# Patient Record
Sex: MALE | Race: WHITE | NOT HISPANIC OR LATINO | Employment: OTHER | URBAN - METROPOLITAN AREA
[De-identification: names, ages, dates, MRNs, and addresses within clinical notes are randomized per-mention and may not be internally consistent; named-entity substitution may affect disease eponyms.]

---

## 2017-01-23 ENCOUNTER — NON-PROVIDER VISIT (OUTPATIENT)
Dept: NEUROLOGY | Facility: MEDICAL CENTER | Age: 57
End: 2017-01-23
Payer: COMMERCIAL

## 2017-01-23 DIAGNOSIS — G40.209 PARTIAL EPILEPSY WITH IMPAIRMENT OF CONSCIOUSNESS (HCC): ICD-10-CM

## 2017-01-23 PROCEDURE — 95951 PR EEG MONITORING/VIDEORECORD: CPT | Mod: 52 | Performed by: PSYCHIATRY & NEUROLOGY

## 2017-01-23 NOTE — MR AVS SNAPSHOT
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   [facility] [place] (--)    [address]  [address]  [place] [address] [phone]           You will be receiving a confirmation call a few days before your appointment from our automated call confirmation system.              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 Review all of your home medications and newly ordered medications with your provider and/or pharmacist. Follow medication  instructions as directed by your provider and/or pharmacist. Please keep your medication list with you and share with your provider. Update the information when medications are discontinued, doses are changed, or new medications (including over-the-counter products) are added; and carry medication information at all times in the event of emergency situations     Allergies:  No Known Allergies          Medications  Valid as of: January 23, 2017 -  8:54 AM    Generic Name Brand Name Tablet Size Instructions for use    Albuterol Sulfate (Aero Soln) albuterol 108 (90 BASE) MCG/ACT INHALE 2 PUFFS BY MOUTH EVERY 6 HOURS ASNEEDED FOR SHORTNESS OF BREATH        Beclomethasone Dipropionate (Aero Soln) QVAR 40 MCG/ACT Inhale 1 Puff by mouth 2 Times a Day.        CarBAMazepine (CAPSULE SR 12 HR) CARBATROL 300 MG Take 1 Cap by mouth 2 times a day.        LamoTRIgine (Tab) LAMICTAL 100 MG Take 1 Tab by mouth 2 times a day. Take with 200 mg to equal 300 mg        LamoTRIgine (Tab) LAMICTAL 200 MG TAKE 1 TABLET BY MOUTH TWICE DAILY-GENERIC FOR LAMICTAL        Pravastatin Sodium (Tab) PRAVACHOL 20 MG TAKE 1 TABLET BY MOUTH EVERY EVENING-GENERIC FOR PRAVACHOL        .                 Medicines prescribed today were sent to:     [id number] - [place], [place] - [address]    [address] [facility] [place] [address]    Phone: [phone] Fax: [phone]    Open 24 Hours?: No      Medication refill instructions:       If your prescription bottle indicates you have medication refills left, it is not necessary to call your provider’s office. Please contact your pharmacy and they will refill your medication.    If your prescription bottle indicates you do not have any refills left, you may request refills at any time through one of the following ways: The online [employer] system (except Urgent Care), by calling your provider’s office, or by asking your pharmacy to contact your provider’s office with a refill request.  Medication refills are processed only during regular business hours and may not be available until the next business day. Your provider may request additional information or to have a follow-up visit with you prior to refilling your medication.   *Please Note: Medication refills are assigned a new Rx number when refilled electronically. Your pharmacy may indicate that no refills were authorized even though a new prescription for the same medication is available at the pharmacy. Please request the medicine by name with the pharmacy before contacting your provider for a refill.           InMyShow Access Code: Activation code not generated  Current InMyShow Status: Active

## 2017-01-25 NOTE — PROCEDURES
DATE OF SERVICE:  01/23/2017    ROUTINE VIDEO ELECTROENCEPHALOGRAM REPORT      Referring MD: Dr. Brent Rutledge.     DOS:  1/23/2017    INDICATION:  Fredy Zavala 56 y.o. male presenting with history of focal seizures and recurrent spells of dizziness and possibly changes in awareness.     CURRENT ANTIEPILEPTIC REGIMEN: Lamotrigine and carbamazepine     TECHNIQUE: 30 channel routine electroencephalogram (EEG) was performed in accordance with the international 10-20 system. The study was reviewed in bipolar and referential montages. The recording examined the patient during wakeful and drowsy state(s).     DESCRIPTION OF THE RECORD:  During the wakefulness, the background showed a symmetrical 8-9 Hz alpha activity posteriorly with amplitude of 70 mV.  There was reactivity to eye closure/opening.  A normal anterior-posterior gradient was noted with faster beta frequencies seen anteriorly.  During drowsiness, theta frequencies were seen.    ACTIVATION PROCEDURES:    Hyperventilation was performed by the patient for a total of 3 minutes. The technician performing the test noted good effort. This technique produced electroencephalographic changes in keeping with the expected bilaterally synchronous, frontally predominant, high amplitude slow waves build up.     Intermittent Photic stimulation was performed in a stepwise fashion from 1 to 30 Hz. There was not photic driving at higher frequencies.     ICTAL AND/OR INTERICTAL FINDINGS:    No focal or generalized epileptiform activity noted. No regional slowing was seen during this routine study.  No clinical events or seizures were reported or recorded during the study.      EKG: sampling of the EKG recording demonstrated sinus rhythm.      INTERPRETATION:  This is a normal video EEG recording in the awake and drowsy state(s).  Clinical correlation is recommended.    Note: A normal EEG does not rule out epilepsy.  If the clinical suspicion remains high for seizures,  a prolonged recording to capture clinical or subclinical events may be helpful.      Cesario Negron MD  Diplomate in Neurology, Epilepsy, and Electrodiagnostic Medicine.    RANDI CUELLAR    DD:  01/24/2017 18:42:54  DT:  01/24/2017 20:07:00    D#:  [id number]  Job#:  [id number]    cc: RUIZ SMITH MD

## 2017-01-25 NOTE — PROGRESS NOTES
ROUTINE VIDEO ELECTROENCEPHALOGRAM REPORT      Referring MD: Dr. Brent Rutledge.     DOS:  1/23/2017    INDICATION:  Fredy Zavala 56 y.o. male presenting with history of focal seizures and recurrent spells of dizziness and possibly changes in awareness.     CURRENT ANTIEPILEPTIC REGIMEN: Lamotrigine and carbamazepine     TECHNIQUE: 30 channel routine electroencephalogram (EEG) was performed in accordance with the international 10-20 system. The study was reviewed in bipolar and referential montages. The recording examined the patient during wakeful and drowsy state(s).     DESCRIPTION OF THE RECORD:  During the wakefulness, the background showed a symmetrical 8-9 Hz alpha activity posteriorly with amplitude of 70 mV.  There was reactivity to eye closure/opening.  A normal anterior-posterior gradient was noted with faster beta frequencies seen anteriorly.  During drowsiness, theta frequencies were seen.    ACTIVATION PROCEDURES:   Hyperventilation was performed by the patient for a total of 3 minutes. The technician performing the test noted good effort. This technique produced electroencephalographic changes in keeping with the expected bilaterally synchronous, frontally predominant, high amplitude slow waves build up.     Intermittent Photic stimulation was performed in a stepwise fashion from 1 to 30 Hz. There was not photic driving at higher frequencies.     ICTAL AND/OR INTERICTAL FINDINGS:   No focal or generalized epileptiform activity noted. No regional slowing was seen during this routine study.  No clinical events or seizures were reported or recorded during the study.     EKG: sampling of the EKG recording demonstrated sinus rhythm.      INTERPRETATION:  This is a normal video EEG recording in the awake and drowsy state(s).  Clinical correlation is recommended.    Note: A normal EEG does not rule out epilepsy.  If the clinical suspicion remains high for seizures, a prolonged recording to capture  clinical or subclinical events may be helpful.      Cesario Negron MD  Diplomate in Neurology, Epilepsy, and Electrodiagnostic Medicine.

## 2017-02-06 ENCOUNTER — PATIENT MESSAGE (OUTPATIENT)
Dept: NEUROLOGY | Facility: MEDICAL CENTER | Age: 57
End: 2017-02-06

## 2017-02-06 ENCOUNTER — TELEPHONE (OUTPATIENT)
Dept: NEUROLOGY | Facility: MEDICAL CENTER | Age: 57
End: 2017-02-06

## 2017-02-06 NOTE — TELEPHONE ENCOUNTER
"From: Fredy Zavala  To: Brent Rutledge M.D.  Sent: 2/6/2017 2:19 PM PST  Subject: Test Result Question    Good Afternoon Dr. Galvan,      I contacted your office today and spoke with the medical assistant. I was calling regarding my EEG results as the information in the my chart only says the results are \"no component information available for this result\". The medical assistant indicated that my EEG results are slightly below normal but had no other information for me. What is the next step for me is? What do these results mean? The MA questioned if I had seen the doctor that read results?  "

## 2017-02-06 NOTE — TELEPHONE ENCOUNTER
Pt is calling and asking for the results of his EEG. Results given. Pt is going to make a follow up appointment with Dr. Rutledge. KA

## 2017-05-03 ENCOUNTER — TELEPHONE (OUTPATIENT)
Dept: MEDICAL GROUP | Facility: PHYSICIAN GROUP | Age: 57
End: 2017-05-03

## 2017-05-03 NOTE — TELEPHONE ENCOUNTER
ESTABLISHED PATIENT PRE-VISIT PLANNING     Note: Patient will not be contacted if there is no indication to call.     1.  Reviewed note from last office visit with PCP and/or other med group provider: Yes    2.  If any orders were placed at last visit, do we have Results/Consult Notes?        •  Labs - Labs ordered, completed and results are in chart.       •  Imaging - Imaging was not ordered at last office visit.       •  Referrals - No referrals were ordered at last office visit.    3.  Immunizations were updated in Epic using WebIZ?: Epic matches WebIZ       •  Web Iz Recommendations: HEPATITIS A  HEPATITIS B MMR  TD VARICELLA (Chicken Pox)     4.  Patient is due for the following Health Maintenance Topics:   Health Maintenance Due   Topic Date Due   • IMM PNEUMOCOCCAL 19-64 (ADULT) MEDIUM RISK SERIES (1 of 1 - PPSV23) 05/24/1978   • PFT SCREENING-FEV1 AND FEV/FVC RATIO / SPIROMETRY SHOULD BE PERFORMED ANNUALLY  02/06/2010       - Patient has completed FLU and TDAP Immunization(s) per WebIZ. Chart has been updated.    5.  Patient was not informed to arrive 15 min prior to their scheduled appointment and bring in their medication bottles.

## 2017-05-04 ENCOUNTER — APPOINTMENT (OUTPATIENT)
Dept: MEDICAL GROUP | Facility: PHYSICIAN GROUP | Age: 57
End: 2017-05-04
Payer: COMMERCIAL

## 2017-05-10 ENCOUNTER — PATIENT OUTREACH (OUTPATIENT)
Dept: HEALTH INFORMATION MANAGEMENT | Facility: OTHER | Age: 57
End: 2017-05-10

## 2017-05-10 NOTE — PROGRESS NOTES
Outcome: Requested Call Back    WebIZ Checked & Epic Updated:  no    [facility]Connect Verified: yes    Attempt # 1

## 2017-05-19 ENCOUNTER — PATIENT MESSAGE (OUTPATIENT)
Dept: HEALTH INFORMATION MANAGEMENT | Facility: OTHER | Age: 57
End: 2017-05-19

## 2017-05-19 NOTE — PROGRESS NOTES
Attempt #:2    WebIZ Checked & Epic Updated: yes  HealthConnect Verified: yes  Verify PCP: yes    Communication Preference Obtained: yes     Review Care Team: yes    Annual Wellness Visit Scheduling - Patient will call back when ready to schedule  1. Scheduling Status:Not Scheduled. Patient states they are not interested        Care Gap Scheduling (Attempt to Schedule EACH Overdue Care Gap!)- Unable to discuss     Health Maintenance Due   Topic Date Due   • IMM PNEUMOCOCCAL 19-64 (ADULT) MEDIUM RISK SERIES (1 of 1 - PPSV23) 05/24/1978   • PFT SCREENING-FEV1 AND FEV/FVC RATIO / SPIROMETRY SHOULD BE PERFORMED ANNUALLY  02/06/2010         Audiolife Activation: already active  Audiolife Julissa: no  Virtual Visits: no  Opt In to Text Messages: no

## 2017-09-11 DIAGNOSIS — E78.5 DYSLIPIDEMIA: ICD-10-CM

## 2017-09-11 DIAGNOSIS — R56.9 SEIZURE (HCC): ICD-10-CM

## 2017-09-11 RX ORDER — LAMOTRIGINE 100 MG/1
100 TABLET ORAL 2 TIMES DAILY
Qty: 180 TAB | Refills: 0 | Status: SHIPPED | OUTPATIENT
Start: 2017-09-11 | End: 2017-12-15 | Stop reason: SDUPTHER

## 2017-09-11 RX ORDER — PRAVASTATIN SODIUM 20 MG
TABLET ORAL
Qty: 90 TAB | Refills: 0 | Status: SHIPPED | OUTPATIENT
Start: 2017-09-11 | End: 2017-12-11 | Stop reason: SDUPTHER

## 2017-12-11 DIAGNOSIS — E78.5 DYSLIPIDEMIA: ICD-10-CM

## 2017-12-11 DIAGNOSIS — R56.9 SEIZURE (HCC): ICD-10-CM

## 2017-12-11 RX ORDER — LAMOTRIGINE 200 MG/1
TABLET ORAL
Qty: 60 TAB | Refills: 0 | Status: SHIPPED | OUTPATIENT
Start: 2017-12-11 | End: 2017-12-15 | Stop reason: SDUPTHER

## 2017-12-11 RX ORDER — PRAVASTATIN SODIUM 20 MG
TABLET ORAL
Qty: 30 TAB | Refills: 0 | Status: SHIPPED | OUTPATIENT
Start: 2017-12-11 | End: 2017-12-15 | Stop reason: SDUPTHER

## 2017-12-11 RX ORDER — CARBAMAZEPINE 300 MG/1
300 CAPSULE, EXTENDED RELEASE ORAL 2 TIMES DAILY
Qty: 60 CAP | Refills: 0 | Status: SHIPPED | OUTPATIENT
Start: 2017-12-11 | End: 2017-12-15 | Stop reason: SDUPTHER

## 2017-12-12 NOTE — TELEPHONE ENCOUNTER
Patient needs appointment for more refills. Only 30 day supply given. Has not been seen in over a year.

## 2017-12-15 DIAGNOSIS — E78.5 DYSLIPIDEMIA: ICD-10-CM

## 2017-12-15 DIAGNOSIS — R56.9 SEIZURE (HCC): ICD-10-CM

## 2017-12-15 NOTE — TELEPHONE ENCOUNTER
From: Fredy Zavala  Sent: 12/15/2017 12:02 PM PST  Subject: Medication Renewal Request    Fredy Zavala would like a refill of the following medications:     lamotrigine (LAMICTAL) 100 MG Tab [Beatrice Smith M.D.]     lamotrigine (LAMICTAL) 200 MG tablet [Beatrice Smith M.D.]     pravastatin (PRAVACHOL) 20 MG Tab [Beatrice Smith M.D.]     carbamazepine (CARBATROL) 300 MG CR capsule [Beatrice Smith M.D.]    Preferred pharmacy: [id number] - [place], [place] - [address] [facility].    

## 2017-12-17 RX ORDER — LAMOTRIGINE 100 MG/1
100 TABLET ORAL 2 TIMES DAILY
Qty: 60 TAB | Refills: 0 | Status: SHIPPED | OUTPATIENT
Start: 2017-12-17 | End: 2017-12-21 | Stop reason: SDUPTHER

## 2017-12-17 RX ORDER — PRAVASTATIN SODIUM 20 MG
TABLET ORAL
Qty: 30 TAB | Refills: 0 | Status: SHIPPED | OUTPATIENT
Start: 2017-12-17 | End: 2018-02-12 | Stop reason: SDUPTHER

## 2017-12-17 RX ORDER — LAMOTRIGINE 200 MG/1
TABLET ORAL
Qty: 60 TAB | Refills: 0 | Status: SHIPPED | OUTPATIENT
Start: 2017-12-17 | End: 2017-12-21 | Stop reason: SDUPTHER

## 2017-12-17 RX ORDER — CARBAMAZEPINE 300 MG/1
300 CAPSULE, EXTENDED RELEASE ORAL 2 TIMES DAILY
Qty: 60 CAP | Refills: 0 | Status: SHIPPED | OUTPATIENT
Start: 2017-12-17 | End: 2017-12-21 | Stop reason: SDUPTHER

## 2017-12-21 ENCOUNTER — OFFICE VISIT (OUTPATIENT)
Dept: NEUROLOGY | Facility: MEDICAL CENTER | Age: 57
End: 2017-12-21
Payer: COMMERCIAL

## 2017-12-21 VITALS
RESPIRATION RATE: 15 BRPM | TEMPERATURE: 97.5 F | HEART RATE: 76 BPM | WEIGHT: 188.6 LBS | BODY MASS INDEX: 28.58 KG/M2 | HEIGHT: 68 IN | SYSTOLIC BLOOD PRESSURE: 140 MMHG | DIASTOLIC BLOOD PRESSURE: 80 MMHG | OXYGEN SATURATION: 96 %

## 2017-12-21 DIAGNOSIS — G40.209 PARTIAL EPILEPSY WITH IMPAIRMENT OF CONSCIOUSNESS (HCC): Primary | ICD-10-CM

## 2017-12-21 PROCEDURE — 99214 OFFICE O/P EST MOD 30 MIN: CPT | Performed by: PSYCHIATRY & NEUROLOGY

## 2017-12-21 RX ORDER — LAMOTRIGINE 200 MG/1
TABLET ORAL
Qty: 60 TAB | Refills: 11 | Status: SHIPPED | OUTPATIENT
Start: 2017-12-21 | End: 2019-01-31 | Stop reason: SDUPTHER

## 2017-12-21 RX ORDER — CARBAMAZEPINE 300 MG/1
300 CAPSULE, EXTENDED RELEASE ORAL 2 TIMES DAILY
Qty: 60 CAP | Refills: 11 | Status: SHIPPED | OUTPATIENT
Start: 2017-12-21 | End: 2017-12-21 | Stop reason: SDUPTHER

## 2017-12-21 RX ORDER — LAMOTRIGINE 200 MG/1
TABLET ORAL
Qty: 60 TAB | Refills: 11 | Status: SHIPPED | OUTPATIENT
Start: 2017-12-21 | End: 2017-12-21 | Stop reason: SDUPTHER

## 2017-12-21 RX ORDER — LAMOTRIGINE 100 MG/1
100 TABLET ORAL 2 TIMES DAILY
Qty: 60 TAB | Refills: 11 | Status: SHIPPED | OUTPATIENT
Start: 2017-12-21 | End: 2017-12-21 | Stop reason: SDUPTHER

## 2017-12-21 RX ORDER — CARBAMAZEPINE 300 MG/1
300 CAPSULE, EXTENDED RELEASE ORAL 2 TIMES DAILY
Qty: 60 CAP | Refills: 11 | Status: SHIPPED | OUTPATIENT
Start: 2017-12-21 | End: 2018-02-12 | Stop reason: SDUPTHER

## 2017-12-21 RX ORDER — LAMOTRIGINE 100 MG/1
100 TABLET ORAL 2 TIMES DAILY
Qty: 60 TAB | Refills: 11 | Status: SHIPPED | OUTPATIENT
Start: 2017-12-21 | End: 2019-01-31 | Stop reason: SDUPTHER

## 2017-12-21 ASSESSMENT — ENCOUNTER SYMPTOMS
MEMORY LOSS: 0
SEIZURES: 0
DEPRESSION: 0
LOSS OF CONSCIOUSNESS: 0

## 2017-12-21 ASSESSMENT — PAIN SCALES - GENERAL: PAINLEVEL: NO PAIN

## 2017-12-21 ASSESSMENT — PATIENT HEALTH QUESTIONNAIRE - PHQ9: CLINICAL INTERPRETATION OF PHQ2 SCORE: 0

## 2018-01-04 ENCOUNTER — OFFICE VISIT (OUTPATIENT)
Dept: MEDICAL GROUP | Facility: PHYSICIAN GROUP | Age: 58
End: 2018-01-04
Payer: COMMERCIAL

## 2018-01-04 VITALS
HEART RATE: 71 BPM | SYSTOLIC BLOOD PRESSURE: 140 MMHG | BODY MASS INDEX: 28.49 KG/M2 | OXYGEN SATURATION: 97 % | HEIGHT: 68 IN | WEIGHT: 188 LBS | DIASTOLIC BLOOD PRESSURE: 80 MMHG | RESPIRATION RATE: 16 BRPM | TEMPERATURE: 97.5 F

## 2018-01-04 DIAGNOSIS — Z85.46 HISTORY OF PROSTATE CANCER: ICD-10-CM

## 2018-01-04 DIAGNOSIS — G40.209 PARTIAL EPILEPSY WITH IMPAIRMENT OF CONSCIOUSNESS (HCC): ICD-10-CM

## 2018-01-04 DIAGNOSIS — L29.9 ITCHY SKIN: ICD-10-CM

## 2018-01-04 DIAGNOSIS — E78.5 DYSLIPIDEMIA: ICD-10-CM

## 2018-01-04 PROCEDURE — 99214 OFFICE O/P EST MOD 30 MIN: CPT | Performed by: FAMILY MEDICINE

## 2018-01-04 NOTE — ASSESSMENT & PLAN NOTE
Ongoing issue. Patient was diagnosed with prostate cancer 5 years ago. Currently follows with urology for this issue. He did have a prostatectomy for this problem; he is currently denying any new issues to be addressed today

## 2018-01-04 NOTE — ASSESSMENT & PLAN NOTE
New problem. Patient reports that he started having some itchy scan approximately 3 weeks ago. He has had it in the areas of his back, hands, lower legs. He states it is itchy in nature; does not have any pain; there are no pustules associated with this or redness. He has tried some lotion but does not recall what the name of it is.

## 2018-01-04 NOTE — PROGRESS NOTES
Subjective:   Fredy Zavala is a 57 y.o. male here today for Seizures, itchy skin, history of prostate cancer    Partial epilepsy with impairment of consciousness  Ongoing issue. Patient reports compliance with current medication and follows up with neurology for this issue. He reports that he continues to have seizures on a regular basis. He currently does not drive.    Itchy skin  New problem. Patient reports that he started having some itchy scan approximately 3 weeks ago. He has had it in the areas of his back, hands, lower legs. He states it is itchy in nature; does not have any pain; there are no pustules associated with this or redness. He has tried some lotion but does not recall what the name of it is.    History of prostate cancer  Ongoing issue. Patient was diagnosed with prostate cancer 5 years ago. Currently follows with urology for this issue. He did have a prostatectomy for this problem; he is currently denying any new issues to be addressed today    Dyslipidemia  Ongoing issue. Patient reports compliance with pravastatin 20 mg daily; he denies any new issues with muscle cramps or weakness. Chart review shows that his previous total cholesterol and LDL cholesterol were mildly elevated. He reports that he does try to eat healthy but does not get any regular exercise.     Patient is here today to establish care    Current medicines (including changes today)  Current Outpatient Prescriptions   Medication Sig Dispense Refill   • lamotrigine (LAMICTAL) 200 MG tablet TAKE 1 TABLET BY MOUTH TWICE DAILY-GENERIC FOR LAMICTAL 60 Tab 11   • lamotrigine (LAMICTAL) 100 MG Tab Take 1 Tab by mouth 2 times a day. Take with 200 mg to equal 300 mg 60 Tab 11   • carbamazepine (CARBATROL) 300 MG CR capsule Take 1 Cap by mouth 2 times a day. 60 Cap 11   • pravastatin (PRAVACHOL) 20 MG Tab TAKE 1 TABLET BY MOUTH EVERY EVENING-GENERIC FOR PRAVACHOL 30 Tab 0   • albuterol (PROAIR HFA) 108 (90 BASE) MCG/ACT Aero Soln  "inhalation aerosol INHALE 2 PUFFS BY MOUTH EVERY 6 HOURS ASNEEDED FOR SHORTNESS OF BREATH 8.5 g 5   • beclomethasone (QVAR) 40 MCG/ACT inhaler Inhale 1 Puff by mouth 2 Times a Day. 3 Inhaler 3     No current facility-administered medications for this visit.      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    ROS   No chest pain, no shortness of breath, no abdominal pain  +Itchy skin     Objective:     Blood pressure 140/80, pulse 71, temperature 36.4 °C (97.5 °F), resp. rate 16, height 1.727 m (5' 8\"), weight 85.3 kg (188 lb), SpO2 97 %. Body mass index is 28.59 kg/m².   Physical Exam:  Alert, oriented in no acute distress.  Eye contact is good, speech goal directed, affect calm  HEENT: conjunctiva non-injected, sclera non-icteric.  Pinna normal. TM pearly gray.   Oral mucous membranes pink and moist with no lesions.  Neck No adenopathy or masses in the neck or supraclavicular regions.  Lungs: clear to auscultation bilaterally with good excursion.  CV: regular rate and rhythm.  Abdomen: soft, nontender, No CVAT  Ext: no edema, color normal, vascularity normal, temperature normal  Skin: Multiple patches of dry skin on the back, upper extremities and lower extremities. No pustules, mild erythema, no swelling. No tenderness to palpation, no blanching      Assessment and Plan:   The following treatment plan was discussed     1. Itchy skin      Uncontrolled; unknown origin. This appears to be dry skin; recommend over-the-counter cream to help with the management. Monitor   2. 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 Continue medications; continue follow with neurology and their recommendations   3. History of prostate cancer      Stable. Continue follow-up with urology   4. Dyslipidemia  COMP METABOLIC PANEL    LIPID PROFILE    " VITAMIN D,25 HYDROXY    Stable. Continue current medications; sent for labs and monitor for results       Followup: Return in about 1 year (around 1/4/2019) for medication review, Short.

## 2018-01-04 NOTE — ASSESSMENT & PLAN NOTE
Ongoing issue. Patient reports compliance with current medication and follows up with neurology for this issue. He reports that he continues to have seizures on a regular basis. He currently does not drive.

## 2018-01-04 NOTE — ASSESSMENT & PLAN NOTE
Ongoing issue. Patient reports compliance with pravastatin 20 mg daily; he denies any new issues with muscle cramps or weakness. Chart review shows that his previous total cholesterol and LDL cholesterol were mildly elevated. He reports that he does try to eat healthy but does not get any regular exercise.

## 2018-01-18 DIAGNOSIS — J44.9 CHRONIC OBSTRUCTIVE PULMONARY DISEASE, UNSPECIFIED COPD TYPE (HCC): ICD-10-CM

## 2018-01-18 RX ORDER — ALBUTEROL SULFATE 90 UG/1
AEROSOL, METERED RESPIRATORY (INHALATION)
Qty: 8.5 G | Refills: 5 | Status: SHIPPED | OUTPATIENT
Start: 2018-01-18 | End: 2021-09-09 | Stop reason: SDUPTHER

## 2018-02-12 DIAGNOSIS — G40.209 PARTIAL EPILEPSY WITH IMPAIRMENT OF CONSCIOUSNESS (HCC): ICD-10-CM

## 2018-02-12 DIAGNOSIS — E78.5 DYSLIPIDEMIA: ICD-10-CM

## 2018-02-13 RX ORDER — PRAVASTATIN SODIUM 20 MG
TABLET ORAL
Qty: 90 TAB | Refills: 3 | Status: SHIPPED | OUTPATIENT
Start: 2018-02-13 | End: 2019-01-31 | Stop reason: SDUPTHER

## 2018-02-13 RX ORDER — CARBAMAZEPINE 300 MG/1
300 CAPSULE, EXTENDED RELEASE ORAL 2 TIMES DAILY
Qty: 180 CAP | Refills: 3 | Status: SHIPPED | OUTPATIENT
Start: 2018-02-13 | End: 2019-02-05 | Stop reason: SDUPTHER

## 2018-03-26 ENCOUNTER — HOSPITAL ENCOUNTER (OUTPATIENT)
Dept: LAB | Facility: MEDICAL CENTER | Age: 58
End: 2018-03-26
Attending: UROLOGY
Payer: COMMERCIAL

## 2018-03-26 ENCOUNTER — HOSPITAL ENCOUNTER (OUTPATIENT)
Dept: LAB | Facility: MEDICAL CENTER | Age: 58
End: 2018-03-26
Attending: FAMILY MEDICINE
Payer: COMMERCIAL

## 2018-03-26 DIAGNOSIS — E78.5 DYSLIPIDEMIA: ICD-10-CM

## 2018-03-26 LAB
25(OH)D3 SERPL-MCNC: 15 NG/ML (ref 30–100)
ALBUMIN SERPL BCP-MCNC: 4.7 G/DL (ref 3.2–4.9)
ALBUMIN/GLOB SERPL: 1.7 G/DL
ALP SERPL-CCNC: 71 U/L (ref 30–99)
ALT SERPL-CCNC: 19 U/L (ref 2–50)
ANION GAP SERPL CALC-SCNC: 9 MMOL/L (ref 0–11.9)
AST SERPL-CCNC: 18 U/L (ref 12–45)
BILIRUB SERPL-MCNC: 0.7 MG/DL (ref 0.1–1.5)
BUN SERPL-MCNC: 14 MG/DL (ref 8–22)
CALCIUM SERPL-MCNC: 10.2 MG/DL (ref 8.5–10.5)
CHLORIDE SERPL-SCNC: 103 MMOL/L (ref 96–112)
CHOLEST SERPL-MCNC: 273 MG/DL (ref 100–199)
CO2 SERPL-SCNC: 27 MMOL/L (ref 20–33)
CREAT SERPL-MCNC: 0.97 MG/DL (ref 0.5–1.4)
GLOBULIN SER CALC-MCNC: 2.7 G/DL (ref 1.9–3.5)
GLUCOSE SERPL-MCNC: 80 MG/DL (ref 65–99)
HDLC SERPL-MCNC: 44 MG/DL
LDLC SERPL CALC-MCNC: 211 MG/DL
POTASSIUM SERPL-SCNC: 4.1 MMOL/L (ref 3.6–5.5)
PROT SERPL-MCNC: 7.4 G/DL (ref 6–8.2)
PSA SERPL-MCNC: 0.01 NG/ML (ref 0–4)
SODIUM SERPL-SCNC: 139 MMOL/L (ref 135–145)
TRIGL SERPL-MCNC: 90 MG/DL (ref 0–149)

## 2018-03-26 PROCEDURE — 36415 COLL VENOUS BLD VENIPUNCTURE: CPT

## 2018-03-26 PROCEDURE — 82306 VITAMIN D 25 HYDROXY: CPT

## 2018-03-26 PROCEDURE — 84153 ASSAY OF PSA TOTAL: CPT

## 2018-03-26 PROCEDURE — 80061 LIPID PANEL: CPT

## 2018-03-26 PROCEDURE — 80053 COMPREHEN METABOLIC PANEL: CPT

## 2019-01-31 ENCOUNTER — OFFICE VISIT (OUTPATIENT)
Dept: URGENT CARE | Facility: PHYSICIAN GROUP | Age: 59
End: 2019-01-31
Payer: COMMERCIAL

## 2019-01-31 VITALS
RESPIRATION RATE: 16 BRPM | SYSTOLIC BLOOD PRESSURE: 112 MMHG | TEMPERATURE: 98.1 F | OXYGEN SATURATION: 98 % | DIASTOLIC BLOOD PRESSURE: 62 MMHG | HEART RATE: 82 BPM | HEIGHT: 68 IN | WEIGHT: 188 LBS | BODY MASS INDEX: 28.49 KG/M2

## 2019-01-31 DIAGNOSIS — E78.5 DYSLIPIDEMIA: ICD-10-CM

## 2019-01-31 DIAGNOSIS — Z76.0 ENCOUNTER FOR MEDICATION REFILL: ICD-10-CM

## 2019-01-31 DIAGNOSIS — G40.209 PARTIAL EPILEPSY WITH IMPAIRMENT OF CONSCIOUSNESS (HCC): ICD-10-CM

## 2019-01-31 PROCEDURE — 99213 OFFICE O/P EST LOW 20 MIN: CPT | Performed by: NURSE PRACTITIONER

## 2019-01-31 RX ORDER — PRAVASTATIN SODIUM 20 MG
TABLET ORAL
Qty: 30 TAB | Refills: 0 | Status: SHIPPED | OUTPATIENT
Start: 2019-01-31 | End: 2020-06-16 | Stop reason: SDUPTHER

## 2019-01-31 RX ORDER — LAMOTRIGINE 100 MG/1
100 TABLET ORAL 2 TIMES DAILY
Qty: 60 TAB | Refills: 0 | Status: SHIPPED | OUTPATIENT
Start: 2019-01-31 | End: 2019-02-05 | Stop reason: SDUPTHER

## 2019-01-31 RX ORDER — LAMOTRIGINE 200 MG/1
TABLET ORAL
Qty: 60 TAB | Refills: 0 | Status: SHIPPED | OUTPATIENT
Start: 2019-01-31 | End: 2019-02-05 | Stop reason: SDUPTHER

## 2019-01-31 ASSESSMENT — ENCOUNTER SYMPTOMS: SEIZURES: 1

## 2019-02-01 NOTE — PROGRESS NOTES
"Subjective:      Fredy Zavala is a 58 y.o. male who presents with Seizure (med refill Lamictal// prarvastatin )            This is a new problem. Pt reports this evening he is in need of medication refills for his epilepsy and cholesterol medication. He states his old PCP moved away and his new PCP appt is in one month. He tried contacting the office for refills but was unsuccessful. He has been well controlled with both issues for several years and there have not been any new seizures recently. Denies any other needs at this time.        Review of Systems   Neurological: Positive for seizures.        Hx epilepsy   All other systems reviewed and are negative.    Past Medical History:   Diagnosis Date   • ASTHMA    • Cancer (HCC) 5/12    prostate   • COPD (chronic obstructive pulmonary disease) (HCC)    • Dyslipidemia    • Fall     hasn't fallen but has seizure disorder   • Partial epilepsy with impairment of consciousness (HCC)      ICD-10 transition      Past Surgical History:   Procedure Laterality Date   • PROSTATECTOMY ROBOTIC  5/29/2012    Performed by JACOB RAMOS at SURGERY [facility]   • COLONOSCOPY  8/10    grade 1 int. hemorrhoids      Social History     Social History   • Marital status:      Spouse name: N/A   • Number of children: N/A   • Years of education: N/A     Occupational History   • Not on file.     Social History Main Topics   • Smoking status: Never Smoker   • Smokeless tobacco: Never Used   • Alcohol use 0.0 oz/week      Comment: 5 drinks/weekend   • Drug use: No      Comment: previous   • Sexual activity: Yes     Partners: Female     Other Topics Concern   • Not on file     Social History Narrative   • No narrative on file          Objective:     /62   Pulse 82   Temp 36.7 °C (98.1 °F) (Temporal)   Resp 16   Ht 1.727 m (5' 8\")   Wt 85.3 kg (188 lb)   SpO2 98%   BMI 28.59 kg/m²      Physical Exam   Constitutional: He is oriented to person, place, and time. " Vital signs are normal. He appears well-developed and well-nourished.   HENT:   Head: Normocephalic and atraumatic.   Eyes: Pupils are equal, round, and reactive to light. EOM are normal.   Neck: Normal range of motion.   Cardiovascular: Normal rate and regular rhythm.    Pulmonary/Chest: Effort normal.   Musculoskeletal: Normal range of motion.   Neurological: He is alert and oriented to person, place, and time. He has normal strength. No cranial nerve deficit or sensory deficit.   Skin: Skin is warm and dry. Capillary refill takes less than 2 seconds.   Psychiatric: He has a normal mood and affect. His speech is normal and behavior is normal. Thought content normal.   Vitals reviewed.              Assessment/Plan:     1. Encounter for medication refill    2. Partial epilepsy with impairment of consciousness (HCC)  - lamoTRIgine (LAMICTAL) 100 MG Tab; Take 1 Tab by mouth 2 times a day. Take with 200 mg to equal 300 mg  Dispense: 60 Tab; Refill: 0  - lamotrigine (LAMICTAL) 200 MG tablet; TAKE 1 TABLET BY MOUTH TWICE DAILY-GENERIC FOR LAMICTAL  Dispense: 60 Tab; Refill: 0    3. Dyslipidemia  - pravastatin (PRAVACHOL) 20 MG Tab; TAKE 1 TABLET BY MOUTH EVERY EVENING-GENERIC FOR PRAVACHOL  Dispense: 30 Tab; Refill: 0    Happy to refill medications he is in need of  Follow up with PCP as previously scheduled  Pt declines any further needs this evening  Instructed to return to  or nearest emergency department if symptoms fail to improve, for any change in condition, further concerns, or new concerning symptoms.  Patient states understanding of the plan of care and discharge instructions.

## 2019-02-04 ENCOUNTER — APPOINTMENT (OUTPATIENT)
Dept: NEUROLOGY | Facility: MEDICAL CENTER | Age: 59
End: 2019-02-04
Payer: COMMERCIAL

## 2019-02-05 ENCOUNTER — OFFICE VISIT (OUTPATIENT)
Dept: NEUROLOGY | Facility: MEDICAL CENTER | Age: 59
End: 2019-02-05
Payer: COMMERCIAL

## 2019-02-05 VITALS
BODY MASS INDEX: 24.43 KG/M2 | TEMPERATURE: 97.8 F | OXYGEN SATURATION: 96 % | SYSTOLIC BLOOD PRESSURE: 152 MMHG | HEIGHT: 68 IN | RESPIRATION RATE: 16 BRPM | HEART RATE: 86 BPM | WEIGHT: 161.2 LBS | DIASTOLIC BLOOD PRESSURE: 78 MMHG

## 2019-02-05 DIAGNOSIS — G40.209 PARTIAL EPILEPSY WITH IMPAIRMENT OF CONSCIOUSNESS (HCC): Primary | ICD-10-CM

## 2019-02-05 PROCEDURE — 99213 OFFICE O/P EST LOW 20 MIN: CPT | Performed by: PSYCHIATRY & NEUROLOGY

## 2019-02-05 RX ORDER — LAMOTRIGINE 200 MG/1
TABLET ORAL
Qty: 180 TAB | Refills: 3 | Status: SHIPPED | OUTPATIENT
Start: 2019-02-05 | End: 2020-01-02 | Stop reason: SDUPTHER

## 2019-02-05 RX ORDER — CARBAMAZEPINE 300 MG/1
300 CAPSULE, EXTENDED RELEASE ORAL 2 TIMES DAILY
Qty: 180 CAP | Refills: 3 | Status: SHIPPED | OUTPATIENT
Start: 2019-02-05 | End: 2020-01-02 | Stop reason: SDUPTHER

## 2019-02-05 RX ORDER — LAMOTRIGINE 100 MG/1
100 TABLET ORAL 2 TIMES DAILY
Qty: 180 TAB | Refills: 3 | Status: SHIPPED | OUTPATIENT
Start: 2019-02-05 | End: 2020-01-02 | Stop reason: SDUPTHER

## 2019-02-05 ASSESSMENT — PAIN SCALES - GENERAL: PAINLEVEL: NO PAIN

## 2019-02-05 ASSESSMENT — ENCOUNTER SYMPTOMS
TREMORS: 1
MEMORY LOSS: 0
SEIZURES: 1

## 2019-02-05 ASSESSMENT — PATIENT HEALTH QUESTIONNAIRE - PHQ9: CLINICAL INTERPRETATION OF PHQ2 SCORE: 0

## 2019-02-05 NOTE — PROGRESS NOTES
"Subjective:      Fredy Zavala is a 58 y.o. male who presents with his wife Jovita, for follow-up, with a history of persistent complex partial seizures.    HPI    Since last seen a little over one year ago, his seizures have remained a problem.  Though he has not had a cluster of attacks in a little while, he is still having them on a near daily basis.  For him there is no warning, Jovita simply recognizes that he is grunting, followed by picking movements with the hands, and then a deep breath when things are over.  They are brief, though it takes him another 15 minutes or so to come back to baseline.  Recovery is always complete.  There has never been a diurnal pattern to occurrence.  He is compliant with medication, rarely missing a single dose of his Carbatrol and Lamictal.    He still has the tremulousness on his medications.  There are no GI side effects.  Appetite and weight are stable.  Blood work including CMP drawn last March, 2018 was unremarkable, this included lipid profile.  Unfortunately, it did not include CBC, Lamictal or Tegretol blood levels.  Though I had ordered a video EEG to be done, he never followed through with this.    Medical, surgical and family histories are reviewed in the electronic health record, there are no new drug allergies.  He is on Carbatrol 300 mg, twice daily, Lamictal 300 mg, twice daily, Pravachol, and his inhalers.    Review of Systems   Constitutional: Negative for malaise/fatigue.   Skin: Negative for rash.   Neurological: Positive for tremors and seizures.   Psychiatric/Behavioral: Negative for memory loss.   All other systems reviewed and are negative.       Objective:     /78 (BP Location: Right arm, Patient Position: Sitting)   Pulse 86   Temp 36.6 °C (97.8 °F)   Resp 16   Ht 1.727 m (5' 8\")   Wt 73.1 kg (161 lb 3.2 oz)   SpO2 96%   BMI 24.51 kg/m²      Physical Exam    He appears in no acute distress.  His vital signs are stable.  There is no malar " rash.  The neck is supple, range of motion is full.  Cardiac evaluation is unremarkable.    Including mental status, cranial nerves, musculoskeletal, reflex, coordination commonsense evaluations, the full neurologic examination is done and reveals no evidence of any deficits.  The tremulousness with both hands is unchanged.     Assessment/Plan:     1. 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    The real issue is that his simple disinterest in doing any workup to better characterize the events and hopefully change his regimen, he has side effects with both of these medicines.  Right now he is opting out of anything else, we will call the office if he decides that getting an EEG study is reasonable.  I am pushing for EMU admission for several days to better characterize the events while he is off medication in a controlled place.  In the interim, Tegretol and Lamictal levels will be checked, we will call him with results if they are abnormal or warrant any types of changes.  He will call the office if he does change his mind and is willing to pursue any EEG.  Follow-up in 1 year is scheduled.    - carbamazepine (CARBATROL) 300 MG CR capsule; Take 1 Cap by mouth 2 times a day.  Dispense: 180 Cap; Refill: 3  - lamoTRIgine (LAMICTAL) 100 MG Tab; Take 1 Tab by mouth 2 times a day. Take with 200 mg to equal 300 mg  Dispense: 180 Tab; Refill: 3  - lamotrigine (LAMICTAL) 200 MG tablet; TAKE 1 TABLET BY MOUTH TWICE DAILY-GENERIC FOR LAMICTAL  Dispense: 180 Tab; Refill: 3  - CARBAMAZEPINE; Future  - LAMOTRIGINE; Future    Time: 20 minutes spent face-to-face for exam, review, discussion, and education, of this over 50% of the time spent  counseling and coordinating care surrounding all of the above issues.

## 2019-02-28 ENCOUNTER — OFFICE VISIT (OUTPATIENT)
Dept: MEDICAL GROUP | Facility: PHYSICIAN GROUP | Age: 59
End: 2019-02-28
Payer: COMMERCIAL

## 2019-02-28 VITALS
HEIGHT: 68 IN | BODY MASS INDEX: 24.43 KG/M2 | TEMPERATURE: 98 F | SYSTOLIC BLOOD PRESSURE: 124 MMHG | OXYGEN SATURATION: 100 % | DIASTOLIC BLOOD PRESSURE: 70 MMHG | RESPIRATION RATE: 16 BRPM | WEIGHT: 161.2 LBS | HEART RATE: 62 BPM

## 2019-02-28 DIAGNOSIS — E78.5 HYPERLIPIDEMIA, UNSPECIFIED HYPERLIPIDEMIA TYPE: ICD-10-CM

## 2019-02-28 DIAGNOSIS — Z23 NEED FOR VACCINATION: ICD-10-CM

## 2019-02-28 DIAGNOSIS — J44.9 CHRONIC OBSTRUCTIVE PULMONARY DISEASE, UNSPECIFIED COPD TYPE (HCC): ICD-10-CM

## 2019-02-28 DIAGNOSIS — Z85.46 HISTORY OF PROSTATE CANCER: ICD-10-CM

## 2019-02-28 DIAGNOSIS — G40.209 PARTIAL EPILEPSY WITH IMPAIRMENT OF CONSCIOUSNESS (HCC): ICD-10-CM

## 2019-02-28 DIAGNOSIS — E78.5 DYSLIPIDEMIA: ICD-10-CM

## 2019-02-28 PROCEDURE — 90471 IMMUNIZATION ADMIN: CPT | Performed by: INTERNAL MEDICINE

## 2019-02-28 PROCEDURE — 99204 OFFICE O/P NEW MOD 45 MIN: CPT | Performed by: INTERNAL MEDICINE

## 2019-02-28 PROCEDURE — 90670 PCV13 VACCINE IM: CPT | Performed by: INTERNAL MEDICINE

## 2019-02-28 NOTE — ASSESSMENT & PLAN NOTE
Lab Results   Component Value Date/Time    CHOLSTRLTOT 273 (H) 03/26/2018 07:08 AM     (H) 03/26/2018 07:08 AM    HDL 44 03/26/2018 07:08 AM    TRIGLYCERIDE 90 03/26/2018 07:08 AM     On pravastatin 20 mg daily. Has lost weight and made dietary modifications since his lipids were last checked 3/2018.

## 2019-02-28 NOTE — PROGRESS NOTES
[facility]ARE [facility]IN[facility] NEW PATIENT H&P  Chief Complaint   Patient presents with   • Seizure     lamoTRIgine (LAMICTAL) carbamazepine (CARBATROL)   • Other     Dyslipidemia- pravastatin (PRAVACHOL)   • COPD     albuterol (PROAIR HFA)    • Immunizations     prevnar      History of Present Illness     Partial epilepsy with impairment of consciousness  Following with Dr. Rutledge. On lamictal 300 mg bid and carbamazepine 300 mg bid.     History of prostate cancer  S/p prostatectomy in 2012. Following with Dr. Conde.     Dyslipidemia  Lab Results   Component Value Date/Time    CHOLSTRLTOT 273 (H) 03/26/2018 07:08 AM     (H) 03/26/2018 07:08 AM    HDL 44 03/26/2018 07:08 AM    TRIGLYCERIDE 90 03/26/2018 07:08 AM     On pravastatin 20 mg daily. Has lost weight and made dietary modifications since his lipids were last checked 3/2018.     COPD (Chronic Obstructive Pulmonary Disease)  On qvar and albuterol as needed. His breathing is the best this year it has been in a while.     Current Outpatient Prescriptions   Medication Sig Dispense Refill   • carbamazepine (CARBATROL) 300 MG CR capsule Take 1 Cap by mouth 2 times a day. 180 Cap 3   • lamoTRIgine (LAMICTAL) 100 MG Tab Take 1 Tab by mouth 2 times a day. Take with 200 mg to equal 300 mg 180 Tab 3   • lamotrigine (LAMICTAL) 200 MG tablet TAKE 1 TABLET BY MOUTH TWICE DAILY-GENERIC FOR LAMICTAL 180 Tab 3   • pravastatin (PRAVACHOL) 20 MG Tab TAKE 1 TABLET BY MOUTH EVERY EVENING-GENERIC FOR PRAVACHOL 30 Tab 0   • carbamazepine (CARBATROL) 300 MG CR capsule TAKE ONE CAPSULE BY MOUTH TWICE A  Cap 3   • albuterol (PROAIR HFA) 108 (90 Base) MCG/ACT Aero Soln inhalation aerosol INHALE 2 PUFFS BY MOUTH EVERY 6 HOURS ASNEEDED FOR SHORTNESS OF BREATH 8.5 g 5   • beclomethasone (QVAR) 40 MCG/ACT inhaler Inhale 1 Puff by mouth 2 Times a Day. 3 Inhaler 3     No current facility-administered medications for this visit.        Past Medical History:   Diagnosis Date   • ASTHMA   "  • Cancer (HCC)     prostate   • COPD (chronic obstructive pulmonary disease) (HCC)    • Dyslipidemia    • Partial epilepsy with impairment of consciousness (HCC)      ICD-10 transition     Past Surgical History:   Procedure Laterality Date   • PROSTATECTOMY ROBOTIC  2012    Performed by JACOB RAMOS at SURGERY [facility] ORS   • COLONOSCOPY  8/10    grade 1 int. hemorrhoids     Social History   Substance Use Topics   • Smoking status: Never Smoker   • Smokeless tobacco: Never Used   • Alcohol use 0.0 oz/week      Comment: 5 drinks/weekend     Social History     Social History Narrative    Retired      Family History   Problem Relation Age of Onset   • Diabetes Father    • Alcohol/Drug Brother    • Cancer Neg Hx    • Stroke Neg Hx    • Heart Disease Neg Hx      Family Status   Relation Status   • Mo         liver cirrhosis   • Fa         liver cirrhosis   • Bro         pt doesn't know cause of death   • Neg Hx (Not Specified)     Allergies: Patient has no known allergies.    ROS  Constitutional: Negative for fatigue/generalized weakness.   HEENT: Negative for  vision changes, hearing changes    Respiratory: Negative for shortness of breath  Cardiovascular: Negative for chest pain, palpitations  Gastrointestinal: Negative for blood in stool, constipation, diarrhea  Genitourinary: Negative for dysuria, polyuria  Musculoskeletal: Negative for myalgias, back pain, and joint pain.   Skin: Negative for rash  Neurological: Negative for numbness, tingling  Psychiatric/Behavioral: Negative for depression, anxiety       Objective   Blood pressure 124/70, pulse 62, temperature 36.7 °C (98 °F), resp. rate 16, height 1.727 m (5' 8\"), weight 73.1 kg (161 lb 3.2 oz), SpO2 100 %. Body mass index is 24.51 kg/m².    General: Alert, oriented. In no acute distress   HEET: EOMI, PERRL, conjunctiva non-injected, sclera non-icteric.  Nares patent with no significant congestion or drainage.  Leonor " pinnae, external auditory canals, TM pearly gray with normal light reflex bilaterally.Oral mucous membranes pink and moist with no lesions.  Neck: supple with no cervical, subclavicular lymphadenopathy, JVD, palpable thyroid nodules   Lungs: clear to auscultation bilaterally with good excursion.  CV: regular rate and rhythm.  Abdomen soft, non-distended, non-tender with normal bowel sounds. No hepatosplenomegaly, no masses palpated  Skin: no lesions. Warm, dry   Psychiatric: appropriate mood and affect     Assessment and Plan   The following treatment plan was discussed     1. Need for vaccination  - Prevnar 13 PCV-13    2. 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 Rutledge, neurologist. On lamictal and carbamazepine.   - Comp Metabolic Panel; Future  - CBC WITH DIFFERENTIAL; Future  - Lipid Profile; Future  - VITAMIN D,25 HYDROXY; Future    3. History of prostate cancer  - PROSTATE SPECIFIC AG    4. Dyslipidemia  Due for repeat labs. Has improved diet and lost weight since last checked 3/2018.     5. Chronic obstructive pulmonary disease, unspecified COPD type (HCC)  Stable on Qvar and albuterol prn.       Return in about 1 year (around 2/28/2020).    Health Maintenance      Health Maintenance Due   Topic Date Due   • IMM PNEUMOCOCCAL 19-64 (ADULT) MEDIUM RISK SERIES (1 of 1 - PPSV23) 05/24/1978   • PFT SCREENING-FEV1 AND FEV/FVC RATIO / SPIROMETRY SHOULD BE PERFORMED ANNUALLY  02/06/2010   • IMM INFLUENZA (1) 09/01/2018       Osvaldo Lopez MD  Internal Medicine  [facility]

## 2019-05-03 DIAGNOSIS — E78.5 DYSLIPIDEMIA: ICD-10-CM

## 2019-05-06 RX ORDER — PRAVASTATIN SODIUM 20 MG
TABLET ORAL
Qty: 90 TAB | Refills: 3 | Status: SHIPPED | OUTPATIENT
Start: 2019-05-06 | End: 2019-11-22

## 2019-11-22 ENCOUNTER — OFFICE VISIT (OUTPATIENT)
Dept: MEDICAL GROUP | Facility: IMAGING CENTER | Age: 59
End: 2019-11-22
Payer: COMMERCIAL

## 2019-11-22 VITALS
HEIGHT: 68 IN | OXYGEN SATURATION: 99 % | DIASTOLIC BLOOD PRESSURE: 84 MMHG | HEART RATE: 62 BPM | WEIGHT: 175.5 LBS | TEMPERATURE: 98.1 F | BODY MASS INDEX: 26.6 KG/M2 | RESPIRATION RATE: 17 BRPM | SYSTOLIC BLOOD PRESSURE: 128 MMHG

## 2019-11-22 DIAGNOSIS — R79.89 LOW VITAMIN D LEVEL: ICD-10-CM

## 2019-11-22 DIAGNOSIS — J44.9 CHRONIC OBSTRUCTIVE PULMONARY DISEASE, UNSPECIFIED COPD TYPE (HCC): ICD-10-CM

## 2019-11-22 DIAGNOSIS — E78.5 DYSLIPIDEMIA: ICD-10-CM

## 2019-11-22 DIAGNOSIS — G40.209 PARTIAL EPILEPSY WITH IMPAIRMENT OF CONSCIOUSNESS (HCC): ICD-10-CM

## 2019-11-22 DIAGNOSIS — Z23 NEED FOR VACCINATION: ICD-10-CM

## 2019-11-22 DIAGNOSIS — Z11.59 ENCOUNTER FOR HEPATITIS C SCREENING TEST FOR LOW RISK PATIENT: ICD-10-CM

## 2019-11-22 DIAGNOSIS — Z85.46 HISTORY OF PROSTATE CANCER: ICD-10-CM

## 2019-11-22 PROCEDURE — 99214 OFFICE O/P EST MOD 30 MIN: CPT | Mod: 25 | Performed by: FAMILY MEDICINE

## 2019-11-22 PROCEDURE — 90686 IIV4 VACC NO PRSV 0.5 ML IM: CPT | Performed by: FAMILY MEDICINE

## 2019-11-22 PROCEDURE — 90471 IMMUNIZATION ADMIN: CPT | Performed by: FAMILY MEDICINE

## 2019-11-22 ASSESSMENT — PAIN SCALES - GENERAL: PAINLEVEL: NO PAIN

## 2019-11-22 NOTE — PROGRESS NOTES
Subjective:     CC:    Chief Complaint   Patient presents with   • Establish Care     previous doctor: Dr. Lopez in [place].    • Seizure     hx of seizures. started taking cbd oil for it last month, seems to be decreasing number of seizures.        HISTORY OF THE PRESENT ILLNESS: This pleasant 59 y.o. male is here to establish care. His/her prior PCP was Dr. Lopez who moved out of the [facility] area.     He reports that he has had partial seizures since 1977.  He has never really had them controlled.  He notices that he has seizures at least 3 times per week.  Though the wife notices that he has them multiple times per day.  She had taken over making sure that he was taking his medication as prescribed.  So she reports that for 6-month.  He had not missed a dose.  Though he did miss a dose a couple weeks ago and then had seizures all day long.  Seizure medications confirmed and in the chart.  Patient takes Lamictal 300 mg twice a day and carbamazepine 300 mg twice a day.  He used to be a heavy daily drinker beer.  He has slowly reduced alcohol consumption over the last 10 to 20 years.  He was still drinking regularly though 3 months ago.  He reports now he is drinking beer about once or twice a month.  He reports that he does not think alcohol has anything to do with his seizures.  Patient does not take baths.  They have fall protocols in place for when he misses doses.  Had pneumococcal 13 February 2019.  Because the wife noticed he had been compliant on his medications for so long they started taking CBD 2 times per day for the past month.  She thinks this has reduced his seizure activity.  He follows with a neurologist.    He was also diagnosed with COPD despite never being a cigarette smoker.  This was confirmed with pulmonary function test showing hyperinflation of the lungs.  He uses Qvar and albuterol with good results.  He does have some symptoms when his neighbor burns trash.    Allergies: Patient has no known  allergies.    Current Outpatient Medications Ordered in Epic   Medication Sig Dispense Refill   • NON SPECIFIED      • carbamazepine (CARBATROL) 300 MG CR capsule Take 1 Cap by mouth 2 times a day. 180 Cap 3   • lamoTRIgine (LAMICTAL) 100 MG Tab Take 1 Tab by mouth 2 times a day. Take with 200 mg to equal 300 mg 180 Tab 3   • lamotrigine (LAMICTAL) 200 MG tablet TAKE 1 TABLET BY MOUTH TWICE DAILY-GENERIC FOR LAMICTAL 180 Tab 3   • pravastatin (PRAVACHOL) 20 MG Tab TAKE 1 TABLET BY MOUTH EVERY EVENING-GENERIC FOR PRAVACHOL 30 Tab 0   • albuterol (PROAIR HFA) 108 (90 Base) MCG/ACT Aero Soln inhalation aerosol INHALE 2 PUFFS BY MOUTH EVERY 6 HOURS ASNEEDED FOR SHORTNESS OF BREATH 8.5 g 5   • beclomethasone (QVAR) 40 MCG/ACT inhaler Inhale 1 Puff by mouth 2 Times a Day. 3 Inhaler 3     No current Epic-ordered facility-administered medications on file.        Past Medical History:   Diagnosis Date   • ASTHMA    • Cancer (HCC) 5/12    prostate   • COPD (chronic obstructive pulmonary disease) (HCC)    • Dyslipidemia    • Partial epilepsy with impairment of consciousness (HCC)      ICD-10 transition       Past Surgical History:   Procedure Laterality Date   • PROSTATECTOMY ROBOTIC  5/29/2012    Performed by JACOB RAMOS at SURGERY [facility] ORS   • COLONOSCOPY  8/10    grade 1 int. hemorrhoids       Social History     Tobacco Use   • Smoking status: Never Smoker   • Smokeless tobacco: Never Used   Substance Use Topics   • Alcohol use: Yes     Alcohol/week: 0.0 oz     Comment: 5 drinks/weekend   • Drug use: No     Comment: previous       Social History     Patient does not qualify to have social determinant information on file (likely too young).   Social History Narrative    Retired        Family History   Problem Relation Age of Onset   • Diabetes Father    • Alcohol/Drug Brother    • Cancer Neg Hx    • Stroke Neg Hx    • Heart Disease Neg Hx        ROS:   Gen: no fevers/chills, no changes in weight  Eyes: no  "changes in vision  ENT: no sore throat, no hearing loss  Pulm: no sob, no cough  CV: no chest pain, no palpitations  GI: no nausea/vomiting, no diarrhea  : no dysuria  MSk: no myalgias  Skin: no rash  Neuro: no headaches, no numbness/tingling  Heme/Lymph: no easy bruising      Objective:     Exam: /84 (BP Location: Right arm, Patient Position: Sitting, BP Cuff Size: Adult)   Pulse 62   Temp 36.7 °C (98.1 °F) (Temporal)   Resp 17   Ht 1.727 m (5' 8\")   Wt 79.6 kg (175 lb 8 oz)   SpO2 99%  Body mass index is 26.68 kg/m².    General: Normal appearing. No distress.  HEENT: Normocephalic. Eyes conjunctiva clear lids without ptosis, eomi  Neck: Thyroid is not enlarged.  Pulmonary: Clear to ausculation.  Normal effort. No rales, ronchi, or wheezing.  Cardiovascular: Regular rate and rhythm without murmur. Carotid and radial pulses are intact and equal bilaterally.  Neurologic: No facial droop, normal gait, alert and oriented  Lymph: No cervical or supraclavicular lymph nodes are palpable  Skin: Warm and dry.  No obvious lesions.  Musculoskeletal: No extremity cyanosis, clubbing, or edema.  Muscle strength 5+ in all 4 extremities.  Psych: Normal mood and affect. Judgment and insight is normal.      Assessment & Plan:   59 y.o. male with the following -    Problem List Items Addressed This Visit     Dyslipidemia    Relevant Orders    Lipid Profile    COPD (chronic obstructive pulmonary disease) (HCC)    History of prostate cancer    Relevant Orders    PSA TOTAL + %FREE    Partial epilepsy with impairment of consciousness (HCC)    Relevant Orders    CBC WITH DIFFERENTIAL    Comp Metabolic Panel    CARBAMAZEPINE    LAMOTRIGINE      Other Visit Diagnoses     Low vitamin D level        Relevant Orders    VITAMIN D,25 HYDROXY    Encounter for hepatitis C screening test for low risk patient        Relevant Orders    HEP C VIRUS ANTIBODY    Need for vaccination        Relevant Orders    Influenza Vaccine Quad " Injection (PF) (Completed)        Problem   Copd (Chronic Obstructive Pulmonary Disease) (Hcc)    Never smoked cigarettes, few times per marjijuana smoker socially 18 yo. Mild confimred via pft       Return for annual.    Please note that this dictation was created using voice recognition software. I have made every reasonable attempt to correct obvious errors, but I expect that there are errors of grammar and possibly content that I did not discover before finalizing the note.

## 2019-12-31 DIAGNOSIS — G40.209 PARTIAL EPILEPSY WITH IMPAIRMENT OF CONSCIOUSNESS (HCC): ICD-10-CM

## 2019-12-31 RX ORDER — CARBAMAZEPINE 300 MG/1
300 CAPSULE, EXTENDED RELEASE ORAL 2 TIMES DAILY
Qty: 180 CAP | Refills: 3 | Status: CANCELLED | OUTPATIENT
Start: 2019-12-31

## 2019-12-31 RX ORDER — LAMOTRIGINE 200 MG/1
TABLET ORAL
Qty: 180 TAB | Refills: 3 | Status: CANCELLED | OUTPATIENT
Start: 2019-12-31

## 2020-01-02 DIAGNOSIS — G40.209 PARTIAL EPILEPSY WITH IMPAIRMENT OF CONSCIOUSNESS (HCC): ICD-10-CM

## 2020-01-02 RX ORDER — CARBAMAZEPINE 300 MG/1
300 CAPSULE, EXTENDED RELEASE ORAL 2 TIMES DAILY
Qty: 180 CAP | Refills: 3 | Status: SHIPPED | OUTPATIENT
Start: 2020-01-02 | End: 2021-02-01 | Stop reason: SDUPTHER

## 2020-01-02 RX ORDER — LAMOTRIGINE 200 MG/1
TABLET ORAL
Qty: 180 TAB | Refills: 3 | Status: SHIPPED | OUTPATIENT
Start: 2020-01-02 | End: 2021-02-01 | Stop reason: SDUPTHER

## 2020-01-02 RX ORDER — LAMOTRIGINE 100 MG/1
100 TABLET ORAL 2 TIMES DAILY
Qty: 180 TAB | Refills: 3 | Status: SHIPPED | OUTPATIENT
Start: 2020-01-02 | End: 2021-02-01 | Stop reason: SDUPTHER

## 2020-01-02 NOTE — TELEPHONE ENCOUNTER
Was the patient seen in the last year in this department? Yes    Does patient have an active prescription for medications requested? Yes    Received Request Via: Pharmacy     Pt with appt to see dr Rutledge 2/5/20

## 2020-06-16 DIAGNOSIS — E78.5 DYSLIPIDEMIA: ICD-10-CM

## 2020-06-16 RX ORDER — PRAVASTATIN SODIUM 20 MG
TABLET ORAL
Qty: 90 TAB | Refills: 0 | Status: SHIPPED | OUTPATIENT
Start: 2020-06-16 | End: 2020-09-16 | Stop reason: SDUPTHER

## 2020-06-16 NOTE — TELEPHONE ENCOUNTER
Received request via: Pharmacy    Was the patient seen in the last year in this department? Yes LOV 11/22/2019    Does the patient have an active prescription (recently filled or refills available) for medication(s) requested? No

## 2020-08-13 ENCOUNTER — HOSPITAL ENCOUNTER (OUTPATIENT)
Dept: LAB | Facility: MEDICAL CENTER | Age: 60
End: 2020-08-13
Attending: FAMILY MEDICINE
Payer: COMMERCIAL

## 2020-08-13 DIAGNOSIS — Z11.59 ENCOUNTER FOR HEPATITIS C SCREENING TEST FOR LOW RISK PATIENT: ICD-10-CM

## 2020-08-13 DIAGNOSIS — Z85.46 HISTORY OF PROSTATE CANCER: ICD-10-CM

## 2020-08-13 DIAGNOSIS — E78.5 DYSLIPIDEMIA: ICD-10-CM

## 2020-08-13 DIAGNOSIS — G40.209 PARTIAL EPILEPSY WITH IMPAIRMENT OF CONSCIOUSNESS (HCC): ICD-10-CM

## 2020-08-13 DIAGNOSIS — R79.89 LOW VITAMIN D LEVEL: ICD-10-CM

## 2020-08-13 LAB
25(OH)D3 SERPL-MCNC: 26 NG/ML (ref 30–100)
ALBUMIN SERPL BCP-MCNC: 4.7 G/DL (ref 3.2–4.9)
ALBUMIN/GLOB SERPL: 2 G/DL
ALP SERPL-CCNC: 87 U/L (ref 30–99)
ALT SERPL-CCNC: 15 U/L (ref 2–50)
ANION GAP SERPL CALC-SCNC: 11 MMOL/L (ref 7–16)
AST SERPL-CCNC: 14 U/L (ref 12–45)
BASOPHILS # BLD AUTO: 0.8 % (ref 0–1.8)
BASOPHILS # BLD: 0.04 K/UL (ref 0–0.12)
BILIRUB SERPL-MCNC: 0.3 MG/DL (ref 0.1–1.5)
BUN SERPL-MCNC: 12 MG/DL (ref 8–22)
CALCIUM SERPL-MCNC: 9.5 MG/DL (ref 8.5–10.5)
CARBAMAZEPINE SERPL-MCNC: 8 UG/ML (ref 4–12)
CHLORIDE SERPL-SCNC: 104 MMOL/L (ref 96–112)
CHOLEST SERPL-MCNC: 219 MG/DL (ref 100–199)
CO2 SERPL-SCNC: 25 MMOL/L (ref 20–33)
CREAT SERPL-MCNC: 0.86 MG/DL (ref 0.5–1.4)
EOSINOPHIL # BLD AUTO: 0.27 K/UL (ref 0–0.51)
EOSINOPHIL NFR BLD: 5.5 % (ref 0–6.9)
ERYTHROCYTE [DISTWIDTH] IN BLOOD BY AUTOMATED COUNT: 43.9 FL (ref 35.9–50)
FASTING STATUS PATIENT QL REPORTED: NORMAL
GLOBULIN SER CALC-MCNC: 2.3 G/DL (ref 1.9–3.5)
GLUCOSE SERPL-MCNC: 92 MG/DL (ref 65–99)
HCT VFR BLD AUTO: 43.7 % (ref 42–52)
HCV AB SER QL: NORMAL
HDLC SERPL-MCNC: 58 MG/DL
HGB BLD-MCNC: 14.8 G/DL (ref 14–18)
IMM GRANULOCYTES # BLD AUTO: 0.02 K/UL (ref 0–0.11)
IMM GRANULOCYTES NFR BLD AUTO: 0.4 % (ref 0–0.9)
LDLC SERPL CALC-MCNC: 142 MG/DL
LYMPHOCYTES # BLD AUTO: 1.36 K/UL (ref 1–4.8)
LYMPHOCYTES NFR BLD: 27.8 % (ref 22–41)
MCH RBC QN AUTO: 32.1 PG (ref 27–33)
MCHC RBC AUTO-ENTMCNC: 33.9 G/DL (ref 33.7–35.3)
MCV RBC AUTO: 94.8 FL (ref 81.4–97.8)
MONOCYTES # BLD AUTO: 0.38 K/UL (ref 0–0.85)
MONOCYTES NFR BLD AUTO: 7.8 % (ref 0–13.4)
NEUTROPHILS # BLD AUTO: 2.82 K/UL (ref 1.82–7.42)
NEUTROPHILS NFR BLD: 57.7 % (ref 44–72)
NRBC # BLD AUTO: 0 K/UL
NRBC BLD-RTO: 0 /100 WBC
PLATELET # BLD AUTO: 263 K/UL (ref 164–446)
PMV BLD AUTO: 8.8 FL (ref 9–12.9)
POTASSIUM SERPL-SCNC: 4 MMOL/L (ref 3.6–5.5)
PROT SERPL-MCNC: 7 G/DL (ref 6–8.2)
RBC # BLD AUTO: 4.61 M/UL (ref 4.7–6.1)
SODIUM SERPL-SCNC: 140 MMOL/L (ref 135–145)
TRIGL SERPL-MCNC: 96 MG/DL (ref 0–149)
WBC # BLD AUTO: 4.9 K/UL (ref 4.8–10.8)

## 2020-08-13 PROCEDURE — 84153 ASSAY OF PSA TOTAL: CPT

## 2020-08-13 PROCEDURE — 80053 COMPREHEN METABOLIC PANEL: CPT

## 2020-08-13 PROCEDURE — 86803 HEPATITIS C AB TEST: CPT

## 2020-08-13 PROCEDURE — 80175 DRUG SCREEN QUAN LAMOTRIGINE: CPT

## 2020-08-13 PROCEDURE — 85025 COMPLETE CBC W/AUTO DIFF WBC: CPT

## 2020-08-13 PROCEDURE — 84154 ASSAY OF PSA FREE: CPT

## 2020-08-13 PROCEDURE — 80061 LIPID PANEL: CPT

## 2020-08-13 PROCEDURE — 36415 COLL VENOUS BLD VENIPUNCTURE: CPT

## 2020-08-13 PROCEDURE — 80156 ASSAY CARBAMAZEPINE TOTAL: CPT

## 2020-08-13 PROCEDURE — 82306 VITAMIN D 25 HYDROXY: CPT

## 2020-08-15 LAB
LAMOTRIGINE SERPL-MCNC: 8 UG/ML (ref 2.5–15)
PSA FREE MFR SERPL: NORMAL %
PSA FREE SERPL-MCNC: <0.1 NG/ML
PSA SERPL-MCNC: <0.1 NG/ML (ref 0–4)

## 2020-09-16 DIAGNOSIS — E78.5 DYSLIPIDEMIA: ICD-10-CM

## 2020-09-16 NOTE — TELEPHONE ENCOUNTER
Received request via: Pharmacy    Was the patient seen in the last year in this department? Yes  11/22/19  Does the patient have an active prescription (recently filled or refills available) for medication(s) requested? No

## 2020-09-18 RX ORDER — PRAVASTATIN SODIUM 20 MG
TABLET ORAL
Qty: 90 TAB | Refills: 0 | Status: SHIPPED | OUTPATIENT
Start: 2020-09-18 | End: 2021-01-15

## 2021-02-01 ENCOUNTER — OFFICE VISIT (OUTPATIENT)
Dept: NEUROLOGY | Facility: MEDICAL CENTER | Age: 61
End: 2021-02-01
Attending: PSYCHIATRY & NEUROLOGY
Payer: COMMERCIAL

## 2021-02-01 VITALS
DIASTOLIC BLOOD PRESSURE: 78 MMHG | HEIGHT: 68 IN | HEART RATE: 79 BPM | SYSTOLIC BLOOD PRESSURE: 150 MMHG | BODY MASS INDEX: 27.6 KG/M2 | TEMPERATURE: 97.6 F | OXYGEN SATURATION: 97 % | WEIGHT: 182.1 LBS

## 2021-02-01 DIAGNOSIS — G40.209 PARTIAL EPILEPSY WITH IMPAIRMENT OF CONSCIOUSNESS (HCC): Primary | ICD-10-CM

## 2021-02-01 DIAGNOSIS — G25.1 TREMOR DUE TO MULTIPLE DRUGS: ICD-10-CM

## 2021-02-01 PROCEDURE — 99214 OFFICE O/P EST MOD 30 MIN: CPT | Performed by: PSYCHIATRY & NEUROLOGY

## 2021-02-01 PROCEDURE — 99212 OFFICE O/P EST SF 10 MIN: CPT | Performed by: PSYCHIATRY & NEUROLOGY

## 2021-02-01 RX ORDER — LAMOTRIGINE 100 MG/1
100 TABLET ORAL 2 TIMES DAILY
Qty: 180 TAB | Refills: 3 | Status: ON HOLD
Start: 2021-02-01 | End: 2021-04-02

## 2021-02-01 RX ORDER — LAMOTRIGINE 200 MG/1
TABLET ORAL
Qty: 180 TAB | Refills: 3 | Status: ON HOLD
Start: 2021-02-01 | End: 2021-04-02

## 2021-02-01 RX ORDER — LAMOTRIGINE 25 MG/1
25 TABLET ORAL DAILY
Qty: 60 TAB | Refills: 5 | Status: ON HOLD
Start: 2021-02-01 | End: 2021-04-02

## 2021-02-01 RX ORDER — CARBAMAZEPINE 300 MG/1
300 CAPSULE, EXTENDED RELEASE ORAL 2 TIMES DAILY
Qty: 180 CAP | Refills: 3 | Status: ON HOLD
Start: 2021-02-01 | End: 2021-04-02

## 2021-02-01 ASSESSMENT — PATIENT HEALTH QUESTIONNAIRE - PHQ9: CLINICAL INTERPRETATION OF PHQ2 SCORE: 0

## 2021-02-01 ASSESSMENT — ENCOUNTER SYMPTOMS
MEMORY LOSS: 0
FALLS: 0
LOSS OF CONSCIOUSNESS: 1
SEIZURES: 1
TINGLING: 0
WEAKNESS: 0
TREMORS: 1

## 2021-02-01 ASSESSMENT — FIBROSIS 4 INDEX: FIB4 SCORE: 0.84

## 2021-02-01 NOTE — PROGRESS NOTES
Subjective:      Fredy Zavala is a 60 y.o. male who presents with his wife Jovita, as always, and who always provides additional history, for follow-up, last seen almost 2 years ago, with a history of focal onset seizures with altered sensorium associated with MTS bilaterally.    HPI    Fredy has continued to have seizures.  He seems to be only aware of them once or twice in a week, this frequency has not changed.  Jovita observes more of them, they still occur daily, but these he is mostly unaware.  They seem to be lasting longer, historically maybe 1 minute, now 2-3 minutes, though he still has about a 15-minute post drome with any of these.  There is no diurnal pattern to them.  He has, fortunately, not had a secondarily generalized event.    When last seen in February, 2019, we had a long discussion about EMU admission for better monitoring.  I have been concerned about non epileptic as well as epileptic events occurring.  He certainly has risk for the latter (positive imaging), and there is a higher incidence of the former (estimates of upwards of 10-17%) in patients with epileptic seizures.    He is compliant with medication, generally make sure of this.  Still, she falls asleep before he takes them, it is pretty much a 50/50 chance that they will be taken.  He is on Tegretol 300 mg, twice daily and lamotrigine 300 mg, twice daily.  They are using CBD product twice a day on a daily basis, he thinks it is helping, his wife is not so sure.    The tremor with the drugs is still frustrating for him.  Levels drawn in August, 2020, where 8.0 for both drugs.  CBC and liver profiles were unremarkable.    Medical, surgical and family histories are reviewed, there are no new drug allergies.  He had last seen a PCP, Dr. Toshia Rendon in November, 2019, her notes reviewed indicated he was admitted to having a drink a couple of times in a month, but since Covid-19 restrictions in early 2020, he has had no alcohol  "whatsoever.  There had been no consistent issue with seizure recurrence after a drink.    He is on Carbatrol 300 mg, twice daily, lamotrigine 300 mg, twice daily, Qvar and albuterol inhaler, CBD product twice daily, and Pravachol 20 mg daily.    Review of Systems   Constitutional: Negative for malaise/fatigue.   Musculoskeletal: Negative for falls.   Neurological: Positive for tremors, seizures and loss of consciousness. Negative for tingling and weakness.   Psychiatric/Behavioral: Negative for memory loss.   All other systems reviewed and are negative.       Objective:     /78 (BP Location: Right arm, Patient Position: Sitting, BP Cuff Size: Adult)   Pulse 79   Temp 36.4 °C (97.6 °F) (Temporal)   Ht 1.727 m (5' 8\")   Wt 82.6 kg (182 lb 1.6 oz)   SpO2 97%   BMI 27.69 kg/m²      Physical Exam    He appears in no acute distress.  His vital signs are stable, blood pressure slightly elevated 150/78.  There is no malar rash.  The neck is supple.  Cardiac evaluation reveals a regular rhythm.    He is fully oriented, there is no aphasia, apraxia, or inattention.    PERRLA/EOMI, visual fields are full to finger counting bilaterally, facial movements are symmetric, there is no dysarthria, the tongue and uvula are midline without bulbar dysfunction.  Sensory exam is intact to temperature.  Shoulder shrug and head rotation are intact and symmetric.    Musculoskeletal exam reveals the course tremulousness in both upper extremities.  It is increased with sustained posture against gravity, diminished slightly when he is at rest though it remains.  It is symmetric in the upper extremities.  Tone is normal, there is no asterixis or drift.  Strength is 5/5 throughout.  Reflexes are diminished at the ankles but brisk and easily elicited elsewhere.  There are no asymmetries.    He stands easily, station is slightly widened but heel, toe, and tandem walk are also normal.  Armswing is symmetric, there is no circumduction.  " There is no appendicular dystaxia, tremulousness is seen through full range of motion without change on intention.  Repetitive movements show good amplitude and normal frequencies in all 4 extremities, without asymmetries.    Sensory exam is intact to vibration and temperature, there is a stocking pattern diminished vibratory perception in the lower extremities below the shins.  Romberg is absent.     Assessment/Plan:     1. Partial epilepsy with impairment of consciousness (HCC)  I am still concerned about the frequency and nature of the seizures he is having, especially if they are lasting a little bit longer.  I think this time he understands the need for EMU admission, he will be scheduled on March 29, 2021 for 5-day admission.  Was told exactly what the reasoning is, what can be done, and why it is important to identify both epileptic and nonepileptic attacks, if they exist in the same individual, since both are treated differently, and treatments for the former can sometimes make the latter type of episodes worse.    He was also told that this may be a way for us to get him off medication and in that circumstance, improve the tremulousness that he has.  In the meantime I am more concerned about seizure control, despite the possibility of increasing tremor, so he was told to increase the Lamictal by 25 mg dose twice a day.  Thus 325 mg, twice daily along with the Carbatrol 300 mg, twice daily.  I will follow-up with him after the admission and evaluation.    - lamotrigine (LAMICTAL) 200 MG tablet; TAKE 1 TABLET BY MOUTH TWICE DAILY-GENERIC FOR LAMICTAL  Dispense: 180 Tab; Refill: 3  - lamoTRIgine (LAMICTAL) 100 MG Tab; Take 1 Tab by mouth 2 times a day. Take with 200 mg to equal 300 mg  Dispense: 180 Tab; Refill: 3  - carbamazepine (CARBATROL) 300 MG CR capsule; Take 1 Cap by mouth 2 times a day.  Dispense: 180 Cap; Refill: 3  - lamoTRIgine (LAMICTAL) 25 MG Tab; Take 1 Tab by mouth every day.  Dispense: 60 Tab;  Refill: 5  - REFERRAL TO NEURODIAGNOSTICS (EEG,EP,EMG/NCS/DBS)    2. Tremor due to multiple drugs  Due to both the Tegretol and lamotrigine, its not clear how much of each drug is playing a role.  Hopefully we will get a sense when we push up the lamotrigine, since this would be the first medicine to try to reduce following EMU admission.  Drug levels right now are stable, he is dealing with the tremulousness though he is frustrated.    Time: 25 minutes spent face-to-face for exam, review, discussion, and education, of this over 50% of the time spent counseling and coordinating care.

## 2021-03-15 DIAGNOSIS — Z23 NEED FOR VACCINATION: ICD-10-CM

## 2021-03-20 ENCOUNTER — IMMUNIZATION (OUTPATIENT)
Dept: FAMILY PLANNING/WOMEN'S HEALTH CLINIC | Facility: IMMUNIZATION CENTER | Age: 61
End: 2021-03-20
Attending: INTERNAL MEDICINE
Payer: COMMERCIAL

## 2021-03-20 DIAGNOSIS — Z23 NEED FOR VACCINATION: ICD-10-CM

## 2021-03-20 DIAGNOSIS — Z23 ENCOUNTER FOR VACCINATION: Primary | ICD-10-CM

## 2021-03-20 PROCEDURE — 91300 PFIZER SARS-COV-2 VACCINE: CPT | Performed by: INTERNAL MEDICINE

## 2021-03-20 PROCEDURE — 0001A PFIZER SARS-COV-2 VACCINE: CPT | Performed by: INTERNAL MEDICINE

## 2021-03-28 DIAGNOSIS — G40.209 PARTIAL EPILEPSY WITH IMPAIRMENT OF CONSCIOUSNESS (HCC): ICD-10-CM

## 2021-03-28 DIAGNOSIS — R52 PAIN: ICD-10-CM

## 2021-03-28 DIAGNOSIS — Z29.9 PROPHYLACTIC MEASURE: ICD-10-CM

## 2021-03-28 RX ORDER — IBUPROFEN 400 MG/1
400 TABLET ORAL EVERY 6 HOURS PRN
Status: CANCELLED | OUTPATIENT
Start: 2021-03-28

## 2021-03-28 RX ORDER — LORAZEPAM 2 MG/ML
1 INJECTION INTRAMUSCULAR
Status: CANCELLED | OUTPATIENT
Start: 2021-03-28

## 2021-03-28 RX ORDER — DOCUSATE SODIUM 100 MG/1
100 CAPSULE, LIQUID FILLED ORAL 2 TIMES DAILY PRN
Status: CANCELLED | OUTPATIENT
Start: 2021-03-28

## 2021-03-28 RX ORDER — LORAZEPAM 2 MG/ML
2 INJECTION INTRAMUSCULAR
Status: CANCELLED | OUTPATIENT
Start: 2021-03-28

## 2021-03-29 ENCOUNTER — HOSPITAL ENCOUNTER (INPATIENT)
Facility: MEDICAL CENTER | Age: 61
LOS: 5 days | DRG: 101 | End: 2021-04-03
Attending: PSYCHIATRY & NEUROLOGY | Admitting: STUDENT IN AN ORGANIZED HEALTH CARE EDUCATION/TRAINING PROGRAM
Payer: COMMERCIAL

## 2021-03-29 DIAGNOSIS — E78.5 DYSLIPIDEMIA: ICD-10-CM

## 2021-03-29 DIAGNOSIS — J44.9 CHRONIC OBSTRUCTIVE PULMONARY DISEASE, UNSPECIFIED COPD TYPE (HCC): ICD-10-CM

## 2021-03-29 DIAGNOSIS — Z29.9 PROPHYLACTIC MEASURE: ICD-10-CM

## 2021-03-29 DIAGNOSIS — R52 PAIN: ICD-10-CM

## 2021-03-29 DIAGNOSIS — G40.209 PARTIAL EPILEPSY WITH IMPAIRMENT OF CONSCIOUSNESS (HCC): ICD-10-CM

## 2021-03-29 LAB
ALBUMIN SERPL BCP-MCNC: 4.2 G/DL (ref 3.2–4.9)
ALBUMIN/GLOB SERPL: 1.5 G/DL
ALP SERPL-CCNC: 90 U/L (ref 30–99)
ALT SERPL-CCNC: 22 U/L (ref 2–50)
AMMONIA PLAS-SCNC: 39 UMOL/L (ref 11–45)
ANION GAP SERPL CALC-SCNC: 10 MMOL/L (ref 7–16)
AST SERPL-CCNC: 17 U/L (ref 12–45)
BASOPHILS # BLD AUTO: 0.6 % (ref 0–1.8)
BASOPHILS # BLD: 0.02 K/UL (ref 0–0.12)
BILIRUB SERPL-MCNC: 0.3 MG/DL (ref 0.1–1.5)
BUN SERPL-MCNC: 9 MG/DL (ref 8–22)
CALCIUM SERPL-MCNC: 9.4 MG/DL (ref 8.5–10.5)
CARBAMAZEPINE SERPL-MCNC: 7 UG/ML (ref 4–12)
CHLORIDE SERPL-SCNC: 105 MMOL/L (ref 96–112)
CO2 SERPL-SCNC: 24 MMOL/L (ref 20–33)
CREAT SERPL-MCNC: 0.77 MG/DL (ref 0.5–1.4)
EOSINOPHIL # BLD AUTO: 0.06 K/UL (ref 0–0.51)
EOSINOPHIL NFR BLD: 1.9 % (ref 0–6.9)
ERYTHROCYTE [DISTWIDTH] IN BLOOD BY AUTOMATED COUNT: 44.7 FL (ref 35.9–50)
GLOBULIN SER CALC-MCNC: 2.8 G/DL (ref 1.9–3.5)
GLUCOSE SERPL-MCNC: 137 MG/DL (ref 65–99)
HCT VFR BLD AUTO: 42.2 % (ref 42–52)
HGB BLD-MCNC: 13.8 G/DL (ref 14–18)
IMM GRANULOCYTES # BLD AUTO: 0.01 K/UL (ref 0–0.11)
IMM GRANULOCYTES NFR BLD AUTO: 0.3 % (ref 0–0.9)
LYMPHOCYTES # BLD AUTO: 0.97 K/UL (ref 1–4.8)
LYMPHOCYTES NFR BLD: 31.3 % (ref 22–41)
MCH RBC QN AUTO: 30.5 PG (ref 27–33)
MCHC RBC AUTO-ENTMCNC: 32.7 G/DL (ref 33.7–35.3)
MCV RBC AUTO: 93.4 FL (ref 81.4–97.8)
MONOCYTES # BLD AUTO: 0.26 K/UL (ref 0–0.85)
MONOCYTES NFR BLD AUTO: 8.4 % (ref 0–13.4)
NEUTROPHILS # BLD AUTO: 1.78 K/UL (ref 1.82–7.42)
NEUTROPHILS NFR BLD: 57.5 % (ref 44–72)
NRBC # BLD AUTO: 0 K/UL
NRBC BLD-RTO: 0 /100 WBC
PLATELET # BLD AUTO: 268 K/UL (ref 164–446)
PMV BLD AUTO: 8.8 FL (ref 9–12.9)
POTASSIUM SERPL-SCNC: 3.9 MMOL/L (ref 3.6–5.5)
PROT SERPL-MCNC: 7 G/DL (ref 6–8.2)
RBC # BLD AUTO: 4.52 M/UL (ref 4.7–6.1)
SODIUM SERPL-SCNC: 139 MMOL/L (ref 135–145)
WBC # BLD AUTO: 3.1 K/UL (ref 4.8–10.8)

## 2021-03-29 PROCEDURE — 4A10X4Z MONITORING OF CENTRAL NERVOUS ELECTRICAL ACTIVITY, EXTERNAL APPROACH: ICD-10-PCS | Performed by: STUDENT IN AN ORGANIZED HEALTH CARE EDUCATION/TRAINING PROGRAM

## 2021-03-29 PROCEDURE — 80156 ASSAY CARBAMAZEPINE TOTAL: CPT

## 2021-03-29 PROCEDURE — 700111 HCHG RX REV CODE 636 W/ 250 OVERRIDE (IP): Performed by: STUDENT IN AN ORGANIZED HEALTH CARE EDUCATION/TRAINING PROGRAM

## 2021-03-29 PROCEDURE — 80053 COMPREHEN METABOLIC PANEL: CPT

## 2021-03-29 PROCEDURE — 82140 ASSAY OF AMMONIA: CPT

## 2021-03-29 PROCEDURE — U0003 INFECTIOUS AGENT DETECTION BY NUCLEIC ACID (DNA OR RNA); SEVERE ACUTE RESPIRATORY SYNDROME CORONAVIRUS 2 (SARS-COV-2) (CORONAVIRUS DISEASE [COVID-19]), AMPLIFIED PROBE TECHNIQUE, MAKING USE OF HIGH THROUGHPUT TECHNOLOGIES AS DESCRIBED BY CMS-2020-01-R: HCPCS

## 2021-03-29 PROCEDURE — 85025 COMPLETE CBC W/AUTO DIFF WBC: CPT

## 2021-03-29 PROCEDURE — 302135 SEQUENTIAL COMPRESSION MACHINE: Performed by: STUDENT IN AN ORGANIZED HEALTH CARE EDUCATION/TRAINING PROGRAM

## 2021-03-29 PROCEDURE — 94640 AIRWAY INHALATION TREATMENT: CPT

## 2021-03-29 PROCEDURE — 99223 1ST HOSP IP/OBS HIGH 75: CPT | Mod: 25 | Performed by: STUDENT IN AN ORGANIZED HEALTH CARE EDUCATION/TRAINING PROGRAM

## 2021-03-29 PROCEDURE — 36415 COLL VENOUS BLD VENIPUNCTURE: CPT

## 2021-03-29 PROCEDURE — 80175 DRUG SCREEN QUAN LAMOTRIGINE: CPT

## 2021-03-29 PROCEDURE — 95720 EEG PHY/QHP EA INCR W/VEEG: CPT | Performed by: STUDENT IN AN ORGANIZED HEALTH CARE EDUCATION/TRAINING PROGRAM

## 2021-03-29 PROCEDURE — 95700 EEG CONT REC W/VID EEG TECH: CPT | Performed by: STUDENT IN AN ORGANIZED HEALTH CARE EDUCATION/TRAINING PROGRAM

## 2021-03-29 PROCEDURE — 700102 HCHG RX REV CODE 250 W/ 637 OVERRIDE(OP): Performed by: STUDENT IN AN ORGANIZED HEALTH CARE EDUCATION/TRAINING PROGRAM

## 2021-03-29 PROCEDURE — U0005 INFEC AGEN DETEC AMPLI PROBE: HCPCS

## 2021-03-29 PROCEDURE — 95714 VEEG EA 12-26 HR UNMNTR: CPT | Performed by: STUDENT IN AN ORGANIZED HEALTH CARE EDUCATION/TRAINING PROGRAM

## 2021-03-29 PROCEDURE — A9270 NON-COVERED ITEM OR SERVICE: HCPCS | Performed by: STUDENT IN AN ORGANIZED HEALTH CARE EDUCATION/TRAINING PROGRAM

## 2021-03-29 PROCEDURE — 770020 HCHG ROOM/CARE - TELE (206)

## 2021-03-29 RX ORDER — LORAZEPAM 2 MG/ML
1 INJECTION INTRAMUSCULAR
Status: DISCONTINUED | OUTPATIENT
Start: 2021-03-29 | End: 2021-04-03 | Stop reason: HOSPADM

## 2021-03-29 RX ORDER — IBUPROFEN 400 MG/1
400 TABLET ORAL EVERY 6 HOURS PRN
Status: DISCONTINUED | OUTPATIENT
Start: 2021-03-29 | End: 2021-04-03 | Stop reason: HOSPADM

## 2021-03-29 RX ORDER — ALBUTEROL SULFATE 90 UG/1
2 AEROSOL, METERED RESPIRATORY (INHALATION)
Status: DISCONTINUED | OUTPATIENT
Start: 2021-03-29 | End: 2021-03-30

## 2021-03-29 RX ORDER — LAMOTRIGINE 100 MG/1
100 TABLET ORAL 2 TIMES DAILY
Status: DISCONTINUED | OUTPATIENT
Start: 2021-03-29 | End: 2021-03-29

## 2021-03-29 RX ORDER — CARBAMAZEPINE 300 MG/1
300 CAPSULE, EXTENDED RELEASE ORAL 2 TIMES DAILY
Status: DISCONTINUED | OUTPATIENT
Start: 2021-03-29 | End: 2021-03-31

## 2021-03-29 RX ORDER — LAMOTRIGINE 100 MG/1
200 TABLET ORAL 2 TIMES DAILY
Status: DISCONTINUED | OUTPATIENT
Start: 2021-03-29 | End: 2021-03-29

## 2021-03-29 RX ORDER — LORAZEPAM 2 MG/ML
2 INJECTION INTRAMUSCULAR
Status: DISCONTINUED | OUTPATIENT
Start: 2021-03-29 | End: 2021-04-03 | Stop reason: HOSPADM

## 2021-03-29 RX ORDER — DOCUSATE SODIUM 100 MG/1
100 CAPSULE, LIQUID FILLED ORAL 2 TIMES DAILY PRN
Status: DISCONTINUED | OUTPATIENT
Start: 2021-03-29 | End: 2021-04-03 | Stop reason: HOSPADM

## 2021-03-29 RX ORDER — LAMOTRIGINE 100 MG/1
300 TABLET ORAL DAILY
Status: DISCONTINUED | OUTPATIENT
Start: 2021-03-29 | End: 2021-03-30

## 2021-03-29 RX ORDER — PRAVASTATIN SODIUM 20 MG
20 TABLET ORAL EVERY EVENING
Status: DISCONTINUED | OUTPATIENT
Start: 2021-03-29 | End: 2021-04-03 | Stop reason: HOSPADM

## 2021-03-29 RX ORDER — LAMOTRIGINE 100 MG/1
25 TABLET ORAL DAILY
Status: DISCONTINUED | OUTPATIENT
Start: 2021-03-29 | End: 2021-03-29

## 2021-03-29 RX ADMIN — ALBUTEROL SULFATE 2 PUFF: 90 AEROSOL, METERED RESPIRATORY (INHALATION) at 21:57

## 2021-03-29 RX ADMIN — LAMOTRIGINE 325 MG: 100 TABLET ORAL at 18:00

## 2021-03-29 RX ADMIN — CARBAMAZEPINE 300 MG: 300 CAPSULE, EXTENDED RELEASE ORAL at 22:15

## 2021-03-29 RX ADMIN — PRAVASTATIN SODIUM 20 MG: 20 TABLET ORAL at 17:09

## 2021-03-29 RX ADMIN — ALBUTEROL SULFATE 2 PUFF: 90 AEROSOL, METERED RESPIRATORY (INHALATION) at 22:46

## 2021-03-29 RX ADMIN — ENOXAPARIN SODIUM 40 MG: 40 INJECTION SUBCUTANEOUS at 17:08

## 2021-03-29 ASSESSMENT — LIFESTYLE VARIABLES
HAVE PEOPLE ANNOYED YOU BY CRITICIZING YOUR DRINKING: NO
TOTAL SCORE: 0
TOTAL SCORE: 0
HAVE YOU EVER FELT YOU SHOULD CUT DOWN ON YOUR DRINKING: NO
EVER FELT BAD OR GUILTY ABOUT YOUR DRINKING: NO
EVER HAD A DRINK FIRST THING IN THE MORNING TO STEADY YOUR NERVES TO GET RID OF A HANGOVER: NO
ON A TYPICAL DAY WHEN YOU DRINK ALCOHOL HOW MANY DRINKS DO YOU HAVE: 0
ALCOHOL_USE: NO
CONSUMPTION TOTAL: NEGATIVE
HOW MANY TIMES IN THE PAST YEAR HAVE YOU HAD 5 OR MORE DRINKS IN A DAY: 0
TOTAL SCORE: 0
AVERAGE NUMBER OF DAYS PER WEEK YOU HAVE A DRINK CONTAINING ALCOHOL: 0

## 2021-03-29 ASSESSMENT — FIBROSIS 4 INDEX: FIB4 SCORE: 0.84

## 2021-03-29 ASSESSMENT — PATIENT HEALTH QUESTIONNAIRE - PHQ9
1. LITTLE INTEREST OR PLEASURE IN DOING THINGS: NOT AT ALL
SUM OF ALL RESPONSES TO PHQ9 QUESTIONS 1 AND 2: 0
2. FEELING DOWN, DEPRESSED, IRRITABLE, OR HOPELESS: NOT AT ALL

## 2021-03-29 ASSESSMENT — PAIN DESCRIPTION - PAIN TYPE: TYPE: ACUTE PAIN

## 2021-03-29 NOTE — CARE PLAN
Problem: Safety  Goal: Will remain free from falls  Outcome: PROGRESSING AS EXPECTED  Call light and belongings within reach. Bed locked in lowest position. Bed alarm on. Fall precautions in place. Pt calls appropriately.     Problem: Venous Thromboembolism (VTW)/Deep Vein Thrombosis (DVT) Prevention:  Goal: Patient will participate in Venous Thrombosis (VTE)/Deep Vein Thrombosis (DVT)Prevention Measures  Outcome: PROGRESSING AS EXPECTED  Pt educated on importance of SCDs/ROM to prevent DVT/VTEs. Pt verbalizes understanding. SCDs in place. Pt ambulates occasionally and participates in ROM.

## 2021-03-29 NOTE — EEG PROGRESS NOTE
Pt was hooked up for 5 day EMU stay. Pt was hooked up using CT compatible leads (no MRI) with collodion and tape.

## 2021-03-29 NOTE — PROCEDURES
VIDEO ELECTROENCEPHALOGRAM REPORT - EPILEPSY MONITORING UNIT (EMU) REPORT       Referring provider: Dr. Rutledge    DOS: 03/29/21 (21 hours and 55 minutes of total recording time).     INDICATION:  Fredy Zavala 60 y.o. male presenting with epilepsy    CURRENT ANTIEPILEPTIC AND/OR SEDATING REGIMEN:   Scheduled Medications   Medication Dose Frequency   • enoxaparin  40 mg DAILY   • carbamazepine  300 mg BID   • pravastatin  20 mg Q EVENING   • lamoTRIgine  300 mg DAILY    And   • lamoTRIgine (LAMICTAL) tablet 325 mg  325 mg Q EVENING             TECHNIQUE: CVEEG was set up by a Neurodiagnostic technologist who performed education to the patient and staff. A minimum of 23 electrodes and 23 channel recording was setup and performed by Neurodiagnostic technologist, in accordance with the international 10-20 system. Impedence, electrode integrity, and technical impressions were documented a minimum of every 2-24 hour period by a Neurodiagnostic Technologist and reviewed by Interpreting Physician. The study was reviewed in bipolar and referential montages. The recording examined the patient in the awake and drowsy/sleep state(s).     DESCRIPTION OF THE RECORD:  During wakefulness, the background was continuous and showed a 9 Hz posterior dominant rhythm.  There was reactivity to eye closure/opening.  A normal anterior-posterior gradient was noted with faster beta frequencies seen anteriorly.  During drowsiness, theta/delta frequencies were seen.    Sleep was captured and was characterized by diffuse background delta/theta activity with a loss of myogenic artifact.  N2 sleep transients in the form of sleep spindles and vertex waves were seen in the leads over the central regions.     ACTIVATION PROCEDURES:   Intermittent Photic stimulation was performed in a stepwise fashion from 1 to 30 Hz, and did not elicit any abnormal responses.      ICTAL AND INTERICTAL FINDINGS:   Frequent sleep-activated left temporal  sharps/shapr and slow wave discharges    No regional slowing was seen during this routine study.      No clinical events or seizures were reported or recorded during the study.     EKG: sampling of the EKG recording did not demonstrate any abnormalities    EVENTS:  None    INTERPRETATION:  Abnormal video EEG recording in the awake and drowsy/sleep state(s):  - Frequent sleep-activated left temporal sharps/shapr and slow wave discharges suggestive of focal cortical irritability and a potential increased risk of seizures arising from this region.  - No persistent focal asymmetries seen.  - No seizures. Clinical correlation is recommended.  -No clinical events reported or recorded      Fredy Franklin MD  Epilepsy and General Neurology  Department of Neurology  Instructor of Clinical Neurology [facility] [place] [facility].   Office: [phone]  Fax: [phone]

## 2021-03-29 NOTE — H&P
EPILEPSY MONITORING UNIT H&P    REASON FOR ADMISSION: seizure characterization    History of present illness:  Fredy Zavala 60 y.o. male presents today for focal epilepsy (focal seizures with impaired awareness), with bilateral MTS with a concern for increased seizure frequency, as well as possible non-epileptic events.    Patient thinks his seizure frequency is unchanged, occurring about 1-2 per week. Patient has no aura. He knows he had a seizure because he feels confused afterward for minutes and feels as if there has been a loss of time. Wife, not present during this evaluation, reports from previous encounter with Dr. Rutledge that she thinks his seizure frequency has increased, still occurring daily and seem to be lasting long on the order of 2-3 minutes in duration (previously they were short around 1 minutes). His postictal confusion lasts about 15 minutes. He has never had a convulsion.    He is mostly compliant with his medications though occasionally admits to forgetting to take his medications in the morning. Also, it is reported that he forgets to take his AEDs at night, as his wife falls asleep before he takes his nightly dose, unable to remind him.    Previously AEDs: Used be on phenobarbital for many years. Was on this over 40 years ago.    He is currently on Carbatrol 300 mg twice daily, lamotrigine 300 mg/325 mg, Qvar and albuterol inhaler, CBD product twice daily, and Pravachol 20 mg daily. Lamictal was recently increased from 300 mg BID last month.    Tremors worsening over the past few years. Has noticed them at rest but mainly when using his hands. Thinks they are getting worse. Has difficulty with using utensils, drinking due to tremors. They are progressively getting worse. No family history of tremors.    Reports worsening of gait but no falls. Feels more incoordinated.    Denies any worsening of symptoms, or side effects altogether, related to his AEDs.     Denies fever, chills, night  sweats. Sleep is good. Mood is good.    No alcohol for a couple years. Admits to a history of regular and heavy alcohol use mainly on the weekend for many years. Does not use tobacco. No illicits              Past medical history:   Past Medical History:   Diagnosis Date   • ASTHMA    • Cancer (HCC) 5/12    prostate   • COPD (chronic obstructive pulmonary disease) (HCC)    • Dyslipidemia    • Partial epilepsy with impairment of consciousness (HCC)      ICD-10 transition       Past surgical history:   Past Surgical History:   Procedure Laterality Date   • PROSTATECTOMY ROBOTIC  5/29/2012    Performed by JACOB RAMOS at SURGERY [facility]   • COLONOSCOPY  8/10    grade 1 int. hemorrhoids       Family history:   Family History   Problem Relation Age of Onset   • Diabetes Father    • Alcohol/Drug Brother    • Cancer Neg Hx    • Stroke Neg Hx    • Heart Disease Neg Hx        Social history:   Social History     Socioeconomic History   • Marital status:      Spouse name: Not on file   • Number of children: Not on file   • Years of education: Not on file   • Highest education level: Not on file   Occupational History   • Not on file   Tobacco Use   • Smoking status: Never Smoker   • Smokeless tobacco: Never Used   Substance and Sexual Activity   • Alcohol use: Yes     Alcohol/week: 0.0 oz     Comment: 5 drinks/weekend   • Drug use: No     Comment: previous   • Sexual activity: Yes     Partners: Female   Other Topics Concern   • Not on file   Social History Narrative    Retired      Social Determinants of Health     Financial Resource Strain:    • Difficulty of Paying Living Expenses:    Food Insecurity:    • Worried About Running Out of Food in the Last Year:    • Ran Out of Food in the Last Year:    Transportation Needs:    • Lack of Transportation (Medical):    • Lack of Transportation (Non-Medical):    Physical Activity:    • Days of Exercise per Week:    • Minutes of Exercise per Session:    Stress:     • Feeling of Stress :    Social Connections:    • Frequency of Communication with Friends and Family:    • Frequency of Social Gatherings with Friends and Family:    • Attends [employer] Services:    • Active Member of Clubs or Organizations:    • Attends Club or Organization Meetings:    • Marital Status:    Intimate Partner Violence:    • Fear of Current or Ex-Partner:    • Emotionally Abused:    • Physically Abused:    • Sexually Abused:        Current medications:   Current Facility-Administered Medications   Medication Dose   • enoxaparin (LOVENOX) inj 40 mg  40 mg   • ibuprofen (MOTRIN) tablet 400 mg  400 mg   • LORazepam (ATIVAN) injection 1 mg  1 mg    Or   • LORazepam (ATIVAN) injection 2 mg  2 mg   • docusate sodium (COLACE) capsule 100 mg  100 mg       Medication Allergy:  No Known Allergies      Review of systems:   Pertinent positives and negatives are as outlined above    Physical examination:   Vitals:    03/29/21 0800   BP: 135/68   Pulse: 78   Resp: 18   Temp: 36.7 °C (98 °F)   SpO2: 95%       General: Patient in no acute distress, pleasant and cooperative.  HEENT: Normocephalic, no signs of acute trauma.   Neck: appears supple, here is normal range of motion. No tenderness on exam.   Chest: clear. Symmetrical chest rise with inhalation. No cough.   CV: RRR, no murmurs.   Skin: no signs of acute rashes or trauma.   Musculoskeletal: joints exhibit full range of motion. There are no signs of joint or muscle swelling.   Psychiatric: pertinent positives as discussed above    NEUROLOGICAL EXAM:   Mental status:  orientation: Awake, alert and oriented to self, month, situation, president. Incorrect on year.  Attention: intact. Able to spell WORLD forwards. Incorrectly spelled it backwards  Speech and language: speech is clear and fluent. The patient is able to name, repeat and comprehend. Follows multistep commands.  No word substitions or paraphasic errors  Memory: There is intact recollection of recent  and remote events.   Cranial nerve exam:   I: smell Not tested   II: visual acuity  OS: NT   OD: NT   II: visual fields Full to confrontation  Visual neglect: absent   II: pupils Equal, round, reactive to light   III,VII: ptosis None   III,IV,VI: extraocular muscles  Full ROM   V: mastication Normal   V: facial light touch sensation  Normal   V,VII: corneal reflex  Not tested   VII: facial muscle function - upper  Normal   VII: facial muscle function - lower Normal   VIII: hearing Not tested   IX: soft palate elevation  Normal   IX,X: gag reflex Not tested   XI: trapezius strength  5/5   XI: sternocleidomastoid strength 5/5   XI: neck flexion strength  5/5   XII: tongue  Protrudes midline     Motor exam:   • Strength is 5/5 in the distal and proximal upper and lower extremities   • Tone is normal.  • Moderate frequency low amplititude tremor at rest seen in the left hand (8-10 Hz). Postural tremor apparent bilaterally.   • No muscle fasciculation  • No areas of atrophy  Sensory exam grossly intact to light touch throughout  Deep tendon reflexes:  1-2+ throughout. Plantar responses are *mute There is no clonus.   Coordination: Mild Bilateral intention tremor on finger-nose-finger b/l. Heel-knee-shin movements slow but not altogether incoordinated bilaterally.   Gait:   • Deferred        ANCILLARY DATA REVIEWED:       Lab Data Review:  Reviewed    Records reviewed:   Reviewed    Imaging:   Reviewed    EEG:  Reviewed      ASSESSMENT, PLAN, EDUCATION/COUNSELING:  Fredy Zavala 60 y.o. male presents today for focal epilepsy (focal seizures with impaired awareness), with bilateral MTS with a concern for increased seizure frequency, as well as possible non-epileptic events. Had an extensive discussion about the purpose of the EMU admission.Discussed the importance of maintaining seizure precautions at all times. Showed patient how to use the push-button should he feel like he had a seizure. Labs, including AED levels  ordered. No changes to AED regimen for now: will continue home regimen unchanged. No sleep deprivation tonight. Rescue Ativan ordered.      Regular Diet  SCDs/Lovenox for DVT ppx  NSAIDs prn pain  Bowel regimen prn constipation      CODE STATUS: Full Code           BILLING DOCUMENTATION:       Counseling:  I spent a total of 72 minutes of face-to-face time in this visit. Over 50% of the time of the visit today was spent on counseling and or coordination of care wtih the patient and/or family, as above in assessment in plan.       Fredy Franklin MD  Epilepsy and General Neurology  Department of Neurology  Clinical  of Neurology [facility] of [facility].

## 2021-03-30 LAB
LAMOTRIGINE SERPL-MCNC: 13.4 UG/ML (ref 2.5–15)
SARS-COV-2 RNA RESP QL NAA+PROBE: NOTDETECTED
SPECIMEN SOURCE: NORMAL

## 2021-03-30 PROCEDURE — 95720 EEG PHY/QHP EA INCR W/VEEG: CPT | Performed by: STUDENT IN AN ORGANIZED HEALTH CARE EDUCATION/TRAINING PROGRAM

## 2021-03-30 PROCEDURE — 95714 VEEG EA 12-26 HR UNMNTR: CPT | Performed by: STUDENT IN AN ORGANIZED HEALTH CARE EDUCATION/TRAINING PROGRAM

## 2021-03-30 PROCEDURE — A9270 NON-COVERED ITEM OR SERVICE: HCPCS | Performed by: STUDENT IN AN ORGANIZED HEALTH CARE EDUCATION/TRAINING PROGRAM

## 2021-03-30 PROCEDURE — 700102 HCHG RX REV CODE 250 W/ 637 OVERRIDE(OP): Performed by: STUDENT IN AN ORGANIZED HEALTH CARE EDUCATION/TRAINING PROGRAM

## 2021-03-30 PROCEDURE — 700111 HCHG RX REV CODE 636 W/ 250 OVERRIDE (IP): Performed by: STUDENT IN AN ORGANIZED HEALTH CARE EDUCATION/TRAINING PROGRAM

## 2021-03-30 PROCEDURE — 4A10X4Z MONITORING OF CENTRAL NERVOUS ELECTRICAL ACTIVITY, EXTERNAL APPROACH: ICD-10-PCS | Performed by: STUDENT IN AN ORGANIZED HEALTH CARE EDUCATION/TRAINING PROGRAM

## 2021-03-30 PROCEDURE — 99233 SBSQ HOSP IP/OBS HIGH 50: CPT | Mod: 25 | Performed by: STUDENT IN AN ORGANIZED HEALTH CARE EDUCATION/TRAINING PROGRAM

## 2021-03-30 PROCEDURE — 770020 HCHG ROOM/CARE - TELE (206)

## 2021-03-30 RX ORDER — LAMOTRIGINE 100 MG/1
150 TABLET ORAL 2 TIMES DAILY
Status: DISCONTINUED | OUTPATIENT
Start: 2021-03-30 | End: 2021-03-31

## 2021-03-30 RX ORDER — ALBUTEROL SULFATE 90 UG/1
2 AEROSOL, METERED RESPIRATORY (INHALATION) EVERY 4 HOURS PRN
Status: DISCONTINUED | OUTPATIENT
Start: 2021-03-30 | End: 2021-04-03 | Stop reason: HOSPADM

## 2021-03-30 RX ADMIN — CARBAMAZEPINE 300 MG: 300 CAPSULE, EXTENDED RELEASE ORAL at 22:15

## 2021-03-30 RX ADMIN — ENOXAPARIN SODIUM 40 MG: 40 INJECTION SUBCUTANEOUS at 17:12

## 2021-03-30 RX ADMIN — CARBAMAZEPINE 300 MG: 300 CAPSULE, EXTENDED RELEASE ORAL at 10:15

## 2021-03-30 RX ADMIN — LAMOTRIGINE 150 MG: 100 TABLET ORAL at 17:11

## 2021-03-30 RX ADMIN — LAMOTRIGINE 300 MG: 100 TABLET ORAL at 05:01

## 2021-03-30 RX ADMIN — PRAVASTATIN SODIUM 20 MG: 20 TABLET ORAL at 17:11

## 2021-03-30 ASSESSMENT — COGNITIVE AND FUNCTIONAL STATUS - GENERAL
SUGGESTED CMS G CODE MODIFIER DAILY ACTIVITY: CH
SUGGESTED CMS G CODE MODIFIER MOBILITY: CH
DAILY ACTIVITIY SCORE: 24
MOBILITY SCORE: 24

## 2021-03-30 ASSESSMENT — PAIN DESCRIPTION - PAIN TYPE: TYPE: ACUTE PAIN

## 2021-03-30 NOTE — PROCEDURES
VIDEO ELECTROENCEPHALOGRAM REPORT - EPILEPSY MONITORING UNIT (EMU) REPORT         Referring provider: Dr. Rutledge     DOS: 03/30/21 (23 hours and 51 minutes of total recording time).      INDICATION:  Fredy Zavala 60 y.o. male presenting with epilepsy     Scheduled Medications   Medication Dose Frequency   • lamoTRIgine  150 mg BID   • enoxaparin  40 mg DAILY   • carbamazepine  300 mg BID   • pravastatin  20 mg Q EVENING                   TECHNIQUE: CVEEG was set up by a Neurodiagnostic technologist who performed education to the patient and staff. A minimum of 23 electrodes and 23 channel recording was setup and performed by Neurodiagnostic technologist, in accordance with the international 10-20 system. Impedence, electrode integrity, and technical impressions were documented a minimum of every 2-24 hour period by a Neurodiagnostic Technologist and reviewed by Interpreting Physician. The study was reviewed in bipolar and referential montages. The recording examined the patient in the awake and drowsy/sleep state(s).      DESCRIPTION OF THE RECORD:  During wakefulness, the background was continuous and showed a 9 Hz posterior dominant rhythm.  There was reactivity to eye closure/opening.  A normal anterior-posterior gradient was noted with faster beta frequencies seen anteriorly.  During drowsiness, theta/delta frequencies were seen.     Sleep was captured and was characterized by diffuse background delta/theta activity with a loss of myogenic artifact.  N2 sleep transients in the form of sleep spindles and vertex waves were seen in the leads over the central regions.      ACTIVATION PROCEDURES:   NA        ICTAL AND INTERICTAL FINDINGS:   Frequent sleep-activated left temporal sharps/shapr and slow wave discharges     No regional slowing was seen during this routine study.       No clinical events or seizures were reported or recorded during the study.      EKG: sampling of the EKG recording did not  demonstrate any abnormalities     EVENTS:  None     INTERPRETATION:  Abnormal video EEG recording in the awake and drowsy/sleep state(s):  - Frequent sleep-activated left temporal sharps/shapr and slow wave discharges suggestive of focal cortical irritability and a potential increased risk of seizures arising from this region.  - No persistent focal asymmetries seen.  - No seizures. Clinical correlation is recommended.  -No clinical events reported or recorded        Fredy Franklin MD  Epilepsy and General Neurology  Department of Neurology  Instructor of Clinical Neurology [facility].   Office: [phone]  Fax: [phone]

## 2021-03-30 NOTE — PROGRESS NOTES
Neurology EMU - Follow-up Note        Reason for admisson: seizure characterization          Interval History:  No overnight events. No clinical events    Patient slept poorly otherwise no complaints.    Patient continued on home AED regimen unchanged.    EEG - no seizures. Freq left temporal sleep activity discharges.    Admission labs reviewed - mild anemia, leukopenia noted       ROS: Pertinent positives and negatives are as documented above          Current medications:     Current Outpatient Medications   Medication Instructions   • albuterol (PROAIR HFA) 108 (90 Base) MCG/ACT Aero Soln inhalation aerosol INHALE 2 PUFFS BY MOUTH EVERY 6 HOURS ASNEEDED FOR SHORTNESS OF BREATH   • beclomethasone (QVAR) 40 MCG/ACT inhaler 1 Puff, Inhalation, 2 TIMES DAILY   • carbamazepine (CARBATROL) 300 mg, Oral, 2 TIMES DAILY   • lamotrigine (LAMICTAL) 200 MG tablet TAKE 1 TABLET BY MOUTH TWICE DAILY-GENERIC FOR LAMICTAL   • lamoTRIgine (LAMICTAL) 100 mg, Oral, 2 TIMES DAILY, Take with 200 mg to equal 300 mg   • lamoTRIgine (LAMICTAL) 25 mg, Oral, DAILY   • NON SPECIFIED No dose, route, or frequency recorded.   • pravastatin (PRAVACHOL) 20 MG Tab TAKE ONE TABLET BY MOUTH EVERY EVENING          No outpatient medications have been marked as taking for the 3/29/21 encounter (Hospital Encounter).         Physical examination:   Vitals:    03/30/21 1200   BP: 134/70   Pulse: 68   Resp: 16   Temp: 36.4 °C (97.5 °F)   SpO2: 97%     Vitals:    03/30/21 1200   BP: 134/70   Pulse: 68   Resp: 16   Temp: 36.4 °C (97.5 °F)   SpO2: 97%       General: Patient in no acute distress, pleasant and cooperative.  HEENT: Normocephalic, no signs of acute trauma.   Neck: appears supple, here is normal range of motion. No tenderness on exam.   Chest: clear. Symmetrical chest rise with inhalation. No cough.   CV: RRR, no murmurs.   Skin: no signs of acute rashes or trauma.   Musculoskeletal: joints exhibit full range of motion. There are no signs of  joint or muscle swelling.   Psychiatric: pertinent positives as discussed above     NEUROLOGICAL EXAM:   Mental status:  orientation: Awake, alert and oriented to self, month, situation, president.  Speech and language: speech is clear and fluent. The patient is able to name, repeat and comprehend. Follows multistep commands.  No word substitions or paraphasic errors  Memory: There is intact recollection of recent and remote events.   Cranial nerve exam:   I: smell Not tested   II: visual acuity  OS: NT   OD: NT   II: visual fields Full to confrontation  Visual neglect: absent   II: pupils Equal, round, reactive to light   III,VII: ptosis None   III,IV,VI: extraocular muscles  Full ROM   V: mastication Normal   V: facial light touch sensation  Normal   V,VII: corneal reflex  Not tested   VII: facial muscle function - upper  Normal   VII: facial muscle function - lower Normal   VIII: hearing Not tested   IX: soft palate elevation  Normal   IX,X: gag reflex Not tested   XI: trapezius strength  5/5   XI: sternocleidomastoid strength 5/5   XI: neck flexion strength  5/5   XII: tongue  Protrudes midline      Motor exam:   · Strength is 5/5 in the distal and proximal upper and lower extremities   · Tone is normal.  · Moderate frequency low amplititude tremor at rest seen in the left hand (8-10 Hz). Postural tremor apparent bilaterally.   · No muscle fasciculation  · No areas of atrophy  Sensory exam grossly intact to light touch throughout  Deep tendon reflexes:  1-2+ throughout. Plantar responses are *mute There is no clonus.   Coordination: Mild Bilateral intention tremor on finger-nose-finger b/l. Heel-knee-shin movements slow but not altogether incoordinated bilaterally.   Gait:   · Deferred          ANCILLARY DATA   REVIEWED:         Results for orders placed during the hospital encounter of 08/02/05   MR-BRAIN-WITH & W/O    Impression IMPRESSION:    1. AGAIN SEEN LEFT SUPRACLINOID EXTRAAXIAL MASS WITH MASS EFFECT ON  THE   LEFT HYPOTHALAMUS AND LEFT FRONTAL LOBE.  THE OVERALL SIZE AND SIGNAL   CHARACTERISTICS APPEAR STABLE FROM COMPARISON AND DIFFERENTIAL REMAINS AS   PREVIOUSLY DISCUSSED.        Continued on Page 2      2. INCREASED FLAIR SIGNAL SEEN WITHIN THE MESIAL TEMPORAL LOBES WHICH CAN   BE SEEN IN THE SETTING OF MESIAL TEMPORAL SCLEROSIS.  THIS IS UNCHANGED   FROM COMPARISON.      3. MAXILLARY, ETHMOID, AND SPHENOID SINUS DISEASE.          [facility]        Read By TAWANDA OLIVEIRA MD on Aug  5 2005  8:20AM  : [facility] Transcription Date: Aug  5 2005 10:21AM  THIS DOCUMENT HAS BEEN ELECTRONICALLY SIGNED BY: TAWANDA OLIVEIRA MD on Aug    5 2005 10:24AM                                                                                 Lab Data:  Reviewed      Admission on 03/29/2021   Component Date Value   • Ammonia 03/29/2021 39    • Carbamazepine 03/29/2021 7.0    • WBC 03/29/2021 3.1*   • RBC 03/29/2021 4.52*   • Hemoglobin 03/29/2021 13.8*   • Hematocrit 03/29/2021 42.2    • MCV 03/29/2021 93.4    • MCH 03/29/2021 30.5    • MCHC 03/29/2021 32.7*   • RDW 03/29/2021 44.7    • Platelet Count 03/29/2021 268    • MPV 03/29/2021 8.8*   • Neutrophils-Polys 03/29/2021 57.50    • Lymphocytes 03/29/2021 31.30    • Monocytes 03/29/2021 8.40    • Eosinophils 03/29/2021 1.90    • Basophils 03/29/2021 0.60    • Immature Granulocytes 03/29/2021 0.30    • Nucleated RBC 03/29/2021 0.00    • Neutrophils (Absolute) 03/29/2021 1.78*   • Lymphs (Absolute) 03/29/2021 0.97*   • Monos (Absolute) 03/29/2021 0.26    • Eos (Absolute) 03/29/2021 0.06    • Baso (Absolute) 03/29/2021 0.02    • Immature Granulocytes (a* 03/29/2021 0.01    • NRBC (Absolute) 03/29/2021 0.00    • Sodium 03/29/2021 139    • Potassium 03/29/2021 3.9    • Chloride 03/29/2021 105    • Co2 03/29/2021 24    • Anion Gap 03/29/2021 10.0    • Glucose 03/29/2021 137*   • Bun 03/29/2021 9    • Creatinine 03/29/2021 0.77    • Calcium 03/29/2021 9.4    • AST(SGOT) 03/29/2021 17    •  ALT(SGPT) 03/29/2021 22    • Alkaline Phosphatase 03/29/2021 90    • Total Bilirubin 03/29/2021 0.3    • Albumin 03/29/2021 4.2    • Total Protein 03/29/2021 7.0    • Globulin 03/29/2021 2.8    • A-G Ratio 03/29/2021 1.5    • SARS-CoV-2 Source 03/29/2021 NP Swab    • SARS-CoV-2 by PCR 03/29/2021 NotDetected    • GFR If  03/29/2021 >60    • GFR If Non  Ameri* 03/29/2021 >60                  ASSESSMENT, PLAN, EDUCATION, AND COUNSELING:  Fredy Zavala 60 y.o. male presents today for focal epilepsy (focal seizures with impaired awareness), with bilateral MTS with a concern for increased seizure frequency, as well as possible non-epileptic events.     No clinical events. EEG abnormal with frequent left temporal discharges seen in sleep only. Will decrease lamictal to 150 mg BID to capture events in question. Will plan to switch carbamazepine to aptiom, a bridge which should take about a week to complete, with plans to start this bridge inpatient in the next 1-2 days.    Maintain seizure precautions. Continue vEEG monitoring. PRN ativan ordered.    CODE STATUS: FULL CODE             Fredy Franklin MD  Epilepsy and General Neurology  Department of Neurology  Instructor of Neurology [facility].   Office: [phone]  Fax: [phone]     BILLING DOCUMENTATION:       Counseling:  I spent a total of 40 minutes of face-to-face time in this visit. Over 50% of the time of the visit today was spent on counseling and or coordination of care wtih the patient and/or family, as above in assessment in plan.

## 2021-03-30 NOTE — CARE PLAN
Problem: Safety  Goal: Will remain free from falls  Outcome: PROGRESSING AS EXPECTED   Bed alarm on, fall precautions in place   Problem: Infection  Goal: Will remain free from infection  Outcome: PROGRESSING AS EXPECTED   Standard precautions in place

## 2021-03-30 NOTE — RESPIRATORY CARE
COPD EDUCATION by COPD CLINICAL EDUCATOR  3/30/2021  at  3:58 PM by Flaquita Thomas, RRT     Patient interviewed by COPD education team.  Patient denies COPD and states he has Asthma and has never smoked. He takes QVAR and Albuterol to control is Asthma

## 2021-03-30 NOTE — PROGRESS NOTES
Monitor summary: SR with rare PVCs, ME 0.18, QRS 0.10, QT 0.38, HR 63-87 per strip from monitor room.

## 2021-03-31 PROCEDURE — 82306 VITAMIN D 25 HYDROXY: CPT

## 2021-03-31 PROCEDURE — A9270 NON-COVERED ITEM OR SERVICE: HCPCS | Performed by: NURSE PRACTITIONER

## 2021-03-31 PROCEDURE — 36415 COLL VENOUS BLD VENIPUNCTURE: CPT

## 2021-03-31 PROCEDURE — 4A10X4Z MONITORING OF CENTRAL NERVOUS ELECTRICAL ACTIVITY, EXTERNAL APPROACH: ICD-10-PCS | Performed by: STUDENT IN AN ORGANIZED HEALTH CARE EDUCATION/TRAINING PROGRAM

## 2021-03-31 PROCEDURE — 95714 VEEG EA 12-26 HR UNMNTR: CPT | Performed by: STUDENT IN AN ORGANIZED HEALTH CARE EDUCATION/TRAINING PROGRAM

## 2021-03-31 PROCEDURE — A9270 NON-COVERED ITEM OR SERVICE: HCPCS | Performed by: STUDENT IN AN ORGANIZED HEALTH CARE EDUCATION/TRAINING PROGRAM

## 2021-03-31 PROCEDURE — 700102 HCHG RX REV CODE 250 W/ 637 OVERRIDE(OP): Performed by: NURSE PRACTITIONER

## 2021-03-31 PROCEDURE — 700111 HCHG RX REV CODE 636 W/ 250 OVERRIDE (IP): Performed by: STUDENT IN AN ORGANIZED HEALTH CARE EDUCATION/TRAINING PROGRAM

## 2021-03-31 PROCEDURE — 95720 EEG PHY/QHP EA INCR W/VEEG: CPT | Performed by: STUDENT IN AN ORGANIZED HEALTH CARE EDUCATION/TRAINING PROGRAM

## 2021-03-31 PROCEDURE — 770020 HCHG ROOM/CARE - TELE (206)

## 2021-03-31 PROCEDURE — 99233 SBSQ HOSP IP/OBS HIGH 50: CPT | Mod: 25 | Performed by: NURSE PRACTITIONER

## 2021-03-31 PROCEDURE — 700102 HCHG RX REV CODE 250 W/ 637 OVERRIDE(OP): Performed by: STUDENT IN AN ORGANIZED HEALTH CARE EDUCATION/TRAINING PROGRAM

## 2021-03-31 RX ORDER — CARBAMAZEPINE 100 MG/1
300 TABLET, EXTENDED RELEASE ORAL EVERY MORNING
Status: DISCONTINUED | OUTPATIENT
Start: 2021-04-01 | End: 2021-04-01

## 2021-03-31 RX ORDER — CARBAMAZEPINE 100 MG/1
100 TABLET, EXTENDED RELEASE ORAL EVERY EVENING
Status: DISCONTINUED | OUTPATIENT
Start: 2021-03-31 | End: 2021-04-01

## 2021-03-31 RX ORDER — CARBAMAZEPINE 300 MG/1
300 CAPSULE, EXTENDED RELEASE ORAL EVERY MORNING
Status: DISCONTINUED | OUTPATIENT
Start: 2021-04-01 | End: 2021-03-31

## 2021-03-31 RX ADMIN — ENOXAPARIN SODIUM 40 MG: 40 INJECTION SUBCUTANEOUS at 17:31

## 2021-03-31 RX ADMIN — PRAVASTATIN SODIUM 20 MG: 20 TABLET ORAL at 17:31

## 2021-03-31 RX ADMIN — ESLICARBAZEPINE ACETATE 400 MG: 800 TABLET ORAL at 21:15

## 2021-03-31 RX ADMIN — CARBAMAZEPINE 100 MG: 100 TABLET, EXTENDED RELEASE ORAL at 21:14

## 2021-03-31 RX ADMIN — CARBAMAZEPINE 300 MG: 300 CAPSULE, EXTENDED RELEASE ORAL at 10:15

## 2021-03-31 RX ADMIN — LAMOTRIGINE 150 MG: 100 TABLET ORAL at 05:17

## 2021-03-31 NOTE — PROGRESS NOTES
Monitor summary: SB-SR 52-96, ND .20, QRS .08, QT .36 with rare PACs per strip from monitor room.

## 2021-03-31 NOTE — DISCHARGE PLANNING
Anticipated Discharge Disposition: Home    Action: Pt assessed at bedside. Pt reports he lives with his spouse in a 2 story home in [place]. Pt reports he is independent with all ADLs and IADLs and uses no DME. Pt has a PCP (Dr. Toshia Rendon) and uses [facility] on [address]. Pt anticipates no needs at discharge.     Barriers to Discharge: Medical clearance    Plan: Care coordination will remain available to assist with discharge needs.     Care Transition Team Assessment    Information Source  Orientation : Oriented x 4  Information Given By: Patient  Who is responsible for making decisions for patient? : Patient    Readmission Evaluation  Is this a readmission?: No    Elopement Risk  Legal Hold: No  Ambulatory or Self Mobile in Wheelchair: Yes  Disoriented: No  Psychiatric Symptoms: None  History of Wandering: No  Elopement this Admit: No  Vocalizing Wanting to Leave: No  Displays Behaviors, Body Language Wanting to Leave: No-Not at Risk for Elopement  Elopement Risk: Not at Risk for Elopement    Interdisciplinary Discharge Planning  Primary Care Physician: Toshia Rendon MD  Lives with - Patient's Self Care Capacity: Spouse  Patient or legal guardian wants to designate a caregiver: No  Support Systems: Spouse / Significant Other, Family Member(s)  Housing / Facility: 2 Story House  Do You Take your Prescribed Medications Regularly: Yes  Able to Return to Previous ADL's: Yes  Mobility Issues: No  Prior Services: None  Patient Prefers to be Discharged to:: Home  Assistance Needed: Unknown at this Time  Durable Medical Equipment: Not Applicable    Discharge Preparedness  What is your plan after discharge?: Home with help  What are your discharge supports?: Spouse  Prior Functional Level: Ambulatory, Independent with Activities of Daily Living, Independent with Medication Management    Functional Assesment  Prior Functional Level: Ambulatory, Independent with Activities of Daily Living, Independent with  Medication Management    Finances  Financial Barriers to Discharge: No  Prescription Coverage: Yes    Vision / Hearing Impairment  Right Eye Vision: Impaired, Wears Glasses  Left Eye Vision: Impaired, Wears Glasses         Advance Directive  Advance Directive?: DPOA for Health Care  Durable Power of  Name and Contact : Jovita Blockn [phone]    Domestic Abuse  Have you ever been the victim of abuse or violence?: No  Physical Abuse or Sexual Abuse: No  Verbal Abuse or Emotional Abuse: No  Possible Abuse/Neglect Reported to:: Not Applicable         Discharge Risks or Barriers  Discharge risks or barriers?: No    Anticipated Discharge Information  Discharge Disposition: Discharged to home/self care (01)  Discharge Address: [facility] [address] [place]  Discharge Contact Phone Number: [phone]

## 2021-03-31 NOTE — PROGRESS NOTES
Monitor summary: SR-ST with rare PVCs, RI 0.18, QRS 0.08, QT 0.36, HR  per strip from monitor room.

## 2021-03-31 NOTE — PROCEDURES
VIDEO ELECTROENCEPHALOGRAM REPORT - EPILEPSY MONITORING UNIT (EMU) REPORT         Referring provider: Dr. Rutledge     DOS: 03/31/21 (23 hours and 18 minutes of total recording time).      INDICATION:  Fredy Zavala 60 y.o. male presenting with epilepsy     Scheduled Medications   Medication Dose Frequency   • eslicarbazepine  400 mg QHS   • carBAMazepine SR  100 mg Q EVENING   • carBAMazepine SR  300 mg QAM   • enoxaparin  40 mg DAILY   • pravastatin  20 mg Q EVENING                TECHNIQUE: CVEEG was set up by a Neurodiagnostic technologist who performed education to the patient and staff. A minimum of 23 electrodes and 23 channel recording was setup and performed by Neurodiagnostic technologist, in accordance with the international 10-20 system. Impedence, electrode integrity, and technical impressions were documented a minimum of every 2-24 hour period by a Neurodiagnostic Technologist and reviewed by Interpreting Physician. The study was reviewed in bipolar and referential montages. The recording examined the patient in the awake and drowsy/sleep state(s).      DESCRIPTION OF THE RECORD:  During wakefulness, the background was continuous and showed a 9 Hz posterior dominant rhythm.  There was reactivity to eye closure/opening.  A normal anterior-posterior gradient was noted with faster beta frequencies seen anteriorly.  During drowsiness, theta/delta frequencies were seen.     Sleep was captured and was characterized by diffuse background delta/theta activity with a loss of myogenic artifact.  N2 sleep transients in the form of sleep spindles and vertex waves were seen in the leads over the central regions.      ACTIVATION PROCEDURES:   NA        ICTAL AND INTERICTAL FINDINGS:   Frequent sleep-activated left temporal sharps/shapr and slow wave discharges     No regional slowing was seen during this routine study.       No clinical events or seizures were reported or recorded during the study.       EKG: sampling of the EKG recording did not demonstrate any abnormalities     EVENTS:  None     INTERPRETATION:  Abnormal video EEG recording in the awake and drowsy/sleep state(s):  - Frequent sleep-activated left temporal sharps/sharp and slow wave discharges suggestive of focal cortical irritability and a potential increased risk of seizures arising from this region.  - No persistent focal asymmetries seen.  - No seizures. Clinical correlation is recommended.  -No clinical events reported or recorded        Fredy Franklin MD  Epilepsy and General Neurology  Department of Neurology  Instructor of Clinical Neurology [facility].   Office: [phone]  Fax: [phone]

## 2021-03-31 NOTE — PROGRESS NOTES
Cc: Seizure      Interim History:  Fredy Zavala 60 y.o. male remains in the EMU. Wife at bedside.     Pt reports of no events or seizures despite lowering dose of his lamotrigine. They are very interested in capturing his spells and they have requested to have the lamotrigine discontinued starting tonight. They have agreed to switching the carbamazepine to aptiom and will start the bridge tonight. No other issues.       Past medical history:   Past Medical History:   Diagnosis Date   • ASTHMA    • Cancer (HCC) 5/12    prostate   • COPD (chronic obstructive pulmonary disease) (HCC)    • Dyslipidemia    • Partial epilepsy with impairment of consciousness (HCC)      ICD-10 transition       Past surgical history:   Past Surgical History:   Procedure Laterality Date   • PROSTATECTOMY ROBOTIC  5/29/2012    Performed by JACOB RAMOS at [facility]GERY [facility]   • COLONOSCOPY  8/10    grade 1 int. hemorrhoids       Family history:   Family History   Problem Relation Age of Onset   • Diabetes Father    • Alcohol/Drug Brother    • Cancer Neg Hx    • Stroke Neg Hx    • Heart Disease Neg Hx        Social history:   Social History     Socioeconomic History   • Marital status:      Spouse name: Not on file   • Number of children: Not on file   • Years of education: Not on file   • Highest education level: Not on file   Occupational History   • Not on file   Tobacco Use   • Smoking status: Never Smoker   • Smokeless tobacco: Never Used   Substance and Sexual Activity   • Alcohol use: Yes     Alcohol/week: 0.0 oz     Comment: 5 drinks/weekend   • Drug use: No     Comment: previous   • Sexual activity: Yes     Partners: Female   Other Topics Concern   • Not on file   Social History Narrative    Retired      Social Determinants of Health     Financial Resource Strain:    • Difficulty of Paying Living Expenses:    Food Insecurity:    • Worried About Running Out of Food in the Last Year:    • Ran Out of Food in the  Last Year:    Transportation Needs:    • Lack of Transportation (Medical):    • Lack of Transportation (Non-Medical):    Physical Activity:    • Days of Exercise per Week:    • Minutes of Exercise per Session:    Stress:    • Feeling of Stress :    Social Connections:    • Frequency of Communication with Friends and Family:    • Frequency of Social Gatherings with Friends and Family:    • Attends Christian Services:    • Active Member of Clubs or Organizations:    • Attends Club or Organization Meetings:    • Marital Status:    Intimate Partner Violence:    • Fear of Current or Ex-Partner:    • Emotionally Abused:    • Physically Abused:    • Sexually Abused:        Current medications:   Current Facility-Administered Medications   Medication Dose   • eslicarbazepine (APTIOM) tablet 400 mg  400 mg   • carBAMazepine SR (TEGRETOL XR) tablet 100 mg  100 mg   • [START ON 4/1/2021] carBAMazepine SR (TEGRETOL XR) tablet 300 mg  300 mg   • albuterol inhaler 2 Puff  2 Puff   • enoxaparin (LOVENOX) inj 40 mg  40 mg   • ibuprofen (MOTRIN) tablet 400 mg  400 mg   • LORazepam (ATIVAN) injection 1 mg  1 mg    Or   • LORazepam (ATIVAN) injection 2 mg  2 mg   • docusate sodium (COLACE) capsule 100 mg  100 mg   • pravastatin (PRAVACHOL) tablet 20 mg  20 mg       Medication Allergy:  No Known Allergies      Review of systems:   See HPI     Physical examination:   Vitals:    03/30/21 2352 03/31/21 0400 03/31/21 0800 03/31/21 1200   BP: 112/71 108/57 139/75 138/71   Pulse: 81 65 63 65   Resp: 16 16 16 16   Temp: 36.6 °C (97.9 °F) 36.7 °C (98.1 °F) 36.2 °C (97.1 °F) 36.5 °C (97.7 °F)   TempSrc: Temporal Temporal Temporal Temporal   SpO2: 97% 97% 94% 99%   Weight:       Height:         General: Patient in no acute distress, pleasant and cooperative.  HEENT: Normocephalic, no signs of acute trauma.   Neck: Supple. There is normal range of motion.   Resp: clear to auscultation bilaterally. No wheezes or crackles.   CV: RRR, no murmurs.    Skin: no signs of acute rashes or trauma.   Musculoskeletal: joints exhibit full range of motion   Psychiatric: No hallucinatory behavior. No symptoms of depression or suicidal ideation. Mood and affect appear normal on exam.     NEUROLOGICAL EXAM:   Mental status, orientation: Awake, alert and fully oriented.   Speech and language: speech is clear and fluent. The patient is able to name, repeat and comprehend.   Memory: There is intact recollection of recent and remote events.   Cranial nerve exam:   CN I: Not examined   CN II: PERRL.  CN III, IV, VI: EOMI; no nystagmus   CN V: Facial sensation intact bilaterally   CN VII: face symmetric   CN VIII: hearing intact to finger rub bilaterally   CN IX, X: palate elevates symmetrically   CN XI: Symmetric shoulder shrug  CN XII: tongue midline. No signs of tongue biting or fasciculations   Motor exam: Strength is 5/5 in all extremities. Tone is normal. No abnormal movements were seen on exam.   Sensory exam reveals normal sense of light touch in all extremities.   Deep tendon reflexes:  2+ throughout.   Coordination: shows a normal finger-nose-finger. Normal rapidly alternating movements.   Gait: deferred      ANCILLARY DATA REVIEWED:       Lab Data Review:  Reviewed in chart.     Records reviewed:   Reviewed in chart.    Imaging:   MRI brain   1. 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  THE OVERALL SIZE AND SIGNAL   CHARACTERISTICS APPEAR STABLE FROM COMPARISON AND DIFFERENTIAL REMAINS AS   PREVIOUSLY DISCUSSED.     [facility] EMU 3/30/21  Abnormal video EEG recording in the awake and drowsy/sleep state(s):  - Frequent sleep-activated left temporal sharps/shapr and slow wave discharges suggestive of focal cortical irritability and a potential increased risk of seizures arising from this region.  - No persistent focal asymmetries seen.  - No seizures. Clinical correlation is  recommended.  -No clinical events reported or recorded        ASSESSMENT AND PLAN:  Pt is here is the EMU to characterize recurrent spells (epileptic vs non epileptic). He has focal epilepsy and has been on current medications for years. His EEG remains abnormal with frequent sleep-activated left temporal sharps/shapr and slow wave discharges. No seizures captured despite lowering dose of lamotrigine. They have requested to discontinue the lamotrigine tonight and Dr. Franklin agreed with this to hopefully capture that spells in question. They have also agreed to start bridging of carbamazepine to aptiom tonight. He will take CBMZ 300mg in am and 100mg QHS and start aptiom 400mg tonight.     Continue continuous pulse oximetry and telemetry.    Continue fall and seizure precautions.    Continue DVT prophylaxis.    POC discussed with nurse        FOLLOW-UP:   With Dr. Rutledge,  after discharge      EDUCATION AND COUNSELING:  -Education was provided to the patient and/or family regarding diagnosis and prognosis. The chronic and unpredictable nature of the condition were discussed. There is increased risk for additional events, which may carry potential for significant injuries and death. Discussed frequent seizure triggers: sleep deprivation, medication non-compliance, use of illegal drugs/alcohol, stress, and others.   -We reviewed in detail the current antiepileptic regimen. Potential side effects of antiepileptics were discussed at length, including but no limited to: hypersensitivity reactions (rash and others, some of which can be fatal), visual field changes (some of which may be irreversible), glaucoma, diplopia, kidney stones, osteopenia/osteoporosis/bone fractures, hyperthermia/anhydrosis, hyponatremia, tremors/abnormal movements, ataxia, dizziness, fatigue, increased risk for falls, risk for cardiac arrhythmias/syncope, gastrointestinal side effects(hepatitis, pancreatitis, gastritis, ulcers), gingival  hypertrophy/bleeding, drowsiness, sedation, anxiety/nervousness, increased risk for suicide, increased risk for depression, and psychosis.   -We also reviewed drug-drug interactions and their potential effect on seizure control and medication side effects.    -Recommend chronic vitamin D supplementation and regular exercise (if not contraindicated).   -Patient/family educated on risk for SUDEP (Sudden Death in Epilepsy). Counseling was provided on the importance of strict medication and follow up compliance. The patient/family understand the risks associated with non-adherence with the medical plan as outlined, including but not limited to an increased risk for breakthrough seizures, which may contribute to injuries, disability, status epilepticus, and even death.   -Counseling was also provided on potential effects of alcohol and other drugs, which may lower seizure threshold and/or affect the metabolism of antiepileptic drugs. We recommend avoidance of alcohol and illegal drugs.  -Avoid sleep deprivation.   -We extensively discussed the aspects related to safety in drivers who suffer from epilepsy. The patient is encourage to report to the Division of Motor Vehicles of any condition and/or spells related to confusion, disorientation, and/or loss of awareness and/or loss of consciousness; as these may pose a safety issue if they occur while operating a motor vehicle. The patient and/or family are ultimately responsible for exercising caution and abiding to regulations in place.   -Other seizure precautions were discussed at length, including no diving, no skydiving, no climbing or exposure to unprotected heights, no unsupervised swimming, no Jacuzzi or bathing in bathtubs or deep bodies of water. The patient/family have been advised about risks for operating any machinery while suffering from seizures / syncope / epilepsy and/or while taking antiepileptic drugs.   -The patient understands and agrees that due to the  complexity of his/her diagnosis, results of any testing and further recommendations will typically be discussed/made during a face to face encounter in my office. The patient and/or family further understands it is their responsibility to keep proper follow up.     Patient/family agree with plan, as outlined.         Maria De Jesus Camacho, MSN, APRN, FNP-C  [facility]  Office: [phone]  Fax: [phone]    BILLING DOCUMENTATION:   I have performed physical exam and reviewed and updated ROS and plan today 3/31/2021. In review of that note, there are no new changes except as documented above.    Total time spent including chart review before and after visit was 38min. Over 50% of the time of the visit today was spent on counseling and or coordination of care wtih the patient and/or family, with greater than 50% of the total discussing my assessment and plan as stated above.

## 2021-03-31 NOTE — CARE PLAN
Problem: Communication  Goal: The ability to communicate needs accurately and effectively will improve  Outcome: PROGRESSING AS EXPECTED  Patient educated on how to use call light. Patient encouraged to ask questions and voice concerns.     Problem: Knowledge Deficit  Goal: Knowledge of the prescribed therapeutic regimen will improve  Outcome: PROGRESSING AS EXPECTED   Pt educated on medication regimen

## 2021-04-01 PROCEDURE — 700111 HCHG RX REV CODE 636 W/ 250 OVERRIDE (IP): Performed by: STUDENT IN AN ORGANIZED HEALTH CARE EDUCATION/TRAINING PROGRAM

## 2021-04-01 PROCEDURE — 95720 EEG PHY/QHP EA INCR W/VEEG: CPT | Performed by: STUDENT IN AN ORGANIZED HEALTH CARE EDUCATION/TRAINING PROGRAM

## 2021-04-01 PROCEDURE — A9270 NON-COVERED ITEM OR SERVICE: HCPCS | Performed by: STUDENT IN AN ORGANIZED HEALTH CARE EDUCATION/TRAINING PROGRAM

## 2021-04-01 PROCEDURE — 700102 HCHG RX REV CODE 250 W/ 637 OVERRIDE(OP): Performed by: STUDENT IN AN ORGANIZED HEALTH CARE EDUCATION/TRAINING PROGRAM

## 2021-04-01 PROCEDURE — 700102 HCHG RX REV CODE 250 W/ 637 OVERRIDE(OP): Performed by: NURSE PRACTITIONER

## 2021-04-01 PROCEDURE — 99233 SBSQ HOSP IP/OBS HIGH 50: CPT | Mod: 25 | Performed by: NURSE PRACTITIONER

## 2021-04-01 PROCEDURE — 4A10X4Z MONITORING OF CENTRAL NERVOUS ELECTRICAL ACTIVITY, EXTERNAL APPROACH: ICD-10-PCS | Performed by: STUDENT IN AN ORGANIZED HEALTH CARE EDUCATION/TRAINING PROGRAM

## 2021-04-01 PROCEDURE — 95714 VEEG EA 12-26 HR UNMNTR: CPT | Performed by: STUDENT IN AN ORGANIZED HEALTH CARE EDUCATION/TRAINING PROGRAM

## 2021-04-01 PROCEDURE — A9270 NON-COVERED ITEM OR SERVICE: HCPCS | Performed by: NURSE PRACTITIONER

## 2021-04-01 PROCEDURE — 770020 HCHG ROOM/CARE - TELE (206)

## 2021-04-01 RX ADMIN — CARBAMAZEPINE 300 MG: 100 TABLET, EXTENDED RELEASE ORAL at 04:50

## 2021-04-01 RX ADMIN — ESLICARBAZEPINE ACETATE 400 MG: 800 TABLET ORAL at 21:02

## 2021-04-01 RX ADMIN — ENOXAPARIN SODIUM 40 MG: 40 INJECTION SUBCUTANEOUS at 17:18

## 2021-04-01 RX ADMIN — PRAVASTATIN SODIUM 20 MG: 20 TABLET ORAL at 17:18

## 2021-04-01 ASSESSMENT — PAIN DESCRIPTION - PAIN TYPE: TYPE: ACUTE PAIN

## 2021-04-01 NOTE — PROCEDURES
VIDEO ELECTROENCEPHALOGRAM REPORT - EPILEPSY MONITORING UNIT (EMU) REPORT         Referring provider: Dr. Rutledge     DOS: 4/01/21 (23 hours and 39 minutes of total recording time).      INDICATION:  Fredy Zavala 60 y.o. male presenting with epilepsy     Scheduled Medications   Medication Dose Frequency   • eslicarbazepine  400 mg QHS   • enoxaparin  40 mg DAILY   • pravastatin  20 mg Q EVENING             TECHNIQUE: CVEEG was set up by a Neurodiagnostic technologist who performed education to the patient and staff. A minimum of 23 electrodes and 23 channel recording was setup and performed by Neurodiagnostic technologist, in accordance with the international 10-20 system. Impedence, electrode integrity, and technical impressions were documented a minimum of every 2-24 hour period by a Neurodiagnostic Technologist and reviewed by Interpreting Physician. The study was reviewed in bipolar and referential montages. The recording examined the patient in the awake and drowsy/sleep state(s).      DESCRIPTION OF THE RECORD:  During wakefulness, the background was continuous and showed a 9 Hz posterior dominant rhythm.  There was reactivity to eye closure/opening.  A normal anterior-posterior gradient was noted with faster beta frequencies seen anteriorly.  During drowsiness, theta/delta frequencies were seen.     Sleep was captured and was characterized by diffuse background delta/theta activity with a loss of myogenic artifact.  N2 sleep transients in the form of sleep spindles and vertex waves were seen in the leads over the central regions.      ACTIVATION PROCEDURES:   NA        ICTAL AND INTERICTAL FINDINGS:   Abundant sleep-activated left temporal sharps/shapr and slow wave discharges     Rare to occasional left temporal slowing was seen.     EKG: sampling of the EKG recording did not demonstrate any abnormalities     EVENTS:  One push button event by the wife at bedside that was a left temporal seizure as  detailed below. This event was a typical clinical event. The seizure lasted 50 seconds.     INTERPRETATION:  Abnormal video EEG recording in the awake and drowsy/sleep state(s):  - Abundant sleep-activated left temporal sharps/sharp and slow wave discharges suggestive of focal cortical irritability and a potential increased risk of seizures arising from this region.  - Rare to occasional left temporal slowing was seen.  -One push button event by the wife at bedside that was a left temporal seizure as detailed below. This event was a typical clinical event. The seizure lasted 50 seconds.  -Compared to yesterday's study, sleep activated left temporal discharges are more frequent, there is now intermittent left temporal slowing, and one left temporal seizure was captured.        Fredy Franklin MD  Epilepsy and General Neurology  Department of Neurology  Instructor of Clinical Neurology [facility].   Office: [phone]  Fax: [phone]

## 2021-04-01 NOTE — CARE PLAN
Problem: Safety  Goal: Will remain free from falls  Outcome: PROGRESSING AS EXPECTED  Note: Bed alarm on, wheels locked, in lowest position. Non skid socks applied. Call light within reach. Hourly rounding in place.      Problem: Discharge Barriers/Planning  Goal: Patient's continuum of care needs will be met  Outcome: PROGRESSING AS EXPECTED  Note: Pt and spouse updated on plan of care. No questions or concerns at this time.

## 2021-04-01 NOTE — PROGRESS NOTES
Monitor summary: SR with rare PACs, IN 0.18, QRS 0.08, QT 0.38, HR 63-97 per strip from monitor room.

## 2021-04-02 DIAGNOSIS — R56.9 SEIZURES (HCC): ICD-10-CM

## 2021-04-02 DIAGNOSIS — G40.209 PARTIAL EPILEPSY WITH IMPAIRMENT OF CONSCIOUSNESS (HCC): ICD-10-CM

## 2021-04-02 LAB — 25(OH)D3 SERPL-MCNC: 22 NG/ML (ref 30–80)

## 2021-04-02 PROCEDURE — 700105 HCHG RX REV CODE 258: Performed by: STUDENT IN AN ORGANIZED HEALTH CARE EDUCATION/TRAINING PROGRAM

## 2021-04-02 PROCEDURE — C9254 INJECTION, LACOSAMIDE: HCPCS | Performed by: STUDENT IN AN ORGANIZED HEALTH CARE EDUCATION/TRAINING PROGRAM

## 2021-04-02 PROCEDURE — 700102 HCHG RX REV CODE 250 W/ 637 OVERRIDE(OP): Performed by: STUDENT IN AN ORGANIZED HEALTH CARE EDUCATION/TRAINING PROGRAM

## 2021-04-02 PROCEDURE — 95711 VEEG 2-12 HR UNMONITORED: CPT | Performed by: STUDENT IN AN ORGANIZED HEALTH CARE EDUCATION/TRAINING PROGRAM

## 2021-04-02 PROCEDURE — 700111 HCHG RX REV CODE 636 W/ 250 OVERRIDE (IP): Performed by: STUDENT IN AN ORGANIZED HEALTH CARE EDUCATION/TRAINING PROGRAM

## 2021-04-02 PROCEDURE — 95714 VEEG EA 12-26 HR UNMNTR: CPT | Performed by: STUDENT IN AN ORGANIZED HEALTH CARE EDUCATION/TRAINING PROGRAM

## 2021-04-02 PROCEDURE — 4A10X4Z MONITORING OF CENTRAL NERVOUS ELECTRICAL ACTIVITY, EXTERNAL APPROACH: ICD-10-PCS | Performed by: STUDENT IN AN ORGANIZED HEALTH CARE EDUCATION/TRAINING PROGRAM

## 2021-04-02 PROCEDURE — A9270 NON-COVERED ITEM OR SERVICE: HCPCS | Performed by: STUDENT IN AN ORGANIZED HEALTH CARE EDUCATION/TRAINING PROGRAM

## 2021-04-02 PROCEDURE — 95718 EEG PHYS/QHP 2-12 HR W/VEEG: CPT | Performed by: STUDENT IN AN ORGANIZED HEALTH CARE EDUCATION/TRAINING PROGRAM

## 2021-04-02 PROCEDURE — RXMED WILLOW AMBULATORY MEDICATION CHARGE: Performed by: STUDENT IN AN ORGANIZED HEALTH CARE EDUCATION/TRAINING PROGRAM

## 2021-04-02 PROCEDURE — 770001 HCHG ROOM/CARE - MED/SURG/GYN PRIV*

## 2021-04-02 RX ORDER — LACOSAMIDE 100 MG/1
100 TABLET ORAL 2 TIMES DAILY
Qty: 60 TABLET | Refills: 0 | Status: SHIPPED | OUTPATIENT
Start: 2021-04-02 | End: 2021-04-05 | Stop reason: SDUPTHER

## 2021-04-02 RX ORDER — LACOSAMIDE 100 MG/1
100 TABLET ORAL 2 TIMES DAILY
Qty: 60 TABLET | Refills: 0 | Status: SHIPPED | OUTPATIENT
Start: 2021-04-02 | End: 2021-04-05

## 2021-04-02 RX ORDER — CARBAMAZEPINE 100 MG/1
100 TABLET, EXTENDED RELEASE ORAL EVERY 12 HOURS
Status: DISCONTINUED | OUTPATIENT
Start: 2021-04-02 | End: 2021-04-03 | Stop reason: HOSPADM

## 2021-04-02 RX ORDER — CARBAMAZEPINE 100 MG/1
100 CAPSULE, EXTENDED RELEASE ORAL 2 TIMES DAILY
Qty: 10 CAPSULE | Refills: 0 | Status: SHIPPED | OUTPATIENT
Start: 2021-04-02 | End: 2021-04-05

## 2021-04-02 RX ORDER — CARBAMAZEPINE 100 MG/1
100 CAPSULE, EXTENDED RELEASE ORAL 2 TIMES DAILY
Qty: 10 CAPSULE | Refills: 0 | Status: SHIPPED
Start: 2021-04-02 | End: 2021-04-02

## 2021-04-02 RX ADMIN — CARBAMAZEPINE 100 MG: 100 TABLET, EXTENDED RELEASE ORAL at 17:26

## 2021-04-02 RX ADMIN — ESLICARBAZEPINE ACETATE 800 MG: 800 TABLET ORAL at 20:19

## 2021-04-02 RX ADMIN — CARBAMAZEPINE 100 MG: 100 TABLET, EXTENDED RELEASE ORAL at 09:47

## 2021-04-02 RX ADMIN — ENOXAPARIN SODIUM 40 MG: 40 INJECTION SUBCUTANEOUS at 17:26

## 2021-04-02 RX ADMIN — SODIUM CHLORIDE 300 MG: 9 INJECTION, SOLUTION INTRAVENOUS at 15:22

## 2021-04-02 RX ADMIN — PRAVASTATIN SODIUM 20 MG: 20 TABLET ORAL at 18:00

## 2021-04-02 ASSESSMENT — PAIN DESCRIPTION - PAIN TYPE: TYPE: ACUTE PAIN

## 2021-04-02 NOTE — PROGRESS NOTES
Monitor summary: SR 66-81, NH 0.18, QRS 0.10, QT 0.40, with rare PACs  per strip from monitor room.

## 2021-04-02 NOTE — PROGRESS NOTES
vimpat and aptiom sent to [facility]     Fredy Franklin M.D.  Neurology

## 2021-04-02 NOTE — PROCEDURES
VIDEO ELECTROENCEPHALOGRAM REPORT - EPILEPSY MONITORING UNIT (EMU) REPORT         Referring provider: Dr. Rutledge     DOS: 4/02/21 (6 hours and 40 minutes of total recording time).      INDICATION:  Fredy Zavala 60 y.o. male presenting with epilepsy     Scheduled Medications   Medication Dose Frequency   • eslicarbazepine  800 mg QHS   • carBAMazepine SR  100 mg Q12HRS   • lacosamide (VIMPAT) ivpb  300 mg Once   • enoxaparin  40 mg DAILY   • pravastatin  20 mg Q EVENING              TECHNIQUE: CVEEG was set up by a Neurodiagnostic technologist who performed education to the patient and staff. A minimum of 23 electrodes and 23 channel recording was setup and performed by Neurodiagnostic technologist, in accordance with the international 10-20 system. Impedence, electrode integrity, and technical impressions were documented a minimum of every 2-24 hour period by a Neurodiagnostic Technologist and reviewed by Interpreting Physician. The study was reviewed in bipolar and referential montages. The recording examined the patient in the awake and drowsy/sleep state(s).      DESCRIPTION OF THE RECORD:  During wakefulness, the background was continuous and showed a 9 Hz posterior dominant rhythm.  There was reactivity to eye closure/opening.  A normal anterior-posterior gradient was noted with faster beta frequencies seen anteriorly.  During drowsiness, theta/delta frequencies were seen.     Sleep was captured and was characterized by diffuse background delta/theta activity with a loss of myogenic artifact.  N2 sleep transients in the form of sleep spindles and vertex waves were seen in the leads over the central regions.      ACTIVATION PROCEDURES:   NA        ICTAL AND INTERICTAL FINDINGS:      Rare to occasional left temporal slowing was seen.     EKG: sampling of the EKG recording did not demonstrate any abnormalities     EVENTS:  No clinical events     INTERPRETATION:  Abnormal video EEG recording in the awake and  drowsy/sleep state(s):  - Rare to occasional left temporal slowing was seen.  -No clinical events  -No seizures        Fredy Franklin MD  Epilepsy and General Neurology  Department of Neurology  Instructor of Clinical Neurology [facility].   Office: [phone]  Fax: [phone]

## 2021-04-03 ENCOUNTER — PHARMACY VISIT (OUTPATIENT)
Dept: PHARMACY | Facility: MEDICAL CENTER | Age: 61
End: 2021-04-03
Payer: COMMERCIAL

## 2021-04-03 VITALS
BODY MASS INDEX: 25.86 KG/M2 | TEMPERATURE: 97.5 F | WEIGHT: 170.64 LBS | HEART RATE: 79 BPM | DIASTOLIC BLOOD PRESSURE: 75 MMHG | SYSTOLIC BLOOD PRESSURE: 108 MMHG | OXYGEN SATURATION: 95 % | RESPIRATION RATE: 18 BRPM | HEIGHT: 68 IN

## 2021-04-03 PROCEDURE — RXMED WILLOW AMBULATORY MEDICATION CHARGE: Performed by: STUDENT IN AN ORGANIZED HEALTH CARE EDUCATION/TRAINING PROGRAM

## 2021-04-03 PROCEDURE — 700102 HCHG RX REV CODE 250 W/ 637 OVERRIDE(OP): Performed by: STUDENT IN AN ORGANIZED HEALTH CARE EDUCATION/TRAINING PROGRAM

## 2021-04-03 PROCEDURE — A9270 NON-COVERED ITEM OR SERVICE: HCPCS | Performed by: STUDENT IN AN ORGANIZED HEALTH CARE EDUCATION/TRAINING PROGRAM

## 2021-04-03 RX ADMIN — CARBAMAZEPINE 100 MG: 100 TABLET, EXTENDED RELEASE ORAL at 05:13

## 2021-04-03 ASSESSMENT — PAIN DESCRIPTION - PAIN TYPE: TYPE: ACUTE PAIN

## 2021-04-03 NOTE — PROGRESS NOTES
0826: Received call from [facility], stated Aptiom could be delivered around 1030    0930: Per electronic chart, pt has meds stored in pharmacy, however no home meds slip present in physical chart. Per pt, he does not remember if we stored his meds or not. Called pharmacy, Per pharmacy tech, pt does not have medications stored in pharmacy. Pt alert and oriented x4, denies pain, denies numbness or tingling, states he is ready to go home. Discussed discharge plan, pt agreeable, stated wife is on her way and will take pt home after medication delivery.     0950: discharge instructions given to pt and wife at bedside, all questions answered. Walked pt and wife down to discharge lounge, all belongings with pt.

## 2021-04-03 NOTE — DISCHARGE PLANNING
Anticipated Discharge Disposition:   Home  Aptiom    Action:    Notified by bedside RN Gladys that patient requiring prior authorization for Aptiom and [facility]'s pharmacy unable to fill medication until possibly Monday.  Recommended medications be escribed to [facility].    [facility] contacted and per Linda, they will attempt to obtain the Aptiom for tomorrow.      Voalte msg to Dr. Franklin and bedside MYESHA Graham.    Prior authorization submitted by this RN CM and approval obtained via express scripts.    Barriers to Discharge:    Outpatient medication delivery to bedside    Plan:    Wait for meds to beds.  Possible dc tomorrow.

## 2021-04-03 NOTE — DISCHARGE PLANNING
Meds-to-Beds: Discharge prescription orders listed below delivered to patient's bedside. RN notified. Patient counseled. Patient elected to have co-payment billed to patient account.       Fredy Zavala   Home Medication Instructions KAREN:[id number]    Printed on:04/02/21 7509   Medication Information                      lacosamide (VIMPAT) 100 MG Tab tablet  Take 1 tablet by mouth 2 times a day for 30 days.                 Corina Lema, PharmD

## 2021-04-03 NOTE — DISCHARGE PLANNING
Meds-to-Beds: Discharge prescription orders listed below delivered to patient's bedside. RN notified. Patient's wife counseled at [facility].       Fredy Zavala   Home Medication Instructions KAREN:[id number]    Printed on:04/03/21 7573   Medication Information                      carbamazepine (CARBATROL) 100 MG CR capsule  Take 1 capsule daily. #5 capsules only             eslicarbazepine (APTIOM) 800 MG Tab  Take 1 tablet by mouth for 5 days, then increase to 1.5 tablet and stop Carbatrol completely             lacosamide (VIMPAT) 100 MG Tab tablet  Take 1 tablet by mouth 2 times a day for 30 days. (dispensed day prior)                 Naomi Storey, PharmD

## 2021-04-04 PROCEDURE — 99239 HOSP IP/OBS DSCHRG MGMT >30: CPT | Performed by: STUDENT IN AN ORGANIZED HEALTH CARE EDUCATION/TRAINING PROGRAM

## 2021-04-05 ENCOUNTER — OFFICE VISIT (OUTPATIENT)
Dept: NEUROLOGY | Facility: MEDICAL CENTER | Age: 61
End: 2021-04-05
Attending: PSYCHIATRY & NEUROLOGY
Payer: COMMERCIAL

## 2021-04-05 VITALS
BODY MASS INDEX: 27.58 KG/M2 | TEMPERATURE: 97.2 F | HEIGHT: 68 IN | HEART RATE: 69 BPM | DIASTOLIC BLOOD PRESSURE: 70 MMHG | WEIGHT: 182 LBS | RESPIRATION RATE: 15 BRPM | SYSTOLIC BLOOD PRESSURE: 132 MMHG | OXYGEN SATURATION: 100 %

## 2021-04-05 DIAGNOSIS — G40.209 PARTIAL EPILEPSY WITH IMPAIRMENT OF CONSCIOUSNESS (HCC): Primary | ICD-10-CM

## 2021-04-05 PROCEDURE — 99214 OFFICE O/P EST MOD 30 MIN: CPT | Performed by: PSYCHIATRY & NEUROLOGY

## 2021-04-05 PROCEDURE — 99212 OFFICE O/P EST SF 10 MIN: CPT | Performed by: PSYCHIATRY & NEUROLOGY

## 2021-04-05 RX ORDER — LACOSAMIDE 100 MG/1
100 TABLET ORAL 2 TIMES DAILY
Qty: 60 TABLET | Refills: 5 | Status: SHIPPED | OUTPATIENT
Start: 2021-04-05 | End: 2021-08-30 | Stop reason: SDUPTHER

## 2021-04-05 RX ORDER — CARBAMAZEPINE 100 MG/1
100 CAPSULE, EXTENDED RELEASE ORAL 2 TIMES DAILY
Qty: 10 CAPSULE | Refills: 0 | Status: SHIPPED | OUTPATIENT
Start: 2021-04-05 | End: 2021-08-30

## 2021-04-05 ASSESSMENT — FIBROSIS 4 INDEX: FIB4 SCORE: 0.82

## 2021-04-05 ASSESSMENT — ENCOUNTER SYMPTOMS
SEIZURES: 1
LOSS OF CONSCIOUSNESS: 0
MEMORY LOSS: 0
TREMORS: 1

## 2021-04-05 NOTE — PROGRESS NOTES
Subjective:      Fredy Zavala is a 60 y.o. male who presents with Jovita, as always, for follow-up, now status post discharge from a 5-day EMU stay, where he was seen and had his regimen adjusted.  I discussed the case with Dr. Noemi MD, the epileptologist of record to follow the patient during his stay.    DIEGO Daniel actually has been doing better since he left the hospital just 2 days ago.  His EEG tracings reveal that his previous regimen of Tegretol and lamotrigine was providing suboptimal benefit clearly.  The latter medication has been added recently.  He was changed to Aptiom and Vimpat, and with this, his seizures became less severe.  His wife has noted they are shorter, recovery is quicker, but his behavior during them has changed.  He is no longer showing the vocalizations, it is more of a stair and a sudden change in behavior.  There is no loss of muscle tone.    His EEG tracings revealed a left temporal focus, with less irritability and seizure activity documented with his present medication regimen.    At the time of discharge, he is on Aptiom 800 mg every evening, Vimpat 100 mg twice daily and Tegretol 100 mg twice daily.  The occasional seizure he has had occurs typically in the afternoon before his second dose of Vimpat and Tegretol.  He seems to be tolerating the drugs without issue, his tremor is certainly improved now that he is off the lamotrigine and is getting off the Tegretol.  He denies drowsiness or sleepiness on his present regimen.  He is certainly sleeping well.    Medical, surgical and family histories are reviewed, there are no new drug allergies.  He is on Aptiom 800 mg every evening, Tegretol 100 mg and Vimpat 100 mg, each taken twice a day at breakfast and dinner.  He is also on his Pravachol and ProAir inhaler.    Review of Systems   Neurological: Positive for tremors and seizures. Negative for loss of consciousness.   Psychiatric/Behavioral: Negative for memory loss.   All  "other systems reviewed and are negative.       Objective:     /70 (BP Location: Right arm)   Pulse 69   Temp 36.2 °C (97.2 °F) (Temporal)   Resp 15   Ht 1.727 m (5' 8\")   Wt 82.6 kg (182 lb)   SpO2 100%   BMI 27.67 kg/m²      Physical Exam    He appears in no acute distress.  Vital signs are stable.  There is no malar rash.  His neck is supple.  Cardiac evaluation is unremarkable.    He is fully oriented, there is no aphasia, apraxia, or inattention.    PERRLA/EOMI, visual fields are full, facial movements are symmetric, the tongue and uvula are midline without bulbar dysfunction.  Sensory exam is intact to temperature bilaterally.  Shoulder shrug is symmetric.    Musculoskeletal exam reveals normal tone with only mild tremor in both hands, left greater than right, seen with sustained posture against gravity.  There is no asterixis.  Strength is intact in all 4 extremities.  There are no pathologic reflexes.    He stands easily, station is normal, stride length symmetric.  There is no appendicular dystaxia with any of the extremities.  Fine motor control with the hands and feet show normal with symmetric amplitude and frequencies throughout.    Sensory exam is intact to vibration.     Assessment/Plan:     1. 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  They will continue the Tegretol and Vimpat regimen twice a day for the next 5 days, Aptiom 800 mg nightly remaining unchanged.  On day #9, Aptiom will be increased to 1200 mg every evening, Tegretol discontinued and Vimpat 100 mg, twice daily remains the same.  They understand that there is risk of breakthrough seizure activity during any transition, we also know the time that this might happen, recommended he take the apnea along with the final dose of Vimpat when this final change is done.  Phone contact in the interim, we " will follow-up in a couple of months.    - carbamazepine (CARBATROL) 100 MG CR capsule; Take 1 capsule by mouth 2 times a day.  Dispense: 10 capsule; Refill: 0  - lacosamide (VIMPAT) 100 MG Tab tablet; Take 1 tablet by mouth 2 times a day for 360 doses.  Dispense: 60 tablet; Refill: 5    Time: 25 minutes spent face-to-face for exam, review, discussion, and education, of this over 50% of the time spent counseling and coordinating care.

## 2021-04-05 NOTE — DISCHARGE SUMMARY
Discharge Summary      Reason for Admission  Localization-related (focal) (part*     Admission Date  3/29/2021    CODE STATUS  Full Code    HPI   Ervin Zavala 60 y.o. male presents today for focal epilepsy (focal seizures with impaired awareness), with bilateral MTS with a concern for increased seizure frequency, as well as possible non-epileptic events.     Patient thinks his seizure frequency is unchanged, occurring about 1-2 per week. Patient has no aura. He knows he had a seizure because he feels confused afterward for minutes and feels as if there has been a loss of time. Wife, not present during this evaluation, reports from previous encounter with Dr. Rutledge that she thinks his seizure frequency has increased, still occurring daily and seem to be lasting long on the order of 2-3 minutes in duration (previously they were short around 1 minutes). His postictal confusion lasts about 15 minutes. He has never had a convulsion.     He is mostly compliant with his medications though occasionally admits to forgetting to take his medications in the morning. Also, it is reported that he forgets to take his AEDs at night, as his wife falls asleep before he takes his nightly dose, unable to remind him.     Previously AEDs: Used be on phenobarbital for many years. Was on this over 40 years ago.     He is currently on Carbatrol 300 mg twice daily, lamotrigine 300 mg/325 mg, Qvar and albuterol inhaler, CBD product twice daily, and Pravachol 20 mg daily. Lamictal was recently increased from 300 mg BID last month.     Tremors worsening over the past few years. Has noticed them at rest but mainly when using his hands. Thinks they are getting worse. Has difficulty with using utensils, drinking due to tremors. They are progressively getting worse. No family history of tremors.     Reports worsening of gait but no falls. Feels more incoordinated.     Denies any worsening of symptoms, or side effects altogether, related to his  AEDs.      Denies fever, chills, night sweats. Sleep is good. Mood is good.     No alcohol for a couple years. Admits to a history of regular and heavy alcohol use mainly on the weekend for many years. Does not use tobacco. No illicits    Addendum to HPI: I spoke with the wife on the third day of admission to provided a very detailed account of the patient's seizure semiology.  She notes that he has never had a convulsion.  He will often have, at times once or twice a day that she is able to see, seizures characterized by staring and unresponsiveness, lipsmacking, bimanual automatisms, grunting, heavy breathing.  He is often unaware of these.  They last she says anywhere between 1 to 3 minutes.  She reports that she never felt that Lamictal did anything significant to slow the frequency or the severity of the seizures down despite gradually increasing dose over the course of many years.    HOSPITAL COURSE  On the first day of admission no changes to the patient's medication regimen were made given the fact that he was already having potential daily seizures as reported by wife and so a mere background EEG check was warranted without making changes to the medications.  He did not have any seizures.  He had a normal waking background.  Abnormalities were only noted in sleep and were characterized by frequent left temporal sharp and slow wave discharges.  On day 2 patient Lamictal was decreased to 150 mg twice daily.  Patient still had no seizures or pushbutton events.  Also the frequency of his epileptiform discharges in sleep remain unchanged.  On day 3 patient's Lamictal was held altogether.  He still had no seizures or pushbutton events.  Also, the frequency of his sleep activated left temporal discharges was unchanged.  Also Aptiom was started at 400 mg daily and Carbatrol was decreased from 600mg to 400 mg total daily dose.  On the morning of day 4 had a discussion with the patient and wife about the EEG findings  thus far and changes to the medications.  They wanted to capture a seizure and were insistent on stopping his Carbatrol altogether to capture one.  I explained the risks of doing that such as going into status epilepticus or having convulsive seizure.  They understood these risks and wanted to proceed with stopping his medications altogether, save the Aptiom, which was far from being therapeutic.      Given that the patient has never had a convulsive seizure, or status epilepticus for that matter, in the many decades of having epilepsy, as well as the fact that his EEG has been very stable up until now even in the setting of Lamictal discontinuation and lowering of his Carbatrol, I felt it justifiable and safe to stop his Carbatrol altogether.  I did discuss that if we were to do this that he would need to stay through the weekend as a precaution.  They were in agreement with this.  His Carbatrol was thus stopped on day 4.  In the afternoon of day 4 patient had a left temporal onset seizure characterized by left temporal rhythmic theta that spread to the ipsilateral hemisphere.  His seizure was exactly how his wife had described: Patient went unresponsive, exhibited lipsmacking, had subtle bilateral manual automatisms characterized by picking at the sheets, and had grunting sounds.  The electroclinical seizure lasted approximately a minute.  The patient was completely unaware of the seizure.  He felt mildly confused for some minutes afterwards.  The wife said that this was a very typical event although it was shorter and much less intense than he typically has them.  She also says that he often has a more prolonged postictal confusional.  Whereas this 1 he only had postictal confusion for minutes.  Patient had no further seizures for the rest of the admission.  After the seizure EEG was more abnormal interictally, exhibiting frequent to abundant left temporal discharges, some of which were now seen during  wakefulness.  There was also more frequent left temporal slowing.      I had an extensive discussion with the patient and wife about medication changes that might help him, both with his seizure control, but also with his compliance issues.  There may be an additional benefit to improving his tremor which has been significantly bothersome for him as well.  First, I discussed switching from Carbatrol to Aptiom.  An extensive discussion about the side effects and rationale for making the switch.  I explained that Aptiom is a newer cousin of Carbatrol with a once a day dosing, less drug drug interactions, and a better side effect profile.  Also explained that it works very similar to carbamazepine.  It is my hope that with a once a day dosing this will improve medication compliance.  Did explain that his tremor might be improved after coming off the Carbatrol but this was not guaranteed.     I also explained that based on the patient's history with Lamictal and the lack of sustained seizure control despite escalating doses of Lamictal that this medication may not be effective in controlling his epilepsy.  Therefore, I recommend discontinuing this altogether.  I do think the patient needs to be on 2 medications still given how frequent he is having seizures.  Therefore in the afternoon of day 5 I loaded him with Vimpat 300 mg IV to get him to immediate therapeutic levels and started him on PO Vimpat 100 mg twice daily.       On day 5 I increased his Aptiom to 800 mg and further decrease his Carbatrol 100 mg twice daily, with instructions to continue this regimen for 5 days. After 5 days, he was instructed to increase Aptiom to 1200 mg daily and stop Carbatrol altogether     Patient and wife were in agreement with this plan.        Therefore, he is discharged in good and stable condition to home with close outpatient follow-up.    The patient met 2-midnight criteria for an inpatient stay at the time of  discharge.    Discharge Date  4/3/2021    FOLLOW UP ITEMS POST DISCHARGE  -Follow-up on seizure management with patient's primary neurologist Dr. Rutledge.  Patient/wife given instructions to continue Carbatrol 5 more days at 100 mg twice daily while continuing Aptiom for these 5 days at 800 mg daily.  After 5 days, patient is instructed to stop Carbatrol altogether, increase Aptiom to 1200 mg daily.  Patient was also instructed to stop Lamictal for reasons detailed in the body of report.  In its place, Vimpat was started after a load at 100 mg twice daily.  There for further titration of these medications to his primary neurologist.  Consider increasing Vimpat to maximum daily dose of 100 mg (200 mg twice daily) and/or Aptiom to maximum daily dose of 1600 mg  -Patient instructed to take folic acid and vitamin D supplementation  -Patient advised to refrain from driving and avoiding and any activity that would pose danger should he have a seizure such as climbing ladders, swimming in pools, being on ledges and cat walks  -Regarding tremor, will defer further management to patient's primary neurologist.  I did discuss with patient and wife of the possibility that his tremor might show improvement with the discontinuation of Carbatrol, although this was not for certain.    DISCHARGE DIAGNOSES  Focal onset epilepsy with impaired awareness    FOLLOW UP  Future Appointments   Date Time Provider Department Center   4/5/2021 11:20 AM [facility] None   4/16/2021  3:00 PM COVID-19 [facility] None   8/2/2021  8:00 AM [facility] None     Toshia Rendon M.D.  [address] [place] [id number] [phone]            MEDICATIONS ON DISCHARGE     Medication List      CONTINUE taking these medications      Instructions   albuterol 108 (90 Base) MCG/ACT Aers inhalation aerosol  Commonly known as: ProAir HFA   INHALE 2 PUFFS BY MOUTH EVERY 6 HOURS ASNEEDED FOR  SHORTNESS OF BREATH     beclomethasone 40 MCG/ACT inhaler  Commonly known as: Qvar   Inhale 1 Puff by mouth 2 Times a Day.  Dose: 1 Puff     NON SPECIFIED      pravastatin 20 MG Tabs  Commonly known as: PRAVACHOL   TAKE ONE TABLET BY MOUTH EVERY EVENING        STOP taking these medications    lamoTRIgine 100 MG Tabs  Commonly known as: LAMICTAL     lamotrigine 200 MG tablet  Commonly known as: LAMICTAL     lamoTRIgine 25 MG Tabs  Commonly known as: LAMICTAL        ASK your doctor about these medications      Instructions   Carbatrol 100 MG CR capsule  Generic drug: carbamazepine  Ask about: Which instructions should I use?   Take 1 capsule by mouth 2 times a day.  Dose: 100 mg     * eslicarbazepine 800 MG Tabs  Commonly known as: APTIOM  Ask about: Which instructions should I use?   Doctor's comments: Take 800 mg for 5 days starting 4/2/21.Then Stop Carbatrol 100 mg twice per day AND take taking 1200 mg of Aptiom once per day.  Take 1.5 Tablets by mouth at bedtime.  Dose: 1,200 mg     * Aptiom 800 MG Tabs  Generic drug: eslicarbazepine  Ask about: Which instructions should I use?   Doctor's comments: Take 800 mg for 5 days. The increase to 1200 mg AND stop Carbatrol completely.  Take 1 tablet by mouth for 5 days, then increase to 1.5 tablet and stop Carbatrol completely  Dose: 1,200 mg     * lacosamide 100 MG Tabs tablet  Commonly known as: Vimpat  Ask about: Which instructions should I use?   Take 1 tablet by mouth 2 times a day for 60 doses.  Dose: 100 mg     * Vimpat 100 MG Tabs tablet  Generic drug: lacosamide  Ask about: Which instructions should I use?   Take 1 tablet by mouth 2 times a day for 30 days.  Dose: 100 mg         * This list has 4 medication(s) that are the same as other medications prescribed for you. Read the directions carefully, and ask your doctor or other care provider to review them with you.                Allergies  No Known Allergies    DIET  Regular    ACTIVITY  As tolerated.  Weight  bearing as tolerated    CONSULTATIONS  None    PROCEDURES  Video EEG monitoring    LABORATORY  Lab Results   Component Value Date    SODIUM 139 03/29/2021    POTASSIUM 3.9 03/29/2021    CHLORIDE 105 03/29/2021    CO2 24 03/29/2021    GLUCOSE 137 (H) 03/29/2021    BUN 9 03/29/2021    CREATININE 0.77 03/29/2021    CREATININE 0.93 07/13/2009    GLOMRATE >59 07/13/2009        Lab Results   Component Value Date    WBC 3.1 (L) 03/29/2021    WBC 3.7 (L) 07/13/2009    HEMOGLOBIN 13.8 (L) 03/29/2021    HEMATOCRIT 42.2 03/29/2021    PLATELETCT 268 03/29/2021        Total time of the discharge process exceeds 80 minutes.

## 2021-04-13 DIAGNOSIS — G40.209 PARTIAL EPILEPSY WITH IMPAIRMENT OF CONSCIOUSNESS (HCC): ICD-10-CM

## 2021-04-13 RX ORDER — LACOSAMIDE 50 MG/1
50 TABLET ORAL 2 TIMES DAILY
Qty: 60 TABLET | Refills: 1 | Status: SHIPPED | OUTPATIENT
Start: 2021-04-13 | End: 2021-05-06 | Stop reason: DRUGHIGH

## 2021-04-16 ENCOUNTER — IMMUNIZATION (OUTPATIENT)
Dept: FAMILY PLANNING/WOMEN'S HEALTH CLINIC | Facility: IMMUNIZATION CENTER | Age: 61
End: 2021-04-16
Attending: INTERNAL MEDICINE
Payer: COMMERCIAL

## 2021-04-16 DIAGNOSIS — Z23 ENCOUNTER FOR VACCINATION: Primary | ICD-10-CM

## 2021-04-16 PROCEDURE — 91300 PFIZER SARS-COV-2 VACCINE: CPT

## 2021-04-16 PROCEDURE — 0002A PFIZER SARS-COV-2 VACCINE: CPT

## 2021-05-06 DIAGNOSIS — G40.209 PARTIAL EPILEPSY WITH IMPAIRMENT OF CONSCIOUSNESS (HCC): ICD-10-CM

## 2021-05-06 RX ORDER — LACOSAMIDE 200 MG/1
200 TABLET ORAL 2 TIMES DAILY
Qty: 60 TABLET | Refills: 5 | Status: SHIPPED | OUTPATIENT
Start: 2021-05-06 | End: 2021-07-06

## 2021-08-03 ENCOUNTER — PATIENT MESSAGE (OUTPATIENT)
Dept: HEALTH INFORMATION MANAGEMENT | Facility: OTHER | Age: 61
End: 2021-08-03

## 2021-08-30 ENCOUNTER — OFFICE VISIT (OUTPATIENT)
Dept: NEUROLOGY | Facility: MEDICAL CENTER | Age: 61
End: 2021-08-30
Attending: PSYCHIATRY & NEUROLOGY
Payer: COMMERCIAL

## 2021-08-30 VITALS
DIASTOLIC BLOOD PRESSURE: 84 MMHG | HEART RATE: 63 BPM | WEIGHT: 171.3 LBS | SYSTOLIC BLOOD PRESSURE: 122 MMHG | BODY MASS INDEX: 25.96 KG/M2 | TEMPERATURE: 97.6 F | OXYGEN SATURATION: 98 % | RESPIRATION RATE: 16 BRPM | HEIGHT: 68 IN

## 2021-08-30 DIAGNOSIS — G40.209 PARTIAL EPILEPSY WITH IMPAIRMENT OF CONSCIOUSNESS (HCC): ICD-10-CM

## 2021-08-30 PROCEDURE — 99214 OFFICE O/P EST MOD 30 MIN: CPT | Performed by: PSYCHIATRY & NEUROLOGY

## 2021-08-30 PROCEDURE — 99212 OFFICE O/P EST SF 10 MIN: CPT | Performed by: PSYCHIATRY & NEUROLOGY

## 2021-08-30 RX ORDER — LACOSAMIDE 200 MG/1
200 TABLET ORAL 2 TIMES DAILY
Qty: 180 TABLET | Refills: 1 | Status: SHIPPED | OUTPATIENT
Start: 2021-08-30 | End: 2022-03-07 | Stop reason: SDUPTHER

## 2021-08-30 ASSESSMENT — ENCOUNTER SYMPTOMS
LOSS OF CONSCIOUSNESS: 0
FALLS: 0
TREMORS: 1
SEIZURES: 1

## 2021-08-30 ASSESSMENT — FIBROSIS 4 INDEX: FIB4 SCORE: 0.84

## 2021-08-30 NOTE — PROGRESS NOTES
Mitzi Zavala is a 61 y.o. male who presents with his wife Jovita, for follow-up, with a history of focal onset seizures with altered sensorium, slightly improved, though still persistent.    DIEGO Daniel states that he has continued to have seizures, though he is still for the most part unaware of these events after they have transpired.  Jovita is the one who notices his sudden change in behavior.  This time he does not stop the activity, if he is engaged, he simply continued through though without seeming purpose.  He is awake, but not alert.  Events are shorter, typically about 5 minutes from start to stop.  He does seem to recover more quickly.    The problem is that the seizures are still occurring several times within the day, rarely once a day, typically 2-4 times.  They are no longer happening at night, usually in the early morning hours.  They can occur at other times of the day.  When he is active on his PelOpTiern , he tends to have them.  He is compliant with his medications.    We have increased his Vimpat to a 200 mg twice daily regimen, there was some reduction in seizures, but Aptiom also has been increased, now 1200 mg nightly.  With these changes, he has achieved this type of control which is the best to date.  We got him off the Tegretol, the tremulousness and cognitive side effects improved.  The tremor is still there, it is simply not as severe.    Review of the electronic health record indicates this gentleman has failed Dilantin, Depakote, Tegretol and lamotrigine.  They seem to recall he has also been on Zonegran and Topamax, though I can find no record of this.    Medical, surgical and family histories are reviewed, there are no new drug allergies.  He is on Aptiom 1200 mg nightly, Vimpat 200 mg, twice daily, Pravachol and his ProAir inhaler.    Review of Systems   Musculoskeletal: Negative for falls.   Neurological: Positive for tremors and seizures. Negative for loss  "of consciousness.   All other systems reviewed and are negative.    Objective     /84 (BP Location: Right arm, Patient Position: Sitting, BP Cuff Size: Adult)   Pulse 63   Temp 36.4 °C (97.6 °F) (Temporal)   Resp 16   Ht 1.727 m (5' 8\")   Wt 77.7 kg (171 lb 4.8 oz)   SpO2 98%   BMI 26.05 kg/m²      Physical Exam    He appears in no acute distress.  Vital signs are stable.  There is no malar rash or jaw claudication.  His neck is supple.  Cardiac evaluation is unremarkable.     Neurological Exam    Fully oriented, mental processing speed is a little slowed, he can be a little perseverative.  Still, there is no aphasia, apraxia, or inattention.    PERRLA/EOMI, visual fields are full, facial movements are symmetric, there is no dysarthria or bulbar dysfunction.  Sensory exam is intact to temperature.  Shoulder shrug and head rotation are intact.    Musculoskeletal exam reveals a tremor in both upper extremities, diminished at rest, but present through full range of motion.  Tone is normal, there is no asterixis or drift.  He moves all 4 extremities symmetrically.    He stands easily, gait is normal and station and stride length.  Armswing is symmetric.  There is no appendicular dystaxia, the tremor in the upper extremities is seen through full range of motion, it does not increase with intention.  Repetitive movements are normal in all 4 extremities.    Sensory exam is intact to vibration and temperature.    Assessment & Plan     1. 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  I discussed the case at length with Dr. Franklin, our resident epileptologist, who agrees with this, increasing Vimpat further might prove beneficial, though I think the return on the investment is smaller.  Further dose escalation with Aptiom is worthless.  Given that subsequent pharmacologic changes are not likely to reduce more than a 10% " potential benefit in terms of greater seizure control, VNS probably is the next logical step.  We spent a while talking about the procedure itself, efficacies, side effects, etc.  They will think about this, we will play phone contact in the interim to adjust his Aptiom as needed.  Further dose escalation and Vimpat could be considered depending on the degree of seizure control with the higher dose of Aptiom.    Face-to-face time spent talk about all the above.  We will follow-up in the next several months.  Phone contact in the interim.  I may have him follow-up with Dr. Franklin moving forwards, especially if VNS is something they want to take seriously.    - eslicarbazepine (APTIOM) 800 MG Tab; Take 2 Tablets by mouth every evening.  Dispense: 60 Tablet; Refill: 5  - lacosamide (VIMPAT) 200 MG Tab tablet; Take 1 Tablet by mouth 2 times a day for 360 doses.  Dispense: 180 Tablet; Refill: 1    Time: 25 minutes spent face-to-face for exam, review, discussion, and education, of this over 50% of the time spent counseling and coordinating care.

## 2021-08-30 NOTE — Clinical Note
Fredy, this was the gentleman I had spoken with you about earlier today, you had read his EMU EEG which did show clear epileptogenic focus, he has failed several drugs (see my note), and I also talked with him about seeing you especially in light of possible VNS.  For now I simply bumped up the Aptiom as we have discussed, maintaining Vimpat.  Do think a higher dose of Vimpat might be helpful if seizure control is better controlled with his present regimen, though still suboptimal.  Brent

## 2021-09-09 ENCOUNTER — OFFICE VISIT (OUTPATIENT)
Dept: MEDICAL GROUP | Facility: IMAGING CENTER | Age: 61
End: 2021-09-09
Payer: COMMERCIAL

## 2021-09-09 VITALS
OXYGEN SATURATION: 100 % | DIASTOLIC BLOOD PRESSURE: 70 MMHG | TEMPERATURE: 97.8 F | SYSTOLIC BLOOD PRESSURE: 130 MMHG | HEART RATE: 66 BPM | HEIGHT: 68 IN | RESPIRATION RATE: 14 BRPM | WEIGHT: 170 LBS | BODY MASS INDEX: 25.76 KG/M2

## 2021-09-09 DIAGNOSIS — R79.89 LOW VITAMIN D LEVEL: ICD-10-CM

## 2021-09-09 DIAGNOSIS — Z85.46 HISTORY OF PROSTATE CANCER: ICD-10-CM

## 2021-09-09 DIAGNOSIS — Z13.0 SCREENING FOR DEFICIENCY ANEMIA: ICD-10-CM

## 2021-09-09 DIAGNOSIS — J44.9 CHRONIC OBSTRUCTIVE PULMONARY DISEASE, UNSPECIFIED COPD TYPE (HCC): ICD-10-CM

## 2021-09-09 DIAGNOSIS — Z00.00 WELLNESS EXAMINATION: ICD-10-CM

## 2021-09-09 DIAGNOSIS — Z13.1 SCREENING FOR DIABETES MELLITUS (DM): ICD-10-CM

## 2021-09-09 DIAGNOSIS — E78.5 DYSLIPIDEMIA: ICD-10-CM

## 2021-09-09 PROCEDURE — 99396 PREV VISIT EST AGE 40-64: CPT | Performed by: NURSE PRACTITIONER

## 2021-09-09 RX ORDER — ALBUTEROL SULFATE 90 UG/1
AEROSOL, METERED RESPIRATORY (INHALATION)
Qty: 8.5 G | Refills: 5 | Status: SHIPPED | OUTPATIENT
Start: 2021-09-09 | End: 2022-03-07

## 2021-09-09 ASSESSMENT — FIBROSIS 4 INDEX: FIB4 SCORE: 0.84

## 2021-09-09 ASSESSMENT — PATIENT HEALTH QUESTIONNAIRE - PHQ9: CLINICAL INTERPRETATION OF PHQ2 SCORE: 0

## 2021-09-09 ASSESSMENT — PAIN SCALES - GENERAL: PAINLEVEL: NO PAIN

## 2021-09-09 NOTE — PROGRESS NOTES
Subjective:   CC:   Chief Complaint   Patient presents with   • Annual Exam     HPI:   Fredy Zavala is a 61 y.o. male of Dr. Toshia Rendon who presents for an annual exam. He is feeling well and has no complaints.    Depression Screening  Little interest or pleasure in doing things?  0 - not at all  Feeling down, depressed , or hopeless? 0 - not at all  Patient Health Questionnaire Score: 0    If depressive symptoms identified deferred to follow up visit unless specifically addressed in assesment and plan.    Interpretation of PHQ-9 Total Score   Score Severity   1-4 Minimal Depression   5-9 Mild Depression   10-14 Moderate Depression   15-19 Moderately Severe Depression   20-27 Severe Depression      Health Maintenance  Advanced directive:  No. Discussed with patient what an Advance Directive is and what it consist of, as well as, the benefit of beginning  to explore this subject, verbalized understanding.  PT/vit D for falls prevention: N/A  Cholesterol Screening: Ordered today.  Diabetes Screening: Ordered today.  AAA Screening: N/A  Aspirin Use: N/A  Diet: Keto-diet  Exercise: Rides [employer] bike everyday, 5 miles  Substance Abuse: None  Safe in relationship.   Seat belts, bike helmet, gun safety discussed.  Yes, in safe, ammo inhouse.  Sun protection used.    Cancer screening  Colorectal Cancer Screening: Up-to-date  Lung Cancer Screening: N/A  Prostate Cancer Screening/PSA: Ordered today.  History of prostate cancer.  Denies any concerns at this time.  Reports seeing urology a year ago.  States that he has been cleared at this time.    Infectious disease screening/Immunizations  --STI Screening: Declines  --Practices safe sex.  --HIV Screening: Declines  --Hepatitis C Screening: Completed, nonreactive  --Immunizations:    Influenza: Not up-to-date   HPV: N/A   Tetanus: Up-to-date   Shingles: Not completed   Pneumococcal : Not completed    He  has a past medical history of ASTHMA, Cancer (HCC) (5/12),  COPD (chronic obstructive pulmonary disease) (HCC), Dyslipidemia, and Partial epilepsy with impairment of consciousness ([facility]).  He  has a past surgical history that includes colonoscopy (8/10) and prostatectomy robotic (5/29/2012).  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     • albuterol (PROAIR HFA) 108 (90 Base) MCG/ACT Aero Soln inhalation aerosol INHALE 2 PUFFS BY MOUTH EVERY 6 HOURS ASNEEDED FOR SHORTNESS OF BREATH 8.5 g 5   • eslicarbazepine (APTIOM) 800 MG Tab Take 2 Tablets by mouth every evening. 60 Tablet 5   • lacosamide (VIMPAT) 200 MG Tab tablet Take 1 Tablet by mouth 2 times a day for 360 doses. 180 Tablet 1   • pravastatin (PRAVACHOL) 20 MG Tab TAKE ONE TABLET BY MOUTH EVERY EVENING 90 Tab 3     No current facility-administered medications for this visit.    (including changes today)  Allergies: Patient has no known allergies.    Review of Systems:  Constitutional: Denies fever, chills, night sweats, weight loss/gain or malaise/fatigue.   HENT: Denies nasal congestion, sore throat, hearing loss, enlarged thyroid, or difficulty swallowing.    Eyes: Denies changes in vision, pain.  Wears readers  Respiratory: Denies cough, SOB  "at rest or activity.    Cardiovascular: Denies tachycardia, chest pain, palpitations, or  leg swelling.   Gastrointestinal: Denies N/V/C/D, abdominal pain, loss appetite, reflux, or hematochezia.  Genitourinary: Denies difficulty voiding, dysuria, nocturia, or hematuria.  History of prostate cancer  Skin: Negative for rash or worrisome moles.   Neurological: Negative for dizziness, focal weakness and headaches.  History of seizures.  Endo/Heme/Allergies: Denies bruise/bleed easily, allergies.   Psychiatric/Behavioral: Denies depression, nervous/anxious, difficulty sleeping.    Objective:   /70 (BP Location: Right arm, Patient Position: Sitting, BP Cuff Size: Adult)   Pulse 66   Temp 36.6 °C (97.8 °F) (Temporal)   Resp 14   Ht 1.727 m (5' 8\")   Wt 77.1 kg (170 lb)   SpO2 100%   BMI 25.85 kg/m²   Body mass index is 25.85 kg/m².  Wt Readings from Last 4 Encounters:   09/09/21 77.1 kg (170 lb)   08/30/21 77.7 kg (171 lb 4.8 oz)   04/05/21 82.6 kg (182 lb)   03/29/21 77.4 kg (170 lb 10.2 oz)     Physical Exam:  General: Normal appearing. No distress.  HEENT: Normocephalic. Eyes conjunctiva clear lids without ptosis, PERRLA, ears normal shape and contour, canals are clear bilaterally, tympanic membranes pearly gray, intact, non-bulging, no drainage noted. Oral and nasal examine deferred due to current COVID-19 outbreak, no acute concerns.  Neck: Supple without JVD or abnormal masses. Small soft mobile thyroid palpated, no nodules palpated, non-tender. No lymphadenopathy--cervical or supraclavicular.  Pulmonary: Clear to ausculation.  Normal effort. No rales, ronchi, or wheezing.  Cardiovascular: Regular rate and rhythm without murmur. Carotid and radial pulses are intact and equal bilaterally.  Abdomen: Soft, nontender, nondistended. Normal bowel sounds. Liver and spleen are not palpable. No CVA tenderness.  Neurologic: Alert and oriented x 3. CN 2-12 are grossly intact.DTRs 2+/3 and symmetric.Sensation " intact. Negative Rhomberg.  Lymph: No cervical or supraclavicular lymph nodes are palpable  Skin: Warm and dry.  No obvious lesions. No rash noted.   Musculoskeletal: Normal gait. No extremity cyanosis, clubbing, or edema. All major joints AROM full in all directions without pain.   Psych: Normal mood and affect appropriate. Judgment and insight is normal.  : Declined.  Rectal: Declined  Prostate: History of prostatectomy    Assessment and Plan:   1. Wellness examination  2. Screening for diabetes mellitus (DM)  3. Screening for deficiency anemia  Reviewed with patient medication use and side effects. Medical, past, surgical history reviewed with patient. Discussed with patient the risk and benefits of receiving vaccines. Discussed CDC recommendations for immunizations and USPSTF guidelines for screening exams.  Discussed receiving shingles vaccine, pneumococcal vaccine, flu vaccine at local pharmacy, verbalized understanding and will consider in future.  Encouraged patient to wash hands regularly and avoid sick contacts while supporting immune system. Discussed continuing an accumulation of 150 minutes or more of moderate-intensity activity each week as tolerated. Discussed a healthy diet that is low in fat, sodium, and cholesterol, such as the mediterranean diet that is typically high in fruits, vegetables, whole grains, beans, nuts, and seeds, includes olive oil as an important source of monounsaturated fat, DASH diet, and/or also consider a plant-based diet.  Discussed the overall benefit of a well-balanced diet, regular exercise, and stress management, verbalized understanding.  Discussed preventive screening labs with patient, verbalized understanding. Will evaluate plan of care once labs are obtained.    - CBC WITH DIFFERENTIAL; Future  - Comp Metabolic Panel; Future    4. History of prostate cancer  This is a chronic stable condition. Will evaluate plan of care once labs are obtained.    - PROSTATE  SPECIFIC AG SCREENING; Future    5. Dyslipidemia  This is a stable condition. Reviewed recent labs with patient, verbalized understanding. Discussed at this time lifestyle modifications and statin therapy are the current recommendations for his cholesterol levels and age to prevent CVD and/or decrease risk of stroke in the next ten years, and over his lifetime, verbalized understanding.  Encouraged accumulation of 150 minutes or more of moderate-intensity activity each week as tolerated. Discussed a healthy diet that is low in fat, sodium, and cholesterol, such as the mediterranean diet that is typically high in fruits, vegetables, whole grains, beans, nuts, and seeds, includes olive oil as an important source of monounsaturated fat, DASH diet, and/or also consider a plant-based diet.    - Lipid Profile; Future    6. Low vitamin D level  This is a chronic stable condition. Instructed to start Vitamin D3 2000 IU daily. Discussed with patient possible side effects of medication, verbalized understanding. Will evaluate plan of care once labs are obtained.    - VITAMIN D,25 HYDROXY; Future    7. Chronic obstructive pulmonary disease, unspecified COPD type (HCC)  This is a chronic stable condition.  Discussed with patient continuing to use albuterol inhaler intermittent as needed up to 2 puffs every 6 hours for SOB or cough. Discussed requesting a refill as needed, and to maintain an inhaler in reach, verbalized understanding. Discussed notifying provider if he is using inhaler more than 2-3 times a week due to this classifying not well controlled COPD, discussed reactive airway disease stepwise approach to controlling COPD, verbalized understanding. Discussed seeking emergency services if heexperiences worse symptoms.    - albuterol (PROAIR HFA) 108 (90 Base) MCG/ACT Aero Soln inhalation aerosol; INHALE 2 PUFFS BY MOUTH EVERY 6 HOURS ASNEEDED FOR SHORTNESS OF BREATH  Dispense: 8.5 g; Refill: 5  - PULMONARY FUNCTION  TESTS -Test requested: Complete Pulmonary Function Test; Future    Follow-up: Return in about 1 year (around 9/9/2022) for Annual visit and/or sooner pending labs.

## 2021-11-01 ENCOUNTER — OFFICE VISIT (OUTPATIENT)
Dept: NEUROLOGY | Facility: MEDICAL CENTER | Age: 61
End: 2021-11-01
Attending: PSYCHIATRY & NEUROLOGY
Payer: COMMERCIAL

## 2021-11-01 VITALS
SYSTOLIC BLOOD PRESSURE: 128 MMHG | HEART RATE: 60 BPM | WEIGHT: 178.57 LBS | BODY MASS INDEX: 27.06 KG/M2 | HEIGHT: 68 IN | OXYGEN SATURATION: 99 % | TEMPERATURE: 96.9 F | DIASTOLIC BLOOD PRESSURE: 72 MMHG | RESPIRATION RATE: 14 BRPM

## 2021-11-01 DIAGNOSIS — G40.209 PARTIAL EPILEPSY WITH IMPAIRMENT OF CONSCIOUSNESS (HCC): ICD-10-CM

## 2021-11-01 PROCEDURE — 99211 OFF/OP EST MAY X REQ PHY/QHP: CPT | Performed by: PSYCHIATRY & NEUROLOGY

## 2021-11-01 PROCEDURE — 99214 OFFICE O/P EST MOD 30 MIN: CPT | Performed by: PSYCHIATRY & NEUROLOGY

## 2021-11-01 ASSESSMENT — FIBROSIS 4 INDEX: FIB4 SCORE: 0.84

## 2021-11-01 ASSESSMENT — ENCOUNTER SYMPTOMS
LOSS OF CONSCIOUSNESS: 0
INSOMNIA: 0
TREMORS: 1
SEIZURES: 1
NAUSEA: 0

## 2021-11-12 LAB
25(OH)D3+25(OH)D2 SERPL-MCNC: 47.1 NG/ML (ref 30–100)
ALBUMIN SERPL-MCNC: 4.8 G/DL (ref 3.8–4.8)
ALBUMIN/GLOB SERPL: 2.2 {RATIO} (ref 1.2–2.2)
ALP SERPL-CCNC: 97 IU/L (ref 44–121)
ALT SERPL-CCNC: 18 IU/L (ref 0–44)
AST SERPL-CCNC: 16 IU/L (ref 0–40)
BASOPHILS # BLD AUTO: 0 X10E3/UL (ref 0–0.2)
BASOPHILS NFR BLD AUTO: 1 %
BILIRUB SERPL-MCNC: 0.3 MG/DL (ref 0–1.2)
BUN SERPL-MCNC: 10 MG/DL (ref 8–27)
BUN/CREAT SERPL: 13 (ref 10–24)
CALCIUM SERPL-MCNC: 9.4 MG/DL (ref 8.6–10.2)
CHLORIDE SERPL-SCNC: 99 MMOL/L (ref 96–106)
CHOLEST SERPL-MCNC: 268 MG/DL (ref 100–199)
CO2 SERPL-SCNC: 20 MMOL/L (ref 20–29)
CREAT SERPL-MCNC: 0.77 MG/DL (ref 0.76–1.27)
EOSINOPHIL # BLD AUTO: 0.1 X10E3/UL (ref 0–0.4)
EOSINOPHIL NFR BLD AUTO: 3 %
ERYTHROCYTE [DISTWIDTH] IN BLOOD BY AUTOMATED COUNT: 13 % (ref 11.6–15.4)
GLOBULIN SER CALC-MCNC: 2.2 G/DL (ref 1.5–4.5)
GLUCOSE SERPL-MCNC: 107 MG/DL (ref 65–99)
HCT VFR BLD AUTO: 45.4 % (ref 37.5–51)
HDLC SERPL-MCNC: 60 MG/DL
HGB BLD-MCNC: 15.7 G/DL (ref 13–17.7)
IMM GRANULOCYTES # BLD AUTO: 0 X10E3/UL (ref 0–0.1)
IMM GRANULOCYTES NFR BLD AUTO: 0 %
IMMATURE CELLS  115398: NORMAL
LABORATORY COMMENT REPORT: ABNORMAL
LDLC SERPL CALC-MCNC: 189 MG/DL (ref 0–99)
LYMPHOCYTES # BLD AUTO: 1.4 X10E3/UL (ref 0.7–3.1)
LYMPHOCYTES NFR BLD AUTO: 31 %
MCH RBC QN AUTO: 31.7 PG (ref 26.6–33)
MCHC RBC AUTO-ENTMCNC: 34.6 G/DL (ref 31.5–35.7)
MCV RBC AUTO: 92 FL (ref 79–97)
MONOCYTES # BLD AUTO: 0.5 X10E3/UL (ref 0.1–0.9)
MONOCYTES NFR BLD AUTO: 11 %
MORPHOLOGY BLD-IMP: NORMAL
NEUTROPHILS # BLD AUTO: 2.5 X10E3/UL (ref 1.4–7)
NEUTROPHILS NFR BLD AUTO: 54 %
NRBC BLD AUTO-RTO: NORMAL %
PLATELET # BLD AUTO: 301 X10E3/UL (ref 150–450)
POTASSIUM SERPL-SCNC: 4.9 MMOL/L (ref 3.5–5.2)
PROT SERPL-MCNC: 7 G/DL (ref 6–8.5)
PSA SERPL-MCNC: <0.1 NG/ML (ref 0–4)
RBC # BLD AUTO: 4.95 X10E6/UL (ref 4.14–5.8)
SODIUM SERPL-SCNC: 135 MMOL/L (ref 134–144)
TRIGL SERPL-MCNC: 106 MG/DL (ref 0–149)
VLDLC SERPL CALC-MCNC: 19 MG/DL (ref 5–40)
WBC # BLD AUTO: 4.6 X10E3/UL (ref 3.4–10.8)

## 2022-02-14 DIAGNOSIS — E78.5 DYSLIPIDEMIA: ICD-10-CM

## 2022-02-14 RX ORDER — PRAVASTATIN SODIUM 20 MG
TABLET ORAL
Qty: 90 TABLET | Refills: 3 | Status: SHIPPED | OUTPATIENT
Start: 2022-02-14 | End: 2022-03-07

## 2022-02-21 ENCOUNTER — PATIENT MESSAGE (OUTPATIENT)
Dept: MEDICAL GROUP | Facility: IMAGING CENTER | Age: 62
End: 2022-02-21
Payer: COMMERCIAL

## 2022-02-21 DIAGNOSIS — J44.9 CHRONIC OBSTRUCTIVE PULMONARY DISEASE, UNSPECIFIED COPD TYPE (HCC): ICD-10-CM

## 2022-03-07 ENCOUNTER — OFFICE VISIT (OUTPATIENT)
Dept: NEUROLOGY | Facility: MEDICAL CENTER | Age: 62
End: 2022-03-07
Attending: PSYCHIATRY & NEUROLOGY
Payer: COMMERCIAL

## 2022-03-07 VITALS
WEIGHT: 183.64 LBS | BODY MASS INDEX: 27.83 KG/M2 | RESPIRATION RATE: 16 BRPM | DIASTOLIC BLOOD PRESSURE: 88 MMHG | HEART RATE: 81 BPM | OXYGEN SATURATION: 98 % | TEMPERATURE: 96.5 F | SYSTOLIC BLOOD PRESSURE: 130 MMHG | HEIGHT: 68 IN

## 2022-03-07 DIAGNOSIS — G40.209 PARTIAL EPILEPSY WITH IMPAIRMENT OF CONSCIOUSNESS (HCC): Primary | ICD-10-CM

## 2022-03-07 PROCEDURE — 99214 OFFICE O/P EST MOD 30 MIN: CPT | Performed by: PSYCHIATRY & NEUROLOGY

## 2022-03-07 RX ORDER — LACOSAMIDE 200 MG/1
200 TABLET ORAL 2 TIMES DAILY
Qty: 180 TABLET | Refills: 3 | Status: SHIPPED | OUTPATIENT
Start: 2022-03-07 | End: 2022-06-07

## 2022-03-07 RX ORDER — LACOSAMIDE 100 MG/1
100 TABLET ORAL DAILY
Qty: 90 TABLET | Refills: 0 | Status: SHIPPED | OUTPATIENT
Start: 2022-03-07 | End: 2022-06-07

## 2022-03-07 ASSESSMENT — FIBROSIS 4 INDEX: FIB4 SCORE: 0.78

## 2022-03-07 ASSESSMENT — ENCOUNTER SYMPTOMS
MEMORY LOSS: 0
SEIZURES: 1
LOSS OF CONSCIOUSNESS: 0
TREMORS: 1

## 2022-03-07 ASSESSMENT — PATIENT HEALTH QUESTIONNAIRE - PHQ9: CLINICAL INTERPRETATION OF PHQ2 SCORE: 0

## 2022-03-07 ASSESSMENT — PAIN SCALES - GENERAL: PAINLEVEL: NO PAIN

## 2022-03-07 NOTE — PROGRESS NOTES
Subjective     Fredy Zavala is a 61 y.o. male who presents with his wife Jovita, as always who does some of the talking, for follow-up, with a history of refractory focal onset seizures with altered sensorium and rare secondary generalization.     HPI    When last seen, I had wanted him to alter his Vimpat and Aptiom dosing regimen slightly, seizures were still happening consistently though it was always in the early morning hours.  Though I had requested him taking his first Vimpat at 4 AM, he has been able to work this out to about 6 AM.  The seizure tends to happen at about 7 or 8 AM.  He takes the next dose of Vimpat later in the evening, may be 7 PM, along with the Aptiom 1600 mg a little bit later.  He is tolerating the drugs without issue though he still has a fine tremulousness of the hands.    The seizures are still the focal onset with slightly altered sensorium, but Jovita recognizes he recovers more completely.  He still insists that they are short, awake and alert, he is never clearly aware that they do last longer.  Fortunately he has not had any secondarily generalized events, this last occurred when he was taken off medications during EMU monitoring.  There is still happening on a near daily basis.  He will occasionally get a single, isolated event during the daytime, random in onset, there have been maybe 3-4 of these in the last 4 months.    He is compliant, they have brought in a medication  that make sure he takes his medicines.  The problem is that he wakes up in the morning, at around 5:30 AM, does not take his medicine, simply goes back to bed and then wakes up a couple of hours later.    Unfortunately, they did not follow-up with an EEG study, though since he did not maintain a change medication regimen, this becomes more of a moot issue.    Medical, surgical and family histories are reviewed, there are no new drug allergies.  He is on Aptiom 1600 mg every evening, Vimpat 200 mg  "twice a day as above, and Qvar.  He stopped his Pravachol and albuterol inhaler.    Review of Systems   Neurological: Positive for tremors and seizures. Negative for loss of consciousness.   Psychiatric/Behavioral: Negative for memory loss.   All other systems reviewed and are negative.    Objective     /88 (BP Location: Right arm, Patient Position: Sitting, BP Cuff Size: Adult)   Pulse 81   Temp 35.8 °C (96.5 °F) (Temporal)   Resp 16   Ht 1.727 m (5' 8\")   Wt 83.3 kg (183 lb 10.3 oz)   SpO2 98%   BMI 27.92 kg/m²      Physical Exam    He does appear no acute distress.  Vital signs are stable.  He is quite cooperative.  There is no malar rash.  His neck is supple.  Cardiac evaluation is unremarkable.  There is no evidence of rash.     Neurological Exam    Fully oriented, there is only a slight impairment of word finding, but otherwise no issues with comprehension or repetition.  There is no apraxia or inattention.    PERRLA/EOMI, visual fields are full to movement detection on confrontation, facial movements are symmetric, there is no bulbar dysfunction.  Sensory exam is intact to temperature.  The tongue and uvula are midline.  Shoulder shrug and head rotation are normal.    Musculoskeletal exam does reveal fine tremulousness seen in the hands with sustained posture against gravity only.  It improves at rest though it is still there.  Tone is normal.  There is no hypokinesia or bradykinesia.  Strength is intact throughout.    He stands easily, armswing is symmetric, gait is normal and station and stride length.  There is no appendicular dystaxia, the tremulousness in the hands is seen through full range of motion.  Fine motor control is intact throughout.    Sensory exam is intact to temperature and vibration.    Assessment & Plan     1. Partial epilepsy with impairment of consciousness (HCC)  Still problematic, I would like to try one last time to see if we can simply adjust timing of medication to " "reduce a.m. seizure recurrences.  Even if effective, we are still dealing with a very low \"cushion\" around which he can move where he to be late with the drug, miss a dose, etc.  We have already talked about VNS, risks and benefits, I told him that if this new protocol does not work at all, he will need to speak with our epileptologist, one may send him there even with improved efficacy.    Vimpat 300 mg will be taken at 5:30 AM, and then a 200 mg dose in the evening as before.  Aptiom 1800 mg in the evening will remain unchanged.  They will notify the office in about 2 weeks to see how he is doing.  Hopefully the tremor in the hands does not worsen.  They will schedule follow-up EEG once we have stabilized his medication regimen.    I have discussed the case with Dr. Fredy Franklin MD, the epileptologist to reviewed this case while he was in the EMU, and who is certainly willing to discuss VNS placement moving forwards.  Otherwise I will follow-up with Fredy in about 4 months.    - lacosamide (VIMPAT) 200 MG Tab tablet; Take 1 Tablet by mouth 2 times a day for 720 doses.  Dispense: 180 Tablet; Refill: 3  - lacosamide (VIMPAT) 100 MG Tab tablet; Take 1 Tablet by mouth every day for 92 days.  Dispense: 90 Tablet; Refill: 0  - eslicarbazepine (APTIOM) 800 MG Tab; Take 2 Tablets by mouth every evening.  Dispense: 180 Tablet; Refill: 3    Time: 30 minutes in total spent in patient care including precharting, record review, discussions with healthcare staff and documentation.  This includes face-to-face time with the patient for exam, review, discussion, as well as counseling and coordinating care.        "

## 2022-03-07 NOTE — Clinical Note
Fredy, this is a patient who you had followed with me in the EMU.  He has a left temporal focus, seizures still recur, despite adjustments in his medicines.  We have discussed VNS, I think he is a good candidate, though I am trying to micromanage to see if we can get rid of the morning seizures.  Take a look at his chart.  I will let you know if I can get him in to see you.  This is just an FYI.

## 2022-03-08 ENCOUNTER — TELEPHONE (OUTPATIENT)
Dept: NEUROLOGY | Facility: MEDICAL CENTER | Age: 62
End: 2022-03-08

## 2022-03-08 NOTE — TELEPHONE ENCOUNTER
Vimpat 200mg Tablet    RTS - NEXT RFL [id number] DAYS TO RFL 66 LAST FILL [id number] VIA RETAIL FOR EMRG OVD, SCC=13 PER [facility] DISCRETION [id number] - 03/08/2022 10:36am

## 2022-03-09 ENCOUNTER — TELEPHONE (OUTPATIENT)
Dept: NEUROLOGY | Facility: MEDICAL CENTER | Age: 62
End: 2022-03-09
Payer: COMMERCIAL

## 2022-03-09 NOTE — TELEPHONE ENCOUNTER
LVM Advising pt to call me back to schedule with   ----- Message from Fredy Franklin M.D. sent at 3/9/2022  1:37 PM PST -----  Thanks. I am happy to see him about VNS. I think he would be a good candidate for it. Xcopri might also be a good choice for him.     I can ask my MA to arrange a visit with me, assuming he's agreeable. Linda, can you please get this patient in to see me?    Thanks,    Fredy   ----- Message -----  From: Brent Rutledge M.D.  Sent: 3/7/2022   8:58 AM PST  To: Fredy Franklin M.D., BETTY Golden, this is a patient who you had followed with me in the EMU.  He has a left temporal focus, seizures still recur, despite adjustments in his medicines.  We have discussed VNS, I think he is a good candidate, though I am trying to micromanage to see if we can get rid of the morning seizures.  Take a look at his chart.  I will let you know if I can get him in to see you.  This is just an FYI.

## 2022-03-30 ENCOUNTER — NON-PROVIDER VISIT (OUTPATIENT)
Dept: NEUROLOGY | Facility: MEDICAL CENTER | Age: 62
End: 2022-03-30
Attending: PSYCHIATRY & NEUROLOGY
Payer: COMMERCIAL

## 2022-03-30 DIAGNOSIS — G40.209 PARTIAL EPILEPSY WITH IMPAIRMENT OF CONSCIOUSNESS (HCC): ICD-10-CM

## 2022-03-30 PROCEDURE — 95816 EEG AWAKE AND DROWSY: CPT | Performed by: STUDENT IN AN ORGANIZED HEALTH CARE EDUCATION/TRAINING PROGRAM

## 2022-03-30 PROCEDURE — 95816 EEG AWAKE AND DROWSY: CPT | Mod: 26 | Performed by: STUDENT IN AN ORGANIZED HEALTH CARE EDUCATION/TRAINING PROGRAM

## 2022-03-30 NOTE — PROCEDURES
OUTPATIENT ROUTINE VIDEO ELECTROENCEPHALOGRAM REPORT      Referring provider:   Brent Rutledge M.D.  [address] [address]  [place] [id number]      DOS: 03/30/2022  (0 hours and 22 minutes of total recording time).     INDICATION:  Fredy Zavala 61 y.o. male presenting with seizure(s)    CURRENT ANTIEPILEPTIC AND/OR SEDATING REGIMEN:   Current Outpatient Medications   Medication Instructions   • eslicarbazepine (APTIOM) 1,600 mg, Oral, EVERY EVENING   • lacosamide (VIMPAT) 200 mg, Oral, 2 TIMES DAILY   • lacosamide (VIMPAT) 100 mg, Oral, DAILY        TECHNIQUE: Routine VEEG was set up by a Neurodiagnostic technologist who performed education to the patient and staff. A minimum of 23 electrodes and 23 channel recording was setup and performed by Neurodiagnostic technologist, in accordance with the international 10-20 system. The study was reviewed in bipolar and referential montages. The recording examined the patient in the  awake and drowsy state(s).     DESCRIPTION OF THE RECORD:  During wakefulness, the background was continuous and showed a 8-9 Hz posterior dominant rhythm.  There was reactivity to eye closure/opening.  An anterior-posterior gradient was noted with faster beta frequencies seen anteriorly.  During drowsiness, theta/delta frequencies were seen.    EEG Sleep: Sleep was not captured.    ACTIVATION PROCEDURES:   Photic driving was performed in a stepwise fashion from 1-30 Hz and did not elicit any abnormalities on EEG.    ICTAL AND INTERICTAL FINDINGS:   No focal or generalized epileptiform activity noted.     Intermittent left frontotemporal theta>delta slowing      No clinical events or seizures were reported or recorded during the study.     EKG: Sampling of the EKG recording did not demonstrate any abnormalities      EVENTS:  None    INTERPRETATION:   Abnormal video EEG recording in the awake and drowsy state(s):  -Intermittent left frontotemporal theta>delta slowing, suggestive of  focal dysfunction and/or structural abnormality in this region. Clinical and radiographic correlation recommended.  - No epileptiform discharges seen   - No seizures. Clinical correlation is recommended.          Fredy Franklin MD  Epilepsy and General Neurology  Department of Neurology  Instructor of Clinical Neurology [facility].   Office: [phone]  Fax: [phone]

## 2022-06-04 DIAGNOSIS — G40.209 PARTIAL EPILEPSY WITH IMPAIRMENT OF CONSCIOUSNESS (HCC): ICD-10-CM

## 2022-06-07 ENCOUNTER — TELEPHONE (OUTPATIENT)
Dept: NEUROLOGY | Facility: MEDICAL CENTER | Age: 62
End: 2022-06-07

## 2022-06-07 ENCOUNTER — TELEPHONE (OUTPATIENT)
Dept: NEUROLOGY | Facility: MEDICAL CENTER | Age: 62
End: 2022-06-07
Payer: COMMERCIAL

## 2022-06-07 RX ORDER — LACOSAMIDE 200 MG/1
TABLET, FILM COATED ORAL
Qty: 180 TABLET | Refills: 1 | Status: SHIPPED | OUTPATIENT
Start: 2022-06-07 | End: 2022-10-11 | Stop reason: SDUPTHER

## 2022-06-07 RX ORDER — LACOSAMIDE 100 MG/1
TABLET, FILM COATED ORAL
Qty: 90 TABLET | Refills: 1 | Status: SHIPPED | OUTPATIENT
Start: 2022-06-07 | End: 2022-10-11 | Stop reason: SDUPTHER

## 2022-06-07 NOTE — TELEPHONE ENCOUNTER
Vimpat 100mg Tablet    PA not required, Lacosamide 100mg Tablets is covered NOT the brand, TC paid copay $17.52 #30/30 DS Max 30 DS notifying liachinedu Soriano, may get better copay and will get a 90 DS from Home delivery or mail order. - 06/07/2022 11:35am    Vimpat 200mg Tablet    Request rec'd via [employer], ran TC via [employer], TC paid copay $20.00 #60/30 DS Max 30 DS we are not in their 90 day network, may get better copay plus would get 90 DS, will notify liliana Soriano  - 06/07/2022 11:16am

## 2022-06-07 NOTE — TELEPHONE ENCOUNTER
Contacted patient at [phone] to discuss [facility] and services/benefits offered. No answer, left voicemail.    Leeann Bright  Rx Coordinator   [phone]

## 2022-07-11 ENCOUNTER — OFFICE VISIT (OUTPATIENT)
Dept: NEUROLOGY | Facility: MEDICAL CENTER | Age: 62
End: 2022-07-11
Attending: PSYCHIATRY & NEUROLOGY
Payer: COMMERCIAL

## 2022-07-11 VITALS
SYSTOLIC BLOOD PRESSURE: 126 MMHG | OXYGEN SATURATION: 98 % | HEART RATE: 70 BPM | RESPIRATION RATE: 18 BRPM | BODY MASS INDEX: 26.68 KG/M2 | DIASTOLIC BLOOD PRESSURE: 74 MMHG | WEIGHT: 175.49 LBS

## 2022-07-11 DIAGNOSIS — G25.1 TREMOR DUE TO MULTIPLE DRUGS: ICD-10-CM

## 2022-07-11 DIAGNOSIS — G40.209 PARTIAL EPILEPSY WITH IMPAIRMENT OF CONSCIOUSNESS (HCC): Primary | ICD-10-CM

## 2022-07-11 PROCEDURE — 99212 OFFICE O/P EST SF 10 MIN: CPT | Performed by: PSYCHIATRY & NEUROLOGY

## 2022-07-11 PROCEDURE — 99214 OFFICE O/P EST MOD 30 MIN: CPT | Performed by: PSYCHIATRY & NEUROLOGY

## 2022-07-11 RX ORDER — PRAVASTATIN SODIUM 20 MG
TABLET ORAL
COMMUNITY
Start: 2022-06-04 | End: 2022-09-21 | Stop reason: SDUPTHER

## 2022-07-11 RX ORDER — ALBUTEROL SULFATE 90 UG/1
2 AEROSOL, METERED RESPIRATORY (INHALATION) EVERY 6 HOURS PRN
COMMUNITY
End: 2022-09-27 | Stop reason: SDUPTHER

## 2022-07-11 ASSESSMENT — FIBROSIS 4 INDEX: FIB4 SCORE: 0.79

## 2022-07-11 ASSESSMENT — ENCOUNTER SYMPTOMS
TREMORS: 1
SEIZURES: 1
LOSS OF CONSCIOUSNESS: 0

## 2022-07-11 NOTE — PROGRESS NOTES
Subjective     Fredy Zavala is a 62 y.o. 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    HPI    When last seen, we had increased the Vimpat dosing regimen, and change the timing.  He now takes a 300 mg dose at about 7 AM or little earlier, the 200 mg evening dose taken at about 5 PM.  Aptiom 1600 mg is taken at about 8 PM every night.    With this regimen, the seizures are much less frequent, he can actually have 3-4 days without an event, and when they recur, they are now doing so throughout the day.  He no longer has any more consistent a.m. occurrences.  If he is late with his a.m. dose of Aptiom, the early a.m. seizure frequency increases.  The events themselves are still of the same duration when they do happen.  He recovers a bit more easily.  They are now occurring in isolation rather than in clusters.    Unfortunately, the tremulousness from his drugs has worsened with the higher dose of Vimpat.  He is still more aware of it with the left hand but it is bilateral.  It makes it difficult to use the extremities with holding objects, fine motor control, etc.  In the mornings he also notes more of an unsteadiness when he walks though this improves as the day goes on.    He did repeat his EEG study on this newer regimen, the left temporal focal abnormalities are there but there is no underlying nonconvulsive seizure activity or epileptic discharge seen, simply rhythmic theta and delta slowing.  Compared to previous EEG tracings, this is an improvement.    Medical, surgical and family histories are reviewed, there are no new drug allergies.  Other than the Vimpat and Aptiom as above, he remains on his inhalers as well as Pravachol.    Review  of Systems   Neurological: Positive for tremors and seizures. Negative for loss of consciousness.   All other systems reviewed and are negative.    Objective     /74 (BP Location: Right arm, Patient Position: Sitting, BP Cuff Size: Adult)   Pulse 70   Resp 18   Wt 79.6 kg (175 lb 7.8 oz)   SpO2 98%   BMI 26.68 kg/m²      Physical Exam    He appears in no acute distress.  Vital signs are stable.  There is no malar rash.  The neck is supple.  Cardiac evaluation is unremarkable.     Neurological Exam     In quick and cursory fashion, with observation, mental status, cranial nerve, musculoskeletal, reflex, coordination, and since evaluations are all intact.     Assessment & Plan     1. 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  I would like him to be seen by Dr. Fredy Franklin MD, or epileptologist in consultation for possible VNS placement.  We talked in brief degree about vagal nerve stimulator use, potential benefits, long-term efficacies, as well as side effects.  Hopefully if this proves to be more effective and increase his seizure control, we may be able to titrate him off some of the Vimpat dosing though he was told that he will likely need medication in conjunction with the VNS.  Unfortunately his seizures happen quickly, he is not aware of this, so using the stimulator as a acute treatment for seizure recurrence is limited.    I have discussed the case with Dr. Franklin, and he is comfortable consulting with the patient about VNS.    2. Tremor due to multiple drugs  Made worse because of the elevated Vimpat dosing, he understands that this will be an issue as long as he remains on the drug, we are simply trying to mitigate its severity if possible.  Unfortunately it also means we cannot increase the Vimpat.  The unsteadiness as well has to do with the Vimpat at a higher dose.    Time: 30 minutes in total spent on patient care including  per charting, record review, discussion with healthcare staff and documentation.  This includes face-to-face time with the patient for exam, review, discussion, as well as counseling and coordinating care.

## 2022-07-11 NOTE — Clinical Note
Fredy, you and I discussed this patient in terms of possible VNS placement.  I took the liberty of putting him on your schedule moving forwards, giving you your required hour of time.  I will continue to follow with him otherwise for now.

## 2022-07-11 NOTE — Clinical Note
Fredy, you and I had discussed this case.  You did review his EMU evaluations and helped direct his medication regimen.  He is still having seizure breakthroughs, but things have gotten better.  Unfortunately, he is now getting toxic on his present pharmacologic profile, we talked about VNS as a legitimate treatment option.  He would like to see you.  I will continue to follow him in the interim and long-term if you feel he is not a VNS candidate.  I took the liberty of getting him on your schedule by giving you the required time that you need.  Thanks.  Brent normal...

## 2022-08-31 ENCOUNTER — OFFICE VISIT (OUTPATIENT)
Dept: NEUROLOGY | Facility: MEDICAL CENTER | Age: 62
End: 2022-08-31
Attending: STUDENT IN AN ORGANIZED HEALTH CARE EDUCATION/TRAINING PROGRAM
Payer: COMMERCIAL

## 2022-08-31 VITALS
WEIGHT: 186.95 LBS | HEIGHT: 68 IN | TEMPERATURE: 97.8 F | HEART RATE: 72 BPM | DIASTOLIC BLOOD PRESSURE: 70 MMHG | SYSTOLIC BLOOD PRESSURE: 130 MMHG | OXYGEN SATURATION: 98 % | RESPIRATION RATE: 14 BRPM | BODY MASS INDEX: 28.33 KG/M2

## 2022-08-31 DIAGNOSIS — E78.5 DYSLIPIDEMIA: ICD-10-CM

## 2022-08-31 DIAGNOSIS — R06.83 SNORING: ICD-10-CM

## 2022-08-31 DIAGNOSIS — R06.02 SHORTNESS OF BREATH: ICD-10-CM

## 2022-08-31 DIAGNOSIS — G25.1 TREMOR DUE TO MULTIPLE DRUGS: ICD-10-CM

## 2022-08-31 DIAGNOSIS — G47.30 SLEEP APNEA, UNSPECIFIED TYPE: ICD-10-CM

## 2022-08-31 DIAGNOSIS — G40.209 PARTIAL EPILEPSY WITH IMPAIRMENT OF CONSCIOUSNESS (HCC): ICD-10-CM

## 2022-08-31 DIAGNOSIS — J44.9 CHRONIC OBSTRUCTIVE PULMONARY DISEASE, UNSPECIFIED COPD TYPE (HCC): ICD-10-CM

## 2022-08-31 DIAGNOSIS — Z91.89 AT RISK FOR SLEEP APNEA: ICD-10-CM

## 2022-08-31 DIAGNOSIS — G47.19 EXCESSIVE DAYTIME SLEEPINESS: ICD-10-CM

## 2022-08-31 PROCEDURE — 99215 OFFICE O/P EST HI 40 MIN: CPT | Performed by: STUDENT IN AN ORGANIZED HEALTH CARE EDUCATION/TRAINING PROGRAM

## 2022-08-31 PROCEDURE — 99212 OFFICE O/P EST SF 10 MIN: CPT | Performed by: STUDENT IN AN ORGANIZED HEALTH CARE EDUCATION/TRAINING PROGRAM

## 2022-08-31 PROCEDURE — G2212 PROLONG OUTPT/OFFICE VIS: HCPCS | Performed by: STUDENT IN AN ORGANIZED HEALTH CARE EDUCATION/TRAINING PROGRAM

## 2022-08-31 ASSESSMENT — FIBROSIS 4 INDEX: FIB4 SCORE: 0.79

## 2022-08-31 NOTE — PROGRESS NOTES
NEUROLOGY FOLLOW-UP - 08/31/2022   REFERRING PROVIDER  No referring provider defined for this encounter.    PCP  Toshia Rendon   [phone]   [facility] [[id number]]     REASON FOR VISIT: Fredy Zavala 62 y.o. male presents today for follow-up.  Patient has refractory symptomatic focal onset seizures with impaired awareness related to mesial temporal sclerosis.      INTERVAL HISTORY:  Patient returns with his wife and after recently having seen Dr. Rutledge on July 11, 2022.  Dr. Rutledge has asked me to follow-up with this patient to assist in his management and to discuss alternatives to pharmacological management such as but not limited to VNS.  Patient is on Vimpat 300 mg in the a.m., 200 mg in the evening around 5 PM.  Aptiom 600 mg is taken 8 PM every night.  Although his seizures are much less frequent he still has ongoing seizures.  He does have 3 to 4 days without an event.  Seizures appear to be same duration but his recovery is quicker.  He is no longer having them in clusters, mostly isolated seizures.    Does report worsening tremors on higher doses of Vimpat, possibly also Aptiom.  He has difficulty holding objects, difficulty with fine motor control.  Some mild unsteadiness in the morning when he walks as well but this generally improves as the days go on.    Patient's PMH, PSH, FH, and SH were reviewed.    ROS: All review of systems complete and are negative except as documented    CURRENT MEDICATIONS AT THE TIME OF THIS ENCOUNTER:    Current Outpatient Medications:     Aptiom, TAKE TWO TABLETS BY MOUTH EVERY EVENING    eslicarbazepine, 1,600 mg, Oral, Q EVENING    Trelegy Ellipta, , Taking    pravastatin, , Taking    albuterol, 2 Puff, Inhalation, Q6HRS PRN, PRN    Vimpat, TAKE ONE TABLET BY MOUTH EVERY DAY, Taking    Vimpat, TAKE ONE TABLET BY MOUTH TWICE A DAY, Taking     EXAM:   Encounter Vitals  /70 (BP Location: Right arm, Patient Position: Sitting, BP  "Cuff Size: Adult)   Pulse 72   Temp 36.6 °C (97.8 °F) (Temporal)   Resp 14   Ht 1.727 m (5' 8\")   Wt 84.8 kg (186 lb 15.2 oz)   SpO2 98%            Physical Exam:  Physical Exam  Constitutional:       General: He is not in acute distress.  Neurological:      Motor: Motor strength is normal.      Neurological Exam   Neurological Exam  Mental Status  Awake, alert and oriented to person, place and time.    Motor   Strength is 5/5 throughout all four extremities.    Coordination  Right: Finger-to-nose abnormality:Left: Finger-to-nose abnormality:  Mild dysmetria on finger-nose-finger.    Gait  Casual gait is normal including stance, stride, and arm swing.     ALL DATA (I.e. labs, procedures, imaging, reports, clinical notes, etc.) FROM [facility] AND/OR OUTSIDE SOURCES, IF AVAILABLE, PERSONALLY REVIEWED:       ASSESSMENT, EDUCATION, AND COUNSELING:  This is a 62 y.o. male patient who presents to the neurology clinic. We had an extensive discussion about the patient's symptoms, signs, and work-up to date, if any. We discussed potential and/or definitive diagnoses, work-up, and potential treatments.       PLAN:  If applicable, the work-up such as labs, imaging, procedures, and/or other testing, referrals, and/or recommended treatment strategies are listed below.    Visit Diagnoses     ICD-10-CM   1. 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 Chronic obstructive pulmonary disease, unspecified COPD type (HCC)  J44.9   4. Dyslipidemia  E78.5   5. At risk for sleep apnea  Z91.89   6. Sleep apnea, unspecified type   G47.30   7. Excessive daytime sleepiness  G47.19   8. Snoring  R06.83   9. Shortness of breath  R06.02        Orders Placed This Encounter    Polysomnography, 4 or More    CBC WITH DIFFERENTIAL    Comp Metabolic Panel    Referral to Pulmonary and Sleep Medicine    EKG    PULMONARY FUNCTION TESTS -Test requested: Complete Pulmonary Function Test "      I had an extensive discussion with the patient and wife about the option of VNS.  They asked excellent questions.  I explained that age is not a contraindication for this procedure.  However, he does have comorbidities that do raise concern about pursuing VNS.  In particular he has underlying lung disease with COPD, risk factors for sleep apnea.  Both of these conditions can be made worse with VNS.  He also does not have an updated EKG to evaluate for bradycardia arrhythmias which can be potentially made worse with VNS.  Patient will consider VNS and we will reengage discussion after we obtain pulmonary function tests, EKG, and have a formal evaluation with sleep medicine.  We did discuss alternative medications such as Xcopri in place of Vimpat in the future.  Also discussed options to lessen medication side effects such as tremors and ataxia which I suspect are more related to his Aptiom but could be Vimpat contributing.  Recommend that he break up the Aptiom dose and take it twice per day 12 hours apart so as to avoid an intense peak dose side effect.  We did not spend a lot of the discussion on nonpharmacal logical management outside of VNS, such as RNS but this may ultimately be a better option.  We will explore and discuss this more next visit.      We also we will also closely follow-up on patient's medication side effects, in particular his tremors and ataxia which are impairing his ability to use his hands, particularly fine motor dexterity.      As mentioned above, patient with high risk for sleep apnea. STOP BANG score of 4 for male, snoring, daytime sleepiness, and age.  Referral to sleep medicine and sleep study placed.  Also obtaining updated labs as above.        BILLING DOCUMENTATION:     I spent a total of I spent a total of 90 minutes on the day of the visit.    minutes of face-to-face time in this visit. Over 50% of the time of the visit today was spent on counseling and/or coordination of  care wtih the patient and/or family, as above in assessment in plan.    Fredy Franklin MD  Epilepsy and General Neurology  Department of Neurology  Clinical  of Neurology [employer].

## 2022-08-31 NOTE — PATIENT INSTRUCTIONS
Neurology Clinic Visit     IMPORTANT: If imaging, procedure(s), AND/or referrals were placed for you:  Contact [facility] Scheduling if you have not heard from them in 5 day: [phone]    Outpatient [facility] Imaging Sites.  [address]  Mon-Fri 8am-5pm   [address] (Breast Center) Mon-Fri 7am-4pm    [address]  Mon-Fri 8am-5pm  [facility] Radiology 7am-6:30pm  [address] [facility] Mon-Fri 8am-7pm  Sat 9am-6pm   [address]  Mon-Fri 8am-7pm and Sat/Sun 9am-5pm  [address]  Mon-Fri 8am-5pm  [address] (PET/CT)  Mon-Fri 8:30am-4:30pm     If labs were ordered for you:  To Schedule an appointment for lab services, please call [phone] or visit [email or web address].  [facility] Lab Services Convenient Locations:    [place]: [address] (Ground Floor)  [address] [facility] (Admitting Entrance)  [address]  [address]  [address] [facility], [address]  [address] [facility]  [address]: [address]  [address]       [place]: [address] [place] [address] Dr  [address]     [facility]     [address] [place] [address]     Laboratory Hours: 6:30am - 6pm  Monday - Friday,   7am - 2pm Saturday    [facility]      [address] [facility] [place] [address]      Laboratory Hours:  7:30am - 4pm  Monday - Friday,  9am - 3pm Saturday    [facility]     [address]    [place] [address]      Laboratory Hours:  7:00am - 5:30pm  Monday - Friday    [facility]      [address] [place] [address]       Laboratory Hours:  7:30am - 4:30pm  Monday - Friday    [facility]       [address] [place] [address]       Laboratory Hours:  8am - 5pm  Monday - Friday    [facility]       [address] [place] [id number]       Laboratory hours:  7:30am - 4pm   Monday - Friday    GENERAL REMINDERS:  Request refills 1 week in advance to ensure you do not run out of medications  All diagnostic study results will be reviewed at your next visit, UNLESS there are urgent results that need to be acted on sooner.  Please remember that it is the your responsibility to check with your Insurance for benefit coverage for visit / visits.  24 hours notice is required for all appointment changes or cancellation.  Please arrive 20 min. before your appointment time  Please note that because Dr. Franklin has high daily clinic volume, he is unable to accommodate late arrivals. If you are more than 15 minutes late for the scheduled appointment you will be asked to reschedule  Please bring the following with you:  1) Picture ID  2) Insurance card  3) An updated list of ALL your medications and their dosages (prescribed medications, over the counter medications, and all supplements), as well as allergies with you at all times  4) A list of questions, concerns, comments that you wish to discuss with Dr. Noemi Franklin MD  General Neurologist and Epileptologist  Department of Neurology  Instructor of Clinical Neurology [facility].       SLEEP STUDY INSTRUCTIONS    1. Our main concern is to provide the best test and evaluation of your sleep and your cooperation in following the guidelines is very necessary.    2. We have no facilities for family members or guests at the sleep center. Special arrangements will be made for children requiring overnight sleep studies.    3. Unless otherwise instructed, AVOID alcoholic beverages on the day of your sleep study.    4. DO NOT drink coffee or caffeine-containing beverages after 12:00 noon on the day of your sleep study.    5. There is NO smoking at the sleep center.    6. Try to maintain a usual daytime schedule prior to the study (avoid unusual physical activity or meals).    7. DO NOT take a nap on the day  of your study.    8. This is an outpatient procedure (test); therefore, nursing services, medications, and meals ARE NOT provided. If you take medications, bring them with you and take them on the schedule you do at home.    9. Please fill your sleep aid prescription (Ambien or Lunesta) and bring to your sleep study. Even patients who normally have no problem going to sleep often need a sleep aid in this different environment.    10. We ask that you wear conventional sleep attire (pajamas or sweats) for the sleep study. We discourage patients from wearing only their underwear to bed. We recommend two-piece pajamas as the techs will need to apply sensors to your stomach.    11. Please shampoo your hair the day of the sleep study. Please DO NOT use any other hair or skin products before your arrival (e.g., mousse, gel, hair or body spray, perfume, body lotion etc.) NOTE: Women should not wear heavy makeup prior to arrival as some wires are taped to the face area.    12. The technician will be applying several small electrodes to the scalp, eye area, chin, chest, and legs, plus respiratory effort belts around the chest. Also, there will be a device placed directly under the nose. (THIS WILL NOT OBSTRUCT YOUR BREATHING.) This is a painless procedure and the skin is not broken.    13. The test is generally completed in six to eight hours; We are usually done between 6 - 7 a.m., unless you are scheduled for a nap study. You may need to come back another night for a second study to complete your treatment plan.    14. Patients who are scheduled for an MSLT (nap study) will stay at the sleep center for the day following their nighttime study. You will be notified if a nap study was ordered for you at the time the night study is scheduled. Generally, patients having a nap study will leave the sleep center by 4 p.m.    15. You will need to bring food for the following day if you are scheduled for a nap study. A refrigerator  and microwave are available.    16. A bathroom is available for your use.    17. We are able to adjust the room temperature for your comfort. Please let the technologist know if you are uncomfortable during the study.    18. If you sleep better with a special pillow or stuffed animal, you may bring it along. Service animals are the only live animals permitted.    19. Cable T.V. is available.    20. You will be scheduled for a follow-up appointment three to five days after the sleep study to review your results.    21. A copy of your sleep study is sent to the referring physician approximately two weeks after your study.    22. Any questions can be directed to our staff at [phone].    23. If CPAP therapy is applied, a home unit will be ordered for you through the home medical equipment company. You will be contacted to schedule delivery after insurance authorization.

## 2022-09-04 DIAGNOSIS — G40.209 PARTIAL EPILEPSY WITH IMPAIRMENT OF CONSCIOUSNESS (HCC): ICD-10-CM

## 2022-09-14 DIAGNOSIS — G40.209 PARTIAL EPILEPSY WITH IMPAIRMENT OF CONSCIOUSNESS (HCC): ICD-10-CM

## 2022-09-16 ENCOUNTER — TELEPHONE (OUTPATIENT)
Dept: NEUROLOGY | Facility: MEDICAL CENTER | Age: 62
End: 2022-09-16

## 2022-09-16 RX ORDER — ESLICARBAZEPINE ACETATE 800 MG/1
TABLET ORAL
Qty: 60 TABLET | Refills: 5 | Status: SHIPPED | OUTPATIENT
Start: 2022-09-16 | End: 2022-10-11 | Stop reason: SDUPTHER

## 2022-09-16 NOTE — TELEPHONE ENCOUNTER
Eslicarazepine (Aptiom) 800mg Tablet    Request rec'd via [employer], RTC ILANA Alvarado paid copay $50.00 - [employer]ATION [id number] MAXIMUM DAY SUPPLY  ALLOWED #60/30 DS may get a better/lower copay @ [employer] delivery as we are NOT in their 90 day retail network, notifying liaison Liza Valente. - 09/16/2022 8:26am

## 2022-09-18 SDOH — ECONOMIC STABILITY: TRANSPORTATION INSECURITY
IN THE PAST 12 MONTHS, HAS LACK OF RELIABLE TRANSPORTATION KEPT YOU FROM MEDICAL APPOINTMENTS, MEETINGS, WORK OR FROM GETTING THINGS NEEDED FOR DAILY LIVING?: NO

## 2022-09-18 SDOH — ECONOMIC STABILITY: FOOD INSECURITY: WITHIN THE PAST 12 MONTHS, YOU WORRIED THAT YOUR FOOD WOULD RUN OUT BEFORE YOU GOT MONEY TO BUY MORE.: NEVER TRUE

## 2022-09-18 SDOH — ECONOMIC STABILITY: HOUSING INSECURITY
IN THE LAST 12 MONTHS, WAS THERE A TIME WHEN YOU DID NOT HAVE A STEADY PLACE TO SLEEP OR SLEPT IN A SHELTER (INCLUDING NOW)?: NO

## 2022-09-18 SDOH — HEALTH STABILITY: PHYSICAL HEALTH: ON AVERAGE, HOW MANY DAYS PER WEEK DO YOU ENGAGE IN MODERATE TO STRENUOUS EXERCISE (LIKE A BRISK WALK)?: 7 DAYS

## 2022-09-18 SDOH — ECONOMIC STABILITY: INCOME INSECURITY: IN THE LAST 12 MONTHS, WAS THERE A TIME WHEN YOU WERE NOT ABLE TO PAY THE MORTGAGE OR RENT ON TIME?: NO

## 2022-09-18 SDOH — HEALTH STABILITY: PHYSICAL HEALTH: ON AVERAGE, HOW MANY MINUTES DO YOU ENGAGE IN EXERCISE AT THIS LEVEL?: 30 MIN

## 2022-09-18 SDOH — ECONOMIC STABILITY: FOOD INSECURITY: WITHIN THE PAST 12 MONTHS, THE FOOD YOU BOUGHT JUST DIDN'T LAST AND YOU DIDN'T HAVE MONEY TO GET MORE.: NEVER TRUE

## 2022-09-18 SDOH — HEALTH STABILITY: MENTAL HEALTH
STRESS IS WHEN SOMEONE FEELS TENSE, NERVOUS, ANXIOUS, OR CAN'T SLEEP AT NIGHT BECAUSE THEIR MIND IS TROUBLED. HOW STRESSED ARE YOU?: NOT AT ALL

## 2022-09-18 SDOH — ECONOMIC STABILITY: HOUSING INSECURITY

## 2022-09-18 SDOH — ECONOMIC STABILITY: TRANSPORTATION INSECURITY
IN THE PAST 12 MONTHS, HAS THE LACK OF TRANSPORTATION KEPT YOU FROM MEDICAL APPOINTMENTS OR FROM GETTING MEDICATIONS?: NO

## 2022-09-18 SDOH — ECONOMIC STABILITY: TRANSPORTATION INSECURITY
IN THE PAST 12 MONTHS, HAS LACK OF TRANSPORTATION KEPT YOU FROM MEETINGS, WORK, OR FROM GETTING THINGS NEEDED FOR DAILY LIVING?: NO

## 2022-09-18 SDOH — ECONOMIC STABILITY: INCOME INSECURITY: HOW HARD IS IT FOR YOU TO PAY FOR THE VERY BASICS LIKE FOOD, HOUSING, MEDICAL CARE, AND HEATING?: NOT VERY HARD

## 2022-09-18 ASSESSMENT — SOCIAL DETERMINANTS OF HEALTH (SDOH)
IN A TYPICAL WEEK, HOW MANY TIMES DO YOU TALK ON THE PHONE WITH FAMILY, FRIENDS, OR NEIGHBORS?: ONCE A WEEK
DO YOU BELONG TO ANY CLUBS OR ORGANIZATIONS SUCH AS CHURCH GROUPS UNIONS, FRATERNAL OR ATHLETIC GROUPS, OR SCHOOL GROUPS?: NO
HOW OFTEN DO YOU ATTENT MEETINGS OF THE CLUB OR ORGANIZATION YOU BELONG TO?: NEVER
HOW MANY DRINKS CONTAINING ALCOHOL DO YOU HAVE ON A TYPICAL DAY WHEN YOU ARE DRINKING: PATIENT DOES NOT DRINK
HOW HARD IS IT FOR YOU TO PAY FOR THE VERY BASICS LIKE FOOD, HOUSING, MEDICAL CARE, AND HEATING?: NOT VERY HARD
HOW OFTEN DO YOU HAVE A DRINK CONTAINING ALCOHOL: NEVER
HOW OFTEN DO YOU ATTEND CHURCH OR RELIGIOUS SERVICES?: NEVER
HOW OFTEN DO YOU HAVE SIX OR MORE DRINKS ON ONE OCCASION: NEVER
HOW OFTEN DO YOU GET TOGETHER WITH FRIENDS OR RELATIVES?: NEVER
IN A TYPICAL WEEK, HOW MANY TIMES DO YOU TALK ON THE PHONE WITH FAMILY, FRIENDS, OR NEIGHBORS?: ONCE A WEEK
HOW OFTEN DO YOU GET TOGETHER WITH FRIENDS OR RELATIVES?: NEVER
HOW OFTEN DO YOU ATTENT MEETINGS OF THE CLUB OR ORGANIZATION YOU BELONG TO?: NEVER
WITHIN THE PAST 12 MONTHS, YOU WORRIED THAT YOUR FOOD WOULD RUN OUT BEFORE YOU GOT THE MONEY TO BUY MORE: NEVER TRUE
HOW OFTEN DO YOU ATTEND CHURCH OR RELIGIOUS SERVICES?: NEVER
DO YOU BELONG TO ANY CLUBS OR ORGANIZATIONS SUCH AS CHURCH GROUPS UNIONS, FRATERNAL OR ATHLETIC GROUPS, OR SCHOOL GROUPS?: NO

## 2022-09-18 ASSESSMENT — LIFESTYLE VARIABLES
HOW OFTEN DO YOU HAVE A DRINK CONTAINING ALCOHOL: NEVER
HOW OFTEN DO YOU HAVE SIX OR MORE DRINKS ON ONE OCCASION: NEVER
SKIP TO QUESTIONS 9-10: 1
AUDIT-C TOTAL SCORE: 0
HOW MANY STANDARD DRINKS CONTAINING ALCOHOL DO YOU HAVE ON A TYPICAL DAY: PATIENT DOES NOT DRINK

## 2022-09-21 ENCOUNTER — OFFICE VISIT (OUTPATIENT)
Dept: MEDICAL GROUP | Facility: PHYSICIAN GROUP | Age: 62
End: 2022-09-21
Payer: MEDICARE

## 2022-09-21 VITALS
WEIGHT: 185 LBS | BODY MASS INDEX: 28.04 KG/M2 | OXYGEN SATURATION: 98 % | HEIGHT: 68 IN | RESPIRATION RATE: 16 BRPM | TEMPERATURE: 98.5 F | DIASTOLIC BLOOD PRESSURE: 74 MMHG | SYSTOLIC BLOOD PRESSURE: 142 MMHG | HEART RATE: 59 BPM

## 2022-09-21 DIAGNOSIS — Z12.11 COLON CANCER SCREENING: ICD-10-CM

## 2022-09-21 DIAGNOSIS — Z76.89 ENCOUNTER TO ESTABLISH CARE: ICD-10-CM

## 2022-09-21 DIAGNOSIS — G40.209 PARTIAL EPILEPSY WITH IMPAIRMENT OF CONSCIOUSNESS (HCC): ICD-10-CM

## 2022-09-21 DIAGNOSIS — E56.9 VITAMIN DEFICIENCY: ICD-10-CM

## 2022-09-21 DIAGNOSIS — R73.01 ELEVATED FASTING GLUCOSE: ICD-10-CM

## 2022-09-21 DIAGNOSIS — R22.1 SWOLLEN UVULA: ICD-10-CM

## 2022-09-21 DIAGNOSIS — E78.5 DYSLIPIDEMIA: ICD-10-CM

## 2022-09-21 DIAGNOSIS — Z85.46 HISTORY OF PROSTATE CANCER: ICD-10-CM

## 2022-09-21 DIAGNOSIS — Z23 NEED FOR VACCINATION: ICD-10-CM

## 2022-09-21 DIAGNOSIS — J44.9 CHRONIC OBSTRUCTIVE PULMONARY DISEASE, UNSPECIFIED COPD TYPE (HCC): ICD-10-CM

## 2022-09-21 PROCEDURE — 90677 PCV20 VACCINE IM: CPT | Performed by: NURSE PRACTITIONER

## 2022-09-21 PROCEDURE — 99214 OFFICE O/P EST MOD 30 MIN: CPT | Mod: 25 | Performed by: NURSE PRACTITIONER

## 2022-09-21 PROCEDURE — G0009 ADMIN PNEUMOCOCCAL VACCINE: HCPCS | Performed by: NURSE PRACTITIONER

## 2022-09-21 RX ORDER — PRAVASTATIN SODIUM 20 MG
20 TABLET ORAL NIGHTLY
Qty: 90 TABLET | Refills: 3 | Status: SHIPPED | OUTPATIENT
Start: 2022-09-21 | End: 2023-03-31

## 2022-09-21 ASSESSMENT — FIBROSIS 4 INDEX: FIB4 SCORE: 0.79

## 2022-09-21 NOTE — PROGRESS NOTES
Subjective:     CC:    Chief Complaint   Patient presents with    Establish Care        HISTORY OF THE PRESENT ILLNESS: Patient is a 62 y.o. male, here today with his wife Jovita to establish care. Prior PCP was NP Ohm. The below problems were discussed/reviewed at this visit:    Problem   Vitamin Deficiency    Low vit D in the past  Not currently on daily supplements     Elevated Fasting Glucose     Latest Reference Range & Units 3/29/21 10:48 11/11/21 07:08   Glucose 65 - 99 mg/dL 137 (H) 107 (H)        Partial epilepsy with impairment of consciousness (HCC)    Followed by Dr Galvan/[facility]  Taking Vimpat 300 mg QAM, 200 mg QPM;  Aptiom 1600mg QPM  He recently saw Dr Franklin to discuss VNS (vagal nerve stimilator); pending EKG, sleep study     History of Prostate Cancer    S/p prostatectomy in 2012  No longer following urology  PSA <0.01 in 11/2021  Denies urinary symptoms     Dyslipidemia    Taking Pravastatin 20mg QD     Copd (Chronic Obstructive Pulmonary Disease) ([facility])    Never smoked cigarettes.  Confirmed COPD with hyperinflation and pulmonary function test with pulmonologist.  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 90 Tablet 3    APTIOM 800 MG Tab TAKE TWO TABLETS BY MOUTH EVERY  "EVENING 60 Tablet 5    eslicarbazepine (APTIOM) 800 MG Tab Take 2 Tablets by mouth every evening. 180 Tablet 3    TRELEGY ELLIPTA 200-62.5-25 MCG/INH AEROSOL POWDER, BREATH ACTIVATED       albuterol 108 (90 Base) MCG/ACT Aero Soln inhalation aerosol Inhale 2 Puffs every 6 hours as needed for Shortness of Breath.      VIMPAT 100 MG Tab tablet TAKE ONE TABLET BY MOUTH EVERY DAY 90 Tablet 1    VIMPAT 200 MG Tab tablet TAKE ONE TABLET BY MOUTH TWICE A  Tablet 1     No current Epic-ordered facility-administered medications on file.        Past Surgical History:   Procedure Laterality Date    PROSTATECTOMY ROBOTIC  5/29/2012    Performed by JACOB RAMOS at SURGERY [facility] ORS    COLONOSCOPY  8/10    grade 1 int. hemorrhoids        Allergies:  Patient has no known allergies.    Health Maintenance: Completed    ROS:   Review of Systems   Constitutional: Negative.  Negative for fever and malaise/fatigue.   HENT: Negative.          Enlarged uvula   Eyes: Negative.    Respiratory:  Negative for cough, sputum production and shortness of breath.    Cardiovascular:  Negative for chest pain, palpitations and leg swelling.   Gastrointestinal: Negative.    Genitourinary: Negative.    Musculoskeletal: Negative.    Neurological:  Positive for tremors and seizures.   Endo/Heme/Allergies: Negative.    Psychiatric/Behavioral: Negative.         Objective:     Exam: BP (!) 142/74 (BP Location: Left arm, Patient Position: Sitting, BP Cuff Size: Small adult)   Pulse (!) 59   Temp 36.9 °C (98.5 °F) (Temporal)   Resp 16   Ht 1.727 m (5' 8\")   Wt 83.9 kg (185 lb)   SpO2 98%  Body mass index is 28.13 kg/m².    Physical Exam  Constitutional:       Appearance: Normal appearance.   HENT:      Mouth/Throat:        Comments: Uvula midline, elongated  Cardiovascular:      Rate and Rhythm: Normal rate and regular rhythm.      Pulses: Normal pulses.      Heart sounds: Normal heart sounds.   Pulmonary:      Effort: Pulmonary effort " is normal.      Breath sounds: Normal breath sounds.   Musculoskeletal:         General: Normal range of motion.      Cervical back: Normal range of motion and neck supple.      Right lower leg: No edema.      Left lower leg: No edema.   Skin:     General: Skin is warm and dry.   Neurological:      General: No focal deficit present.      Mental Status: He is alert and oriented to person, place, and time.   Psychiatric:         Mood and Affect: Mood normal.         Behavior: Behavior normal.         Thought Content: Thought content normal.         Judgment: Judgment normal.       Assessment & Plan:   62 y.o. male with the following -    Problem List Items Addressed This Visit       Dyslipidemia      Latest Reference Range & Units 11/11/21 07:08   Cholesterol,Tot 100 - 199 mg/dL 268 (H)   Triglycerides 0 - 149 mg/dL 106   HDL >39 mg/dL 60   LDL Chol Calc (NIH) 0 - 99 mg/dL 189 (H)   VLDL Cholesterol Calc 5 - 40 mg/dL 19   The 10-year ASCVD risk score (Dany BLEDSOE Jr., et al., 2013) is: 14.5%  - Continue Pravastatin 20mg QD  - monitor annual fasting lipid         Relevant Medications    pravastatin (PRAVACHOL) 20 MG Tab    Other Relevant Orders    Lipid Profile    COPD (chronic obstructive pulmonary disease) (HCC)     Symptoms managed on current dosing. Continue plan per allergist  Daily Trelegy 200 inhaler; prn albuterol         History of prostate cancer     Monitor annual PSA; refer back to urology if urinary symptoms occur. At this time he denies nocturia, hematuria, dysuria, incontinence         Relevant Orders    PROSTATE SPECIFIC AG SCREENING    Partial epilepsy with impairment of consciousness (HCC)     Management per neurology  Vimpat, Aptiom         Vitamin deficiency      Latest Reference Range & Units 3/31/21 12:10 11/11/21 07:08   25-Hydroxy   Vitamin D 25 30.0 - 100.0 ng/mL 22 (L) 47.1   - monitor vit D level annually         Relevant Orders    VITAMIN D,25 HYDROXY (DEFICIENCY)    Elevated fasting glucose      Elevated fasting BG  - watch sugar intake  - check A1c with labs         Relevant Orders    HEMOGLOBIN A1C     Other Visit Diagnoses       Need for vaccination        I have placed the below orders and discussed them with an approved delegating provider.The MA is performing the below orders under the direction of Dr Herrera    Relevant Orders    Pneumococcal Conjugate Vaccine 20-Valent (19 yrs+) (Completed)    Swollen uvula        Snoring, enlarged uvula sometimes causes choking. Pending VNS eval for seizures. Per pt neurology wants him to see ENT re:enlarged uvula    Relevant Orders    Referral to ENT    Colon cancer screening        Relevant Orders    Referral to GI for Colonoscopy    Encounter to establish care        Due for annual labs; PNA vaccine administered today; referral to ENT, GI-colonoscopy placed        Medications Prescribed Today:  1. Dyslipidemia  - pravastatin (PRAVACHOL) 20 MG Tab; Take 1 Tablet by mouth every evening.  Dispense: 90 Tablet; Refill: 3    Educated in proper administration of medication(s) ordered today including safety, possible SE, risks, benefits, rationale and alternatives to therapy.     Return in about 6 months (around 3/21/2023) for Annual Visit.    Please note that this dictation was created using voice recognition software. I have made every reasonable attempt to correct obvious errors, but I expect that there are errors of grammar and possibly content that I did not discover before finalizing the note.

## 2022-09-23 PROBLEM — E56.9 VITAMIN DEFICIENCY: Status: ACTIVE | Noted: 2022-09-23

## 2022-09-23 PROBLEM — R73.01 ELEVATED FASTING GLUCOSE: Status: ACTIVE | Noted: 2022-09-23

## 2022-09-23 ASSESSMENT — ENCOUNTER SYMPTOMS
SHORTNESS OF BREATH: 0
TREMORS: 1
GASTROINTESTINAL NEGATIVE: 1
PSYCHIATRIC NEGATIVE: 1
CONSTITUTIONAL NEGATIVE: 1
EYES NEGATIVE: 1
COUGH: 0
MUSCULOSKELETAL NEGATIVE: 1
FEVER: 0
SEIZURES: 1
PALPITATIONS: 0
SPUTUM PRODUCTION: 0

## 2022-09-24 ENCOUNTER — HOSPITAL ENCOUNTER (OUTPATIENT)
Dept: PULMONOLOGY | Facility: MEDICAL CENTER | Age: 62
End: 2022-09-24
Attending: STUDENT IN AN ORGANIZED HEALTH CARE EDUCATION/TRAINING PROGRAM
Payer: COMMERCIAL

## 2022-09-24 DIAGNOSIS — J44.9 CHRONIC OBSTRUCTIVE PULMONARY DISEASE, UNSPECIFIED COPD TYPE (HCC): ICD-10-CM

## 2022-09-24 DIAGNOSIS — R06.02 SHORTNESS OF BREATH: ICD-10-CM

## 2022-09-24 PROCEDURE — 94729 DIFFUSING CAPACITY: CPT

## 2022-09-24 PROCEDURE — 94726 PLETHYSMOGRAPHY LUNG VOLUMES: CPT

## 2022-09-24 PROCEDURE — 94010 BREATHING CAPACITY TEST: CPT

## 2022-09-24 PROCEDURE — 99999 PR NO CHARGE: CPT | Performed by: STUDENT IN AN ORGANIZED HEALTH CARE EDUCATION/TRAINING PROGRAM

## 2022-09-24 ASSESSMENT — PULMONARY FUNCTION TESTS
FVC_PERCENT_PREDICTED: 97
FEV1: 3.1
FEV1/FVC_PERCENT_PREDICTED: 77
FVC_LLN: 3.52
FEV1/FVC_PERCENT_PREDICTED: 97
FVC: 4.13
FEV1/FVC_PERCENT_PREDICTED: 98
FEV1_LLN: 2.72
FVC_PREDICTED: 4.22
FEV1/FVC: 75
FEV1/FVC: 75
FEV1/FVC_PERCENT_LLN: 65
FEV1_PERCENT_PREDICTED: 95
FEV1_PREDICTED: 3.26
FEV1/FVC_PREDICTED: 77

## 2022-09-24 NOTE — ASSESSMENT & PLAN NOTE
Monitor annual PSA; refer back to urology if urinary symptoms occur. At this time he denies nocturia, hematuria, dysuria, incontinence

## 2022-09-24 NOTE — ASSESSMENT & PLAN NOTE
Latest Reference Range & Units 11/11/21 07:08   Cholesterol,Tot 100 - 199 mg/dL 268 (H)   Triglycerides 0 - 149 mg/dL 106   HDL >39 mg/dL 60   LDL Chol Calc (NIH) 0 - 99 mg/dL 189 (H)   VLDL Cholesterol Calc 5 - 40 mg/dL 19     The 10-year ASCVD risk score (Dany BLEDSOE Jr., et al., 2013) is: 14.5%  - Continue Pravastatin 20mg QD  - monitor annual fasting lipid

## 2022-09-24 NOTE — ASSESSMENT & PLAN NOTE
Symptoms managed on current dosing. Continue plan per allergist  Daily Trelegy 200 inhaler; prn albuterol

## 2022-09-24 NOTE — ASSESSMENT & PLAN NOTE
Latest Reference Range & Units 3/31/21 12:10 11/11/21 07:08   25-Hydroxy   Vitamin D 25 30.0 - 100.0 ng/mL 22 (L) 47.1     - monitor vit D level annually

## 2022-09-25 NOTE — PROCEDURES
DATE OF SERVICE:  09/24/2022     PULMONARY FUNCTION TEST INTERPRETATION     DATE OF STUDY:  09/24/2022     REFERRING PHYSICIAN:  Fredy Franklin MD.     INTERPRETING PHYSICIAN:  Anamika Nñio MD     REASON FOR STUDY:  COPD, shortness of breath.     STUDY ADEQUACY:  Good efforts, the patient met ATS standards by flow volume   loop and expiratory time.     RESULTS:  SPIROMETRY;    FVC is 4.13, which is 97% predicted.    FEV1 3.10, which is 95% predicted.    FEV1/FVC is 75.     LUNG VOLUMES:    TLC is 7.08, which is 107% predicted.    RV is 3.10, which is 141% predicted.     DIFFUSION CAPACITY:    DLCO is 26.63, which is 104% predicted.    DL/VA is 4.79, which is 123% predicted.     INTERPRETATION:    1.  Spirometry is normal.  2.  No bronchodilator testing was performed.  3.  The flow volume loop is consistent with the spirometry data.  4.  Lung volumes indicate hyperinflation without air trapping.  5.  Diffusion capacity is normal.  6.  There are no prior studies for comparison.        ______________________________  MD ROSELIA Billings/LEN/JOSUE    DD:  09/25/2022 12:36  DT:  09/25/2022 13:35    Job#:  [id number]

## 2022-09-27 RX ORDER — ALBUTEROL SULFATE 90 UG/1
2 AEROSOL, METERED RESPIRATORY (INHALATION) EVERY 6 HOURS PRN
Qty: 8.5 G | Refills: 0 | Status: SHIPPED | OUTPATIENT
Start: 2022-09-27 | End: 2022-10-12 | Stop reason: SDUPTHER

## 2022-09-28 NOTE — TELEPHONE ENCOUNTER
Patient mentioned at his last visit Allergist manages inhaler. Please let him know I received request from his pharmacy for refill on albuterol. I have sent in 1 refill till he is able to get with allergist.

## 2022-10-11 DIAGNOSIS — G40.209 PARTIAL EPILEPSY WITH IMPAIRMENT OF CONSCIOUSNESS (HCC): ICD-10-CM

## 2022-10-11 RX ORDER — ALBUTEROL SULFATE 90 UG/1
2 AEROSOL, METERED RESPIRATORY (INHALATION) EVERY 6 HOURS PRN
Qty: 8.5 G | Refills: 0 | OUTPATIENT
Start: 2022-10-11

## 2022-10-12 ENCOUNTER — TELEPHONE (OUTPATIENT)
Dept: NEUROLOGY | Facility: MEDICAL CENTER | Age: 62
End: 2022-10-12
Payer: MEDICARE

## 2022-10-12 DIAGNOSIS — G40.209 PARTIAL EPILEPSY WITH IMPAIRMENT OF CONSCIOUSNESS (HCC): ICD-10-CM

## 2022-10-12 RX ORDER — ALBUTEROL SULFATE 90 UG/1
2 AEROSOL, METERED RESPIRATORY (INHALATION) EVERY 6 HOURS PRN
Qty: 8.5 G | Refills: 2 | Status: SHIPPED | OUTPATIENT
Start: 2022-10-12 | End: 2023-01-05

## 2022-10-12 NOTE — TELEPHONE ENCOUNTER
Lacosamide (Vimpat) 100mg Tablet    RTS -  NEXT RFL [id number] DAYS TO RFL 37 LAST FILL 091222 VIA RETAIL FOR EMRG OVD, SCC=13 PER [facility] DISCRETION  20221118 - 10/12/2022 9:54am    Lacosamide (Vimpat) 200mg Tablet    RTS -  NEXT RFL [id number] DAYS TO RFL 37 LAST FILL 091222 VIA RETAIL FOR EMRG OVD, SCC=13 PER [facility] DISCRETION  [id number] - 10/12/2022 9:57am

## 2022-10-12 NOTE — TELEPHONE ENCOUNTER
Received request via: Patient    Was the patient seen in the last year in this department? Yes    Does the patient have an active prescription (recently filled or refills available) for medication(s) requested? No    Pt states he always got the refill from PCP

## 2022-10-14 ENCOUNTER — TELEPHONE (OUTPATIENT)
Dept: NEUROLOGY | Facility: MEDICAL CENTER | Age: 62
End: 2022-10-14
Payer: MEDICARE

## 2022-10-25 DIAGNOSIS — G40.209 PARTIAL EPILEPSY WITH IMPAIRMENT OF CONSCIOUSNESS (HCC): ICD-10-CM

## 2022-10-25 RX ORDER — LACOSAMIDE 100 MG/1
100 TABLET ORAL
Qty: 90 TABLET | Refills: 3 | Status: SHIPPED | OUTPATIENT
Start: 2022-10-25 | End: 2022-10-31 | Stop reason: SDUPTHER

## 2022-10-25 RX ORDER — LACOSAMIDE 200 MG/1
200 TABLET ORAL 2 TIMES DAILY
Qty: 180 TABLET | Refills: 3 | Status: SHIPPED | OUTPATIENT
Start: 2022-10-25 | End: 2022-10-31 | Stop reason: SDUPTHER

## 2022-10-31 DIAGNOSIS — G40.209 PARTIAL EPILEPSY WITH IMPAIRMENT OF CONSCIOUSNESS (HCC): ICD-10-CM

## 2022-10-31 RX ORDER — LACOSAMIDE 100 MG/1
100 TABLET ORAL
Qty: 90 TABLET | Refills: 3 | Status: SHIPPED | OUTPATIENT
Start: 2022-10-31 | End: 2022-12-05 | Stop reason: SDUPTHER

## 2022-10-31 RX ORDER — LACOSAMIDE 200 MG/1
200 TABLET ORAL 2 TIMES DAILY
Qty: 180 TABLET | Refills: 3 | Status: SHIPPED | OUTPATIENT
Start: 2022-10-31 | End: 2022-12-05 | Stop reason: SDUPTHER

## 2022-11-02 ENCOUNTER — PATIENT MESSAGE (OUTPATIENT)
Dept: HEALTH INFORMATION MANAGEMENT | Facility: OTHER | Age: 62
End: 2022-11-02

## 2022-11-03 ENCOUNTER — HOSPITAL ENCOUNTER (OUTPATIENT)
Dept: LAB | Facility: MEDICAL CENTER | Age: 62
End: 2022-11-03
Attending: NURSE PRACTITIONER
Payer: COMMERCIAL

## 2022-11-03 DIAGNOSIS — R73.01 ELEVATED FASTING GLUCOSE: ICD-10-CM

## 2022-11-03 DIAGNOSIS — Z85.46 HISTORY OF PROSTATE CANCER: ICD-10-CM

## 2022-11-03 DIAGNOSIS — E78.5 DYSLIPIDEMIA: ICD-10-CM

## 2022-11-03 DIAGNOSIS — E56.9 VITAMIN DEFICIENCY: ICD-10-CM

## 2022-11-03 LAB
25(OH)D3 SERPL-MCNC: 26 NG/ML (ref 30–100)
CHOLEST SERPL-MCNC: 244 MG/DL (ref 100–199)
EST. AVERAGE GLUCOSE BLD GHB EST-MCNC: 103 MG/DL
FASTING STATUS PATIENT QL REPORTED: NORMAL
HBA1C MFR BLD: 5.2 % (ref 4–5.6)
HDLC SERPL-MCNC: 57 MG/DL
LDLC SERPL CALC-MCNC: 169 MG/DL
PSA SERPL-MCNC: <0.02 NG/ML (ref 0–4)
TRIGL SERPL-MCNC: 91 MG/DL (ref 0–149)

## 2022-11-03 PROCEDURE — 80061 LIPID PANEL: CPT

## 2022-11-03 PROCEDURE — 82306 VITAMIN D 25 HYDROXY: CPT | Mod: GA

## 2022-11-03 PROCEDURE — 84153 ASSAY OF PSA TOTAL: CPT | Mod: GA

## 2022-11-03 PROCEDURE — 83036 HEMOGLOBIN GLYCOSYLATED A1C: CPT | Mod: GA

## 2022-11-03 PROCEDURE — 36415 COLL VENOUS BLD VENIPUNCTURE: CPT | Mod: GA

## 2022-11-28 ENCOUNTER — TELEPHONE (OUTPATIENT)
Dept: NEUROLOGY | Facility: MEDICAL CENTER | Age: 62
End: 2022-11-28
Payer: MEDICARE

## 2022-11-28 NOTE — TELEPHONE ENCOUNTER
Established Patient     [facility] Patient is checked in Patient Demographics? Yes    Is visit type and length correct?  Yes    Is referral attached to visit? Yes    Were records received from referring provider? No, Established patient with provider.    Patient was not contacted to have someone accompany them to visit?    Is this appointment scheduled as a Hospital Follow-Up?  No    Does the patient require any pre procedure or post procedure follow up? No    If any orders were placed at last visit or intended to be done for this visit do we have Results/Consult Notes? Yes, polysomnography was not completed and left message for patient to schedule to get the polysomnography completed prior to appointment.   Labs - Labs ordered, NOT completed. Patient advised to complete prior to next appointment.  Note: If patient appointment is for lab review and patient did not complete labs, check with provider if OK to reschedule patient until labs completed.  Imaging - Imaging was not ordered at last office visit.  Referrals - No referrals were ordered at last office visit.        10.  If patient appointment is for Botox - is order pended for provider? No

## 2022-11-30 ENCOUNTER — HOSPITAL ENCOUNTER (OUTPATIENT)
Dept: CARDIOLOGY | Facility: MEDICAL CENTER | Age: 62
End: 2022-11-30
Attending: STUDENT IN AN ORGANIZED HEALTH CARE EDUCATION/TRAINING PROGRAM
Payer: COMMERCIAL

## 2022-11-30 LAB — EKG IMPRESSION: NORMAL

## 2022-11-30 PROCEDURE — 93005 ELECTROCARDIOGRAM TRACING: CPT | Performed by: STUDENT IN AN ORGANIZED HEALTH CARE EDUCATION/TRAINING PROGRAM

## 2022-11-30 PROCEDURE — 93010 ELECTROCARDIOGRAM REPORT: CPT | Performed by: INTERNAL MEDICINE

## 2022-12-01 ENCOUNTER — TELEPHONE (OUTPATIENT)
Dept: HEALTH INFORMATION MANAGEMENT | Facility: OTHER | Age: 62
End: 2022-12-01
Payer: MEDICARE

## 2022-12-04 ENCOUNTER — SLEEP STUDY (OUTPATIENT)
Dept: SLEEP MEDICINE | Facility: MEDICAL CENTER | Age: 62
End: 2022-12-04
Attending: STUDENT IN AN ORGANIZED HEALTH CARE EDUCATION/TRAINING PROGRAM
Payer: COMMERCIAL

## 2022-12-04 DIAGNOSIS — Z91.89 AT RISK FOR SLEEP APNEA: ICD-10-CM

## 2022-12-04 DIAGNOSIS — G47.30 SLEEP APNEA, UNSPECIFIED TYPE: ICD-10-CM

## 2022-12-04 DIAGNOSIS — G47.19 EXCESSIVE DAYTIME SLEEPINESS: ICD-10-CM

## 2022-12-04 DIAGNOSIS — R06.83 SNORING: ICD-10-CM

## 2022-12-04 PROCEDURE — 95810 POLYSOM 6/> YRS 4/> PARAM: CPT | Performed by: STUDENT IN AN ORGANIZED HEALTH CARE EDUCATION/TRAINING PROGRAM

## 2022-12-05 ENCOUNTER — OFFICE VISIT (OUTPATIENT)
Dept: NEUROLOGY | Facility: MEDICAL CENTER | Age: 62
End: 2022-12-05
Attending: STUDENT IN AN ORGANIZED HEALTH CARE EDUCATION/TRAINING PROGRAM
Payer: COMMERCIAL

## 2022-12-05 VITALS
OXYGEN SATURATION: 97 % | SYSTOLIC BLOOD PRESSURE: 126 MMHG | TEMPERATURE: 97.7 F | BODY MASS INDEX: 28.26 KG/M2 | DIASTOLIC BLOOD PRESSURE: 68 MMHG | HEART RATE: 64 BPM | WEIGHT: 185.85 LBS

## 2022-12-05 DIAGNOSIS — G25.1 TREMOR DUE TO MULTIPLE DRUGS: ICD-10-CM

## 2022-12-05 DIAGNOSIS — R06.83 SNORING: ICD-10-CM

## 2022-12-05 DIAGNOSIS — R56.9 SEIZURES (HCC): ICD-10-CM

## 2022-12-05 DIAGNOSIS — G25.2 CEREBELLAR TREMOR: ICD-10-CM

## 2022-12-05 DIAGNOSIS — G47.19 EXCESSIVE DAYTIME SLEEPINESS: ICD-10-CM

## 2022-12-05 DIAGNOSIS — J44.9 CHRONIC OBSTRUCTIVE PULMONARY DISEASE, UNSPECIFIED COPD TYPE (HCC): ICD-10-CM

## 2022-12-05 DIAGNOSIS — G25.2 KINETIC TREMOR: ICD-10-CM

## 2022-12-05 DIAGNOSIS — E78.5 DYSLIPIDEMIA: ICD-10-CM

## 2022-12-05 DIAGNOSIS — G40.209 PARTIAL EPILEPSY WITH IMPAIRMENT OF CONSCIOUSNESS (HCC): ICD-10-CM

## 2022-12-05 DIAGNOSIS — R25.1 TREMOR: ICD-10-CM

## 2022-12-05 DIAGNOSIS — G47.30 SLEEP APNEA, UNSPECIFIED TYPE: ICD-10-CM

## 2022-12-05 DIAGNOSIS — K13.79 HYPERTROPHY OF UVULA: ICD-10-CM

## 2022-12-05 PROCEDURE — G2212 PROLONG OUTPT/OFFICE VIS: HCPCS | Performed by: STUDENT IN AN ORGANIZED HEALTH CARE EDUCATION/TRAINING PROGRAM

## 2022-12-05 PROCEDURE — 99215 OFFICE O/P EST HI 40 MIN: CPT | Performed by: STUDENT IN AN ORGANIZED HEALTH CARE EDUCATION/TRAINING PROGRAM

## 2022-12-05 PROCEDURE — 99212 OFFICE O/P EST SF 10 MIN: CPT | Performed by: STUDENT IN AN ORGANIZED HEALTH CARE EDUCATION/TRAINING PROGRAM

## 2022-12-05 RX ORDER — LACOSAMIDE 100 MG/1
100 TABLET ORAL DAILY
Qty: 90 TABLET | Refills: 1 | Status: SHIPPED | OUTPATIENT
Start: 2022-12-05 | End: 2023-02-17 | Stop reason: SDUPTHER

## 2022-12-05 RX ORDER — LACOSAMIDE 200 MG/1
TABLET ORAL
Qty: 180 TABLET | Refills: 1 | Status: SHIPPED | OUTPATIENT
Start: 2022-12-05 | End: 2023-02-17 | Stop reason: SDUPTHER

## 2022-12-05 RX ORDER — ZONISAMIDE 50 MG/1
50 CAPSULE ORAL DAILY
Qty: 90 CAPSULE | Refills: 1 | Status: SHIPPED
Start: 2022-12-05 | End: 2023-02-18

## 2022-12-05 ASSESSMENT — FIBROSIS 4 INDEX: FIB4 SCORE: 0.79

## 2022-12-05 NOTE — PROGRESS NOTES
NEUROLOGY FOLLOW-UP - 12/05/2022   REFERRING PROVIDER  Fredy Franklin M.D.  [address]  [place] [address]    PCP  Sheri Weathers   [phone]   [facility] [[id number]]     REASON FOR VISIT: Fredy Zavala 62 y.o. male presents today for follow-up.       INTERVAL HISTORY:  Patient's PMH, PSH, FH, and SH were reviewed.    Patient reports he is doing better in terms of his seizures. They are fewer overall and The changes made last visit which included dividing the one time daily dose of 1600 mg of aptiom which was causes side effects of dizziness, fatigue, incoordination into divided dose of 800 mg BID has help mitigate the side effects. In fact he and his wife note that he is Much more alert and awake which he attributes to both less frequent seizures and experiencing less drug side effects    Patient underwent sleep study but formal interpretation is still pending. On my review he has AHI significantly greater than 5 which is diagnostic of sleep apnea and warrants treatment. Patient reports that he has had chronic issues with choking on his uvula which is quite large and often becomes swollen. Discussed surgical options such as uvulectomy and/or plasty as well as implantable devices to stimulate the tongue in order to prevent it from slide back into the pharynx and b=obstruction airflow. He is interested in the former option.    Ultimately we both agreed that VNS is not a safe option in light of his sleep apnea in particular. Did discuss other options such as RNS but he did not seem keen on pursing that .    Current AEDs  Vimpat 200/300  Aptiom 1600 total daily dose (divided 800 mg BID to mitigate side effects)    Shaking/tremor has improved  a little bit with taking him off his prior AED, Depakote, but tremor stills are severe enough where he has difficulty writing legibly  and eating meals or drinking results in spilling,  dropping ofoods  Seizures are occurrring with less frewquency,  once per week.        CURRENT MEDICATIONS AT THE TIME OF THIS ENCOUNTER:    Current Outpatient Medications:     zonisamide, 50 mg, Oral, DAILY    eslicarbazepine, TAKE TWO TABLETS BY MOUTH EVERY EVENING    lacosamide, 100 mg, Oral, DAILY    lacosamide, Take 200 mg in the morning, 200 mg + 100 mg in the evening.    albuterol, 2 Puff, Inhalation, Q6HRS PRN, Taking    pravastatin, 20 mg, Oral, Nightly, Taking    Trelegy Ellipta, , Taking     EXAM:   Encounter Vitals  /68 (BP Location: Left arm, Patient Position: Sitting, BP Cuff Size: Adult)   Pulse 64   Temp 36.5 °C (97.7 °F) (Temporal)   Wt 84.3 kg (185 lb 13.6 oz)   SpO2 97%            Physical Exam:  Physical Exam  Constitutional:       General: He is not in acute distress.  HENT:      Head: Normocephalic.      Nose: Nose normal.      Mouth/Throat:      Pharynx: Uvula swelling present.      Comments: Enlarged tongue and marked enlarged and long uvula with minor swelling  Neurological:      Motor: Motor strength is normal.      Neurological Exam   Neurological Exam  Mental Status  Awake, alert and oriented to person, place and time.    Motor   Strength is 5/5 throughout all four extremities.    Coordination  Right: Finger-to-nose abnormality:Left: Finger-to-nose abnormality:  Mild dysmetria on finger-nose-finger.    Gait  Casual gait is normal including stance, stride, and arm swing.     ALL DATA (I.e. labs, procedures, imaging, reports, clinical notes, etc.) FROM [facility] AND/OR OUTSIDE SOURCES, IF AVAILABLE, PERSONALLY REVIEWED:       ASSESSMENT, EDUCATION, AND COUNSELING:  This is a 62 y.o. male patient who presents to the neurology clinic. We had an extensive discussion about the patient's symptoms, signs, and work-up to date, if any. We discussed potential and/or definitive diagnoses, work-up, and potential treatments.       PLAN:  If applicable, the work-up such as labs, imaging, procedures, and/or other testing, referrals, and/or recommended treatment  strategies are listed below.    Visit Diagnoses     ICD-10-CM   1. Partial epilepsy with impairment of consciousness ([facility])  G40.209   2. Sleep apnea, unspecified type  G47.30   3. 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 Dyslipidemia  E78.5   6. Chronic obstructive pulmonary disease, unspecified COPD type ([facility])  J44.9   7. Excessive daytime sleepiness  G47.19   8. Snoring  R06.83   9. 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 Tremor  R25.1        Orders Placed This Encounter    Referral to ENT    zonisamide (ZONEGRAN) 50 MG capsule    eslicarbazepine (APTIOM) 800 MG Tab    lacosamide (VIMPAT) 100 MG Tab tablet    lacosamide (VIMPAT) 200 MG Tab tablet        Patient with refractory epilepsy who is benefiting from new medications and adjustments to the timing and dosing of the regimen which included vimpat 200/300 and aptiom 800 BID though he still has a couple of small focal aware seizures per month that are not disabling or particularly distressing. We agreed that pursuing VNS is not in his best interest for several reasons but the main one I am concern about is the fact that he has sleep apnea, possibly severe, which the VNS would make worse. He needs to follow-up with sleep medicine to see about getting a couple mask for him to try. I will also refer him to ENT for considerate of surgical treatment given his abnormally long uvula which he report she sometimes chokes on and which he thinks may be what is most contributory to his apnea. Referral to ENT placed.      Discussed several options to address his tremor which, although mildly improved, still is disabling to him. Tremor is most consistent with essential tremor as it is activated with fine dexterity movements of his hand like writing, it shows bilateral intention component on finger nose finger, and his archimedes spiral drawing is very consistent with essential tremor Ultimately,  we elected to try a very low dose of zonisamide which has been used with some success off label use of various tremors including but not limited to essential tremor. It was also provide additional anticonvulsant effects.Third, it is an appetite suppressant and well known to cause weight loss, which the patient plans on working on to address his sleep apnea.    Other considerations for tremor treatments include weighted utensils.            BILLING DOCUMENTATION:     I spent a total of I spent a total of 85 minutes on the day of the visit.    minutes of face-to-face time in this visit. Over 50% of the time of the visit today was spent on counseling and/or coordination of care wtih the patient and/or family, as above in assessment in plan.    Fredy Franklin MD  Epilepsy and General Neurology  Department of Neurology  Clinical  of Neurology [facility].

## 2022-12-07 NOTE — PROCEDURES
MONTAGE: Extra EEG leads  STUDY TYPE: Diagnostic  RECORDING TECHNIQUE:   After the scalp was prepared, gold plated electrodes were applied to the scalp according to the International 10-20 System. EEG (electroencephalogram) was continuously monitored from the O1-M2, O2-M1, C3-M2, C4-M1, F3-M2, and F4-M1. EOGs (electrooculograms) were monitored by electrodes placed at the left and right outer canthi. Chin EMG (electromyogram) was monitored by electrodes placed on the mentalis and sub-mentalis muscles. Nasal and oral airflow were monitored using a triple port thermocouple as well as oronasal pressure transducer. Respiratory effort was measured by inductive plethysmography technology employing abdominal and thoracic belts. Blood oxygen saturation and pulse were monitored by pulse oximetry. Heart rhythm was monitored by surface electrocardiogram. Leg EMG was studied using surface electrodes placed on left and right anterior tibialis. A microphone was used to monitor tracheal sounds and snoring. Body position was monitored and documented by technician observation.   SCORING CRITERIA:   A modification of the AASM manual for scoring of sleep and associated events was used. Obstructive apneas were scored by cessation of airflow for at least 10 seconds with continuing respiratory effort. Central apneas were scored by cessation of airflow for at least 10 seconds with no respiratory effort. Hypopneas were scored by a 30% or more reduction in airflow for at least 10 seconds accompanied by arterial oxygen desaturation of 3% or an arousal. For CMS (Medicare) patients, per AASM rule 1B, hypopneas are scored by 30% with mild reduction in airflow for at least 10 seconds accompanied by arterial saturation decreased at 4%.    Study start time was 09:22:59 PM. Diagnostic recording time was 8h 52.5m with a total sleep time of 5h 34.0m resulting in a sleep efficiency of 62.72%%. Sleep latency from the start of the study was 36 minutes  and the latency from sleep to REM was 369 minutes. In total,106 arousals were scored for an arousal index of 19.0.  Respiratory:  There were a total of 26 apneas consisting of 26 obstructive apneas, 0 mixed apneas, and 0 central apneas. A total of 101 hypopneas were scored. The apnea index was 4.67 per hour and the hypopnea index was 18.14 per hour resulting in an overall AHI of 22.81. AHI during REM was 44.8 and AHI while supine was 24.62.  Oximetry:  There was a mean oxygen saturation of 94.0%. The minimum oxygen saturation in NREM was 81.0 % and in REM was 82.0%. The patient spent 13.0 minutes of TST with SaO2 <88%.  Cardiac:  The highest heart rate seen while awake was 104 BPM while the highest heart rate during sleep was 83 BPM with an average sleeping heart rate of 56 BPM.  Limb Movements:  There were a total of 39 PLMs during sleep which resulted in a PLMS index of 7.0. Of these, 36 were associated with arousals which resulted in a PLMS arousal index of 6.5.    Impression:  1.  Moderate obstructive sleep apnea with overall AHI 22.8, respiratory vents worse during REM sleep with a REM AHI of 44.8  2.  Mild nocturnal hypoxia likely secondary to untreated sleep apnea minimum oxygen saturation 81%, time at or below 88% saturation 13 minutes  3.  Patient was briefly trialed on CPAP due to meeting split-night protocol, but refused to proceed with CPAP therapy during night of study  4.  No increased muscle activity seen during sleep or abnormal EEG activity detected    Recommendations:  I recommend that the patient should consider return for a CPAP/BiPAP titration due to the severity of sleep apnea.  May be a candidate for empiric home auto CPAP therapy      In some cases alternative treatment options may be proven effective in resolving sleep apnea. These options include upper airway surgery, hypoglossal nerve stimulator, the use of a dental orthotic, weight loss, or positional therapy. Clinical correlation is  required. In general patients with sleep apnea are advised to avoid alcohol, sedatives and not to operate a motor vehicle while drowsy.  Untreated sleep apnea increases the risk for cardiovascular and neurovascular disease.

## 2022-12-08 PROBLEM — G47.30 SLEEP APNEA: Status: ACTIVE | Noted: 2022-12-08

## 2022-12-08 PROBLEM — K13.79 HYPERTROPHY OF UVULA: Status: ACTIVE | Noted: 2022-12-08

## 2022-12-08 PROBLEM — G25.2 KINETIC TREMOR: Status: ACTIVE | Noted: 2022-12-08

## 2022-12-08 NOTE — PATIENT INSTRUCTIONS
NEUROLOGY CLINIC VISIT WITH DR. FRANKLIN [unfilled]       PATIENT EXPECTATIONS AND IMPORTANT APPOINTMENT REMINDERS:   You matter to Dr. Franklin and you deserve the best care. Dr. Franklin prides himself on providing the best possible care to all his patients and strives to make each appointment meaningful, ensuring that you - the patient - is one step closer towards improving your health and quality of life. In order to achieve this for every patient, Dr. Franklin requests that each patient take action prior to every appointment. Below is a list of important things that you-as a patient-can do to ensure that you get the most from each appointment with Dr. Franklin. Please read each item carefully. Thank you!    REFILLS:   Request refills AT LEAST 1 week in advance to ensure you do not run out of medications    [facility]  It is STRONGLY encouraged that ALL patients sign for and become comfortable with [facility]. It is BY FAR the fastest and most convenient way for both Dr. Franklin and patients to obtain timely refills.  If you are having trouble signing up or logging into your account, staff are available to help you.     ORDERS AND RESULTED STUDIES:   All labs and diagnostic test results will be reviewed at your next visit, UNLESS  Dr. Franklin determines that results urgent or emergent and need to be acted on sooner. Dr. Franklin will either call or send a message through [facility] if this is the case    BE PREPARED PRIOR TO YOUR APPOINTMENT:  Ensure that ALL test results that were completed outside of the [facility] system have been sent to [facility] PRIOR to your appointment with Dr. Franklin.    IMPORTANT: Ensure that ALL images (not just the reports) have been sent and uploaded to the [facility] system. Dr. Franklin reviews all images personally prior to each visit. Ensuring that ALL the test results and test images are accessible to Dr. Franklin prior to your appointment is YOUR responsibility.  Bring a government-issues picture ID  and an updated insurance card EVERY visit.  It is highly recommended that you bring a list of things of questions/concerns every visit to ensure the most important questions/concerns are addressed. While it may not be possible to address all items on the list in a single visit, having a list prepared will ensure that Dr. Franklin addresses the items that are most important to you    PAPERWORK, DOCUMENTATION, LETTER REQUESTS:  You must notify the office ahead of your appointment of all paperwork or letter requests.   Please DO NOT wait until the last minute to make these requests, as Dr. Franklin may not be able to complete the paperwork on the requests. Also, please give all paperwork to the medical assistant upon  appointment  check-in. Please note that Dr. Franklin may not be able complete some types of documentation during the appointment, which is why it is important to communicate paperwork requests prior to your appointment.     KNOWN YOUR MEDS:   AT EVERY SINGLE APPOINTMENT, please bring a list of every single prescribed, non-prescribed, and/or over the counter medication or supplements you are taking, including ones taken on a rare or intermittent basis:  Known the following information for each supplement or medication:  Name of medication  The strength of EACH pill/capsule   The number of pills/capsules per dose AND number of doses per day  EXAMPLE: Depakote 250 mg Extended Release Capsules: 2 pills (500 mg) taken at 8:00 AM and 3 pills (750 mg) are taken at 8:00 PM every day  If the medication is not taken on a daily basis, estimate how many days per week or days per month the medication is used  If you take a multivitamin, Dr. Franklin must know what is in it and how much of each vitamin is included.  DO NOT just print out your medication list from [employer] and bring it to the appointment, as this is often unreliable, inaccurate, or outdated.  Have a printed or written list in physically on you for Dr. Franklin  "(preferred), or bring in all the medication bottles physically with you to the appointment    ARRIVE EARLY FOR ALL VISITS:  Please note that we are unable to accommodate late arrivals as per office policy.  The patient (not parent, spouse, or friend) must be physically present at check-in no later than 12 minutes after the the scheduled appointment time, or you will be asked to reschedule.   Consider scheduling a virtual visit with Dr. Franklin through [employer] as an alternative if it is more convenient. This can only be done if you are already established with Dr. Franklin    COMMUNICATION URGENT AND NON-URGENT MATTERS:  Your concerns are important and deserve to be heard and addressed. If you have an urgent matter, there are two methods that will ensure your concerns are prioritized appropriately:   Preferred method: Sign-up/Login to your [employer] account and send a message addressed to Dr. Franklin or Margret David (Dr. Franklin's assistant). In the subject line, type \"urgent\" followed by a word or phrase describing the situation (For example, write \"Urgent: Out of antiseuzre med and need refill\" or \"Urgent: Severe side effects to new meds\". In doing this, our staff can ensure urgent messages are triaged appropriately and communicated to Dr. Franklin that day.  Call the [facility] main line at [phone]. Dr. Franklin's voicemail extension is [phone]. When leaving a voice message, specifically indicate if it is urgent (or non-urgent) so that the matter can be triaged appropriately.     Thank you for taking important action in your care!   "

## 2023-01-05 RX ORDER — ALBUTEROL SULFATE 90 UG/1
AEROSOL, METERED RESPIRATORY (INHALATION)
Qty: 8.5 G | Refills: 10 | Status: SHIPPED | OUTPATIENT
Start: 2023-01-05 | End: 2023-07-12 | Stop reason: SDUPTHER

## 2023-02-02 ENCOUNTER — TELEPHONE (OUTPATIENT)
Dept: SLEEP MEDICINE | Facility: MEDICAL CENTER | Age: 63
End: 2023-02-02
Payer: MEDICARE

## 2023-02-02 NOTE — TELEPHONE ENCOUNTER
SLEEP - NEW PATIENT CHART PREP COMPLETED ON 02/02/2023    SCHEDULED FOLLOW UP 02/03/2023    Ref by  Dr. Franklin Dx: At risk for sleep apnea , Excessive daytime sleepiness , Snoring    [facility] Sleep Study 12/04/2022    Referring OV Notes Reviewed : YES     Is Pt currently on PAP? NO .If yes, contact patient as a remind to bring device with them. N/A    Nocturnal Oxygen :NO . If yes, Liter Flow? N/A    Any prior Sleep Studies on file? YES . If yes, when?  12/04/2022    Previously seen with [facility]? NO If yes, when? N/A    Previous PMA Patient? NO

## 2023-02-03 ENCOUNTER — OFFICE VISIT (OUTPATIENT)
Dept: SLEEP MEDICINE | Facility: MEDICAL CENTER | Age: 63
End: 2023-02-03
Payer: MEDICARE

## 2023-02-03 VITALS
DIASTOLIC BLOOD PRESSURE: 84 MMHG | OXYGEN SATURATION: 98 % | HEIGHT: 68 IN | HEART RATE: 66 BPM | BODY MASS INDEX: 27.58 KG/M2 | SYSTOLIC BLOOD PRESSURE: 134 MMHG | RESPIRATION RATE: 16 BRPM | WEIGHT: 182 LBS

## 2023-02-03 DIAGNOSIS — G47.33 OSA (OBSTRUCTIVE SLEEP APNEA): ICD-10-CM

## 2023-02-03 DIAGNOSIS — R06.83 SNORING: ICD-10-CM

## 2023-02-03 DIAGNOSIS — G47.20 SLEEP STAGE DYSFUNCTION: ICD-10-CM

## 2023-02-03 PROCEDURE — 99204 OFFICE O/P NEW MOD 45 MIN: CPT | Performed by: PREVENTIVE MEDICINE

## 2023-02-03 ASSESSMENT — FIBROSIS 4 INDEX: FIB4 SCORE: 0.79

## 2023-02-03 NOTE — PROGRESS NOTES
"CHIEF COMPLIANT: \"My neurologist ordered my sleep study.  I am here to establish care.?\"     HISTORY OF PRESENT ILLNESS:  Fredy Zavala is a 62 y.o.male who is here for sleep study results.  He was referred by his neurologist Dr. Fredy Franklin.  This patient has history of snoring and enlarged uvula and epilepsy.    Collateral: Delaney     Sleep study results: ordered by PCP   TYPE: diagnostic   DATE: 12/04/22  Diagnostic:AHI:22.81; REM 44.8  Diagnostic  Oxygen Jeremy: 81% with 13mins at or under 88%     EPWORTH 2/24 not cw with EDS      Sleep History:  He reports that he has witnessed apneas and mild snoring. He goes to bed at 9pm and goes to sleep in 30mins and wakes up at 530am. He wakes up at least twice a night. He does wake up refreshed. He denies naps. He is a never smoker or cannabis use. He does not drink alcohol. He drinks 3 cups of coffee a day. He is disabled. He has never used any medications for sleep. He does have a lack of interest in sexual activities.     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 hemorrhoids     PAST FAMILY HISTORY:  Family History   Problem Relation Age of Onset    Diabetes Father     Alcohol/Drug Brother     Cancer Neg Hx     Stroke Neg Hx     Heart Disease Neg Hx      SOCIAL HISTORY:  Social History     Socioeconomic History    Marital status:      Spouse name: Not on file    Number of children: Not on file    Years of education: Not on file    Highest education level: 12th grade   Occupational History    Not on file   Tobacco Use    Smoking status: Never    Smokeless tobacco: Never   Vaping Use    Vaping Use: Never used   Substance and Sexual Activity    Alcohol use: Not Currently     Comment: rare    Drug use: Not Currently     Comment: previous    Sexual activity: Yes     Partners: Female   Other Topics Concern    Not on file   Social History Narrative    Retired      Social Determinants of Health     Financial Resource Strain: Low Risk     Difficulty of Paying Living Expenses: Not very hard   Food Insecurity: No Food Insecurity    Worried About Running Out of Food in the Last Year: Never true    Ran Out of Food in the Last Year: Never true   Transportation Needs: No Transportation Needs    Lack of Transportation (Medical): No    Lack of Transportation (Non-Medical): No   Physical Activity: Sufficiently Active    Days of Exercise per Week: 7 days    Minutes of Exercise per Session: 30 min   Stress: No Stress Concern Present    Feeling of Stress : Not at all   Social Connections: Socially Isolated    Frequency of Communication with Friends and Family: Once a week    Frequency of Social Gatherings with Friends and Family: Never    Attends [employer] Services: Never    Active Member of Clubs or Organizations: No    Attends Club or Organization Meetings: Never    Marital Status:    Intimate Partner Violence: Not on file   Housing Stability: Unknown    Unable to Pay for Housing in the Last Year: No    Number of Places Lived in the Last Year:  "Not on file    Unstable Housing in the Last Year: No     ALLERGIES: Patient has no known allergies.  MEDICATIONS:  Current Outpatient Medications   Medication Sig Dispense Refill    albuterol 108 (90 Base) MCG/ACT Aero Soln inhalation aerosol USE 2 INHALATIONS EVERY 6 HOURS AS NEEDED FOR SHORTNESS OF BREATH 8.5 g 10    zonisamide (ZONEGRAN) 50 MG capsule Take 1 Capsule by mouth every day for 180 days. 90 Capsule 1    eslicarbazepine (APTIOM) 800 MG Tab TAKE TWO TABLETS BY MOUTH EVERY EVENING 180 Tablet 3    lacosamide (VIMPAT) 100 MG Tab tablet Take 1 Tablet by mouth every day for 180 days. 90 Tablet 1    lacosamide (VIMPAT) 200 MG Tab tablet Take 200 mg in the morning, 200 mg + 100 mg in the evening. 180 Tablet 1    pravastatin (PRAVACHOL) 20 MG Tab Take 1 Tablet by mouth every evening. 90 Tablet 3    TRELEGY ELLIPTA 200-62.5-25 MCG/INH AEROSOL POWDER, BREATH ACTIVATED        No current facility-administered medications for this visit.    \"CURRENT RX\"    REVIEW OF SYSTEMS:  Constitutional: Denies weight loss, endorses chronic daytime fatigue  Eyes: Denies vision changes  Ears/Nose/Mouth/Throat: Denies rhinitis/nasal congestion, injury, decayed teeth/toothaches.  Cardiovascular: Denies chest pain, tightness, palpitations, swelling in legs/feet, difficulty breathing when lying down but gets better when sitting up.   Respiratory: Denies shortness of breath while awake,  Sleep: per HPI  Gastrointestinal: Denies  difficulty swallowing,  heartburn.  Genitourinary: REPORTS nocturia  Musculoskeletal: Denies painful joints, sore muscles, back pain.   Neurological: Denies frequent headaches,weakness, dizziness.    PHYSICAL EXAM/VITALS:  /84 (BP Location: Left arm, Patient Position: Sitting, BP Cuff Size: Adult)   Pulse 66   Resp 16   Ht 1.727 m (5' 8\")   Wt 82.6 kg (182 lb)   SpO2 98%   BMI 27.67 kg/m²   Appearance: Well-nourished, well-developed,  looks stated age, no acute distress  Eyes:   EOMI  ENMT: " MASKED  Neck: Supple, trachea midline  Respiratory effort:  No intercostal retractions or use of accessory muscles  Musculoskeletal:  Grossly normal; gait and station normal  Neurologic:  oriented to person, time, place, and purpose; judgement intact  Psychiatric:  No depression, anxiety, agitation      MEDICAL DECISION MAKING:  The medical record was reviewed as it pertains to this referral. This includes records from primary care,consultants notes, referral request, hospital records, labs and imaging. Any available diagnostic and titration nocturnal polysomnograms, home sleep apnea tests, continuous nocturnal oximetry results, multiple sleep latency tests, and recent compliance reports were reviewed with the patient.    ASSESSMENT/PLAN:  Fredy Zavala is a 62 y.o.male refused CPAP and wants to think about his options and make a decision at a later time. He will see an ENT Dr. Santoro to discuss possible ENT interventions. It was recommended to start PAP therapy.     1. LAURENCE (obstructive sleep apnea)    2. Sleep stage dysfunction    3. Snoring        The risks of untreated sleep apnea were discussed with the patient at length. Patients with LAURENCE are at increased risk of cardiovascular disease including coronary artery disease, systemic arterial hypertension, pulmonary arterial hypertension, cardiac arrhythmias, and stroke. LAURENCE patients have an increased risk of motor vehicle accidents, type 2 diabetes, chronic kidney disease, and non-alcoholic liver disease. The patient was advised to avoid driving a motor vehicle when drowsy.  Have advised the patient to follow up with the appropriate healthcare practitioners for all other medical problems and issues.    RETURN TO CLINIC: Return if symptoms worsen or fail to improve.    My total time spent caring for the patient on the day of the encounter was 40 minutes. This includes time spent on a thorough chart review including other physician notes, all sleep studies, as  well as critical labs and pulmonary and cardiac studies.  Additionally, it includes a thorough discussion of good sleep hygiene and stimulus control, as well as  the need for consistency in terms of sleep preparation and practice.    Please note that this dictation was created using voice recognition software.  I have made every reasonable attempt to correct obvious errors, I expect that there are errors of grammar and possibly content that I did not discover before finalizing this note.

## 2023-02-17 ENCOUNTER — TELEMEDICINE (OUTPATIENT)
Dept: NEUROLOGY | Facility: MEDICAL CENTER | Age: 63
End: 2023-02-17
Attending: STUDENT IN AN ORGANIZED HEALTH CARE EDUCATION/TRAINING PROGRAM
Payer: COMMERCIAL

## 2023-02-17 DIAGNOSIS — G25.1 TREMOR DUE TO MULTIPLE DRUGS: ICD-10-CM

## 2023-02-17 DIAGNOSIS — K13.79 HYPERTROPHY OF UVULA: ICD-10-CM

## 2023-02-17 DIAGNOSIS — E78.5 DYSLIPIDEMIA: ICD-10-CM

## 2023-02-17 DIAGNOSIS — G40.219 INTRACTABLE FOCAL EPILEPSY WITH IMPAIRMENT OF CONSCIOUSNESS (HCC): ICD-10-CM

## 2023-02-17 DIAGNOSIS — G47.30 SLEEP APNEA, UNSPECIFIED TYPE: ICD-10-CM

## 2023-02-17 DIAGNOSIS — G40.209 PARTIAL EPILEPSY WITH IMPAIRMENT OF CONSCIOUSNESS (HCC): ICD-10-CM

## 2023-02-17 DIAGNOSIS — J44.9 CHRONIC OBSTRUCTIVE PULMONARY DISEASE, UNSPECIFIED COPD TYPE (HCC): ICD-10-CM

## 2023-02-17 PROCEDURE — G2212 PROLONG OUTPT/OFFICE VIS: HCPCS | Mod: 95 | Performed by: STUDENT IN AN ORGANIZED HEALTH CARE EDUCATION/TRAINING PROGRAM

## 2023-02-17 PROCEDURE — 99215 OFFICE O/P EST HI 40 MIN: CPT | Mod: 95 | Performed by: STUDENT IN AN ORGANIZED HEALTH CARE EDUCATION/TRAINING PROGRAM

## 2023-02-17 RX ORDER — LACOSAMIDE 100 MG/1
100 TABLET ORAL DAILY
Qty: 30 TABLET | Refills: 5 | Status: SHIPPED | OUTPATIENT
Start: 2023-02-17 | End: 2023-06-19

## 2023-02-17 RX ORDER — LACOSAMIDE 200 MG/1
TABLET ORAL
Qty: 60 TABLET | Refills: 2 | Status: SHIPPED | OUTPATIENT
Start: 2023-02-17 | End: 2023-05-18

## 2023-02-17 RX ORDER — OMEPRAZOLE 40 MG/1
CAPSULE, DELAYED RELEASE ORAL
COMMUNITY
Start: 2023-02-09 | End: 2023-06-20

## 2023-02-17 ASSESSMENT — PATIENT HEALTH QUESTIONNAIRE - PHQ9: CLINICAL INTERPRETATION OF PHQ2 SCORE: 0

## 2023-02-18 NOTE — PROGRESS NOTES
Telemedicine: Established Patient   This evaluation was conducted via Zoom using secure and encrypted videoconferencing technology. The patient was in their home in the [facility].    The patient's identity was confirmed and verbal consent was obtained for this virtual visit.     NEUROLOGY FOLLOW-UP - 02/17/2023   REFERRING PROVIDER  No referring provider defined for this encounter.    PCP  Sheri Weathers   [phone]   [facility] [[id number]]     REASON FOR VISIT: Fredy Zavala 62 y.o. male presents today for follow-up.     SUMMARY OF PROBLEMS AND/OR DIAGNOSES AS PER MY PRIOR NOTES/ENCOUNTERS:    INTERVAL HISTORY:    I am meeting with Fredy again to follow-up on his refractory focal epilepsy.  His wife is also present.  Patient is overall doing okay in regards to his seizure control but when I questioned him and his wife about how frequently he still having seizures thinks that he is having them at least a couple times per week.  This is certainly fewer than what they used to be as he used to have them sometimes multiple times per day and most days of the week and they were more intense as well.  His wife has noticed that when they do happen they are very short and his postictal period is very brief as well.    Last visit I started him on zonisamide given that this is a medication that has off label benefit for tremors which she has been complaining of.  I also chose it as adjunct of antiepileptic medication.  He reports that he actually tried this medication in the past and he did not tolerate this at all.  He has been taking the 50 mg but does wish to stop it.  He he has not noticed any benefit to his tremors.  On further questioning while he has noticeable tremors he does not feel that they are of the severity that is impacting his quality life or functionality and so wishes to refrain from treating them at the moment with anything else unless they worsen.  Patient is otherwise tolerating  his Vimpat and Aptiom although there is some ongoing difficulty with getting the Aptiom      Current AED regimen:  Zonisamde 50  Vimpat 200/300  Aptiom 1600 mg QHS              Patient's PMH, PSH, FH, and SH were reviewed.    ROS: All review of systems complete and are negative except as documented    CURRENT MEDICATIONS AT THE TIME OF THIS ENCOUNTER:    Current Outpatient Medications:     [START ON 3/19/2023] Xcopri, Take 50 mg by mouth at bedtime for 14 days, THEN 100 mg at bedtime for 14 days. Indications: Partial Onset Seizure    Xcopri, Take 12.5 mg by mouth at bedtime for 14 days, THEN 25 mg at bedtime for 14 days. Indications: Partial Onset Seizure    omeprazole, , Taking    lacosamide, Take 200 mg in the morning, 200 mg + 100 mg in the evening.Take 200 mg in the morning, 200 mg + 100 mg in the evening.    lacosamide, 100 mg, Oral, DAILY    eslicarbazepine, TAKE TWO TABLETS BY MOUTH EVERY EVENINGTAKE    albuterol, USE 2 INHALATIONS EVERY 6 HOURS AS NEEDED FOR SHORTNESS OF BREATH, PRN    pravastatin, 20 mg, Oral, Nightly, Taking    Trelegy Ellipta, , Taking     EXAM:   Encounter Vitals  There were no vitals taken for this visit.           Physical Exam:  Physical Exam  Constitutional:       General: He is awake. He is not in acute distress.  Neurological:      Mental Status: He is alert.   Psychiatric:         Speech: Speech normal.         Behavior: Behavior normal. Behavior is cooperative.         Thought Content: Thought content normal.         Judgment: Judgment is not inappropriate.      Neurological Exam   Neurological Exam  Mental Status  Awake and alert. Speech is normal. Language is fluent with no aphasia. Attention and concentration are normal. Fund of knowledge is appropriate for level of education.     ALL DATA (I.e. labs, procedures, imaging, reports, clinical notes, etc.) FROM [facility] AND/OR OUTSIDE SOURCES, IF AVAILABLE, PERSONALLY REVIEWED:       ASSESSMENT, EDUCATION, AND COUNSELING:  This is a  62 y.o. male patient who presents to the neurology clinic. We had an extensive discussion about the patient's symptoms, signs, and work-up to date, if any. We discussed potential and/or definitive diagnoses, work-up, and potential treatments.       PLAN:  If applicable, the work-up such as labs, imaging, procedures, and/or other testing, referrals, and/or recommended treatment strategies are listed below.    Visit Diagnoses     ICD-10-CM   1. 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 Dyslipidemia  E78.5   5. Sleep apnea, unspecified type  G47.30   6. Hypertrophy of uvula  K13.79   7. Chronic obstructive pulmonary disease, unspecified COPD type ([facility])  J44.9        Orders Placed This Encounter    omeprazole (PRILOSEC) 40 MG delayed-release capsule    lacosamide (VIMPAT) 200 MG Tab tablet    lacosamide (VIMPAT) 100 MG Tab tablet    eslicarbazepine (APTIOM) 800 MG Tab    Cenobamate (XCOPRI) 14 x 50 MG & 14 x100 MG Tablet Therapy Pack    Cenobamate (XCOPRI) 14 x 12.5 MG & 14 x 25 MG Tablet Therapy Pack      I had an extensive discussion with the patient and his wife regarding the next steps in managing his refractory epilepsy.  I had mentioned that we try Xcopri in my prior notes should alternative therapies fail or provide adequate seizure control.  Discussed the fact that I think that we are at this point where we should try this medication, especially because he is on doses of Aptiom and Vimpat that are the upper limit of normal dosing range and that there is limited evidence supporting higher doses provider any further protection against seizures.  In fact, he is more likely to experience side effects that will eventually outweigh any sort of benefit.  Therefore we will not increase either the Vimpat or the Aptiom further.  Discussed introducing Xcopri and discussed that  this is a medication that needs to be introduced very slowly because of the rare risk of causing a rare but serious side effect called DRESS syndrome.  The risk of the side effect can be minimized if this drug is started at a very low dose and very slowly increased per 's recommendations.  Discussed that I am not sure that his insurance will cover it but I will submit his information through [employer] to determine cost of the medication.  Discussed about potentially starting him on a sample titration pack which we have in our clinic while we wait for authorization but his wife and patient felt more comfortable starting this medication once there was confirmation that insurance would cover it and/or it was something that was affordable. I think that is completely reasonable. Also had an extensive discussion that this is a medication that will allow him to decrease and likely stop 1 or both of his other antiseizure medications given the clinical trials have shown how superior it is for his type of epilepsy which is refractory focal epilepsy.  People's quality of life often improve because it allows the lower of doses and/or discontinuation of other  anti seizure medications, which also have a positive impact on quality of life since the end result if fewer and less disabling side effects to medications.  Patient expressed interest in trying this medication.  I filled out his application form for [employer].  I also sent a prescription to specialty pharmacy.  Discussed that it may take some days to determine cost of the medication but we will likely know mid to early next week.    BILLING DOCUMENTATION:     I spent a total of I spent a total of 96 minutes on the day of the visit.    minutes of face-to-face time in this visit. Over 50% of the time of the visit today was spent on counseling and/or coordination of care wtih the patient and/or family, as above in assessment in plan.    Fredy Franklin MD  Epilepsy and  General Neurology  Department of Neurology  Clinical  of Neurology [facility].

## 2023-02-19 RX ORDER — CENOBAMATE 50MG-100MG
KIT ORAL
Qty: 28 EACH | Refills: 0 | Status: SHIPPED | OUTPATIENT
Start: 2023-03-19 | End: 2023-04-06 | Stop reason: DRUGHIGH

## 2023-02-19 RX ORDER — CENOBAMATE 12.5-25MG
KIT ORAL
Qty: 28 EACH | Refills: 0 | Status: SHIPPED | OUTPATIENT
Start: 2023-02-19 | End: 2023-03-19

## 2023-02-19 NOTE — PATIENT INSTRUCTIONS
NEUROLOGY CLINIC VISIT WITH DR. FRANKLIN       PATIENT EXPECTATIONS AND IMPORTANT APPOINTMENT REMINDERS:   You matter to Dr. Franklin and you deserve the best care.   Dr. Franklin prides himself on providing the best possible care to all his patients. He strives to make each appointment meaningful, so that all your concerns are being addressed and all your neurological problems are being optimally treated. In order to achieve these goals for everyone, Dr. Franklin has listed important reminders and the best ways to prepare for each appointment. Please read each item carefully. Thank you!    REFILLS:   Request refills AT LEAST 1 week in advance to ensure you do not run out of medications    [employer]  It is STRONGLY encouraged that ALL patients sign up for [employer]. It is BY FAR the fastest and most convenient way for both Dr. Franklin and patients to obtain timely refills.  If you are having trouble signing up or logging into your account, staff are available to help you. Please ask a medical assistant or staff at the  to assist you.    TEST RESULS:   All labs and diagnostic test results will be reviewed at your next visit, UNLESS  Dr. Franklin determines that there are important findings on the tests need to be acted on sooner. Dr. Franklin will either call or send a message through [employer] if this is the case.    BE PREPARED PRIOR TO EVERY APPOINTMENT:  All patient are responsible for ensuring that ALL test results that were completed outside of the [employer] system have been received by our Neurology Department PRIOR to your appointment with Dr. Franklin.    IMPORTANT:  ALL images (not just the reports) must be sent and uploaded to the [employer] system. Dr. Franklin reviews all images personally prior to each visit. Ensuring that ALL the test results and test images are accessible to Dr. Franklin prior to your appointment is YOUR responsibility and an important part of making the most out of each appointment.   Bring a  [employer] [facility]-issues picture ID and an updated insurance card EVERY visit.  It is highly recommended that you bring at every visit a list of the most important topics that you want address. While it may not be possible to address all items on the list in a single visit, preparing a list will ensure that Dr. Franklin addresses the items that are most important to you and your health    PAPERWORK, DOCUMENTATION, LETTER REQUESTS:  You must notify the office ahead of your appointment of all paperwork or letter requests.   Please DO NOT wait until the last minute to make these requests. Please give all paperwork to the medical assistant at the start of the appointment and check-in process. Please note that Dr. Franklin may not be able complete some types of documentation in a single appointment or even within a single day or week. This is why it is important to communicate paperwork requests prior to your appointment and at least 2 weeks prior to any deadlines.    KNOW ALL YOU MEDICATIONS:   AT EVERY SINGLE APPOINTMENT, please bring a list of every single prescribed, non-prescribed, and over the counter medication or supplements you are taking, including ones taken on a rare or intermittent basis.  Include the following information for each prescribed or non-prescribed medications:  Name of medication   The strength of EACH pill/capsule/tablet, etc.   The number of pills/capsules/tablets, etc taken per dose  The number and time of day that doses are taken  For every single Supplement that you take on a routine or intermittent basis, you must include:  The Brand Name   A complete list of every single ingredient, compound, vitamin, and/or mineral in each dose, along with the corresponding amounts/strengths of all ingredients, vitamins, minerals, etc., if such information is provided or known  The number of doses taken per day and time of day doses are taken  If medications are taken on an intermittent or as needed basis, please  "estimate how many days per week or days per month the medications are used  DO NOT just print out your medication list from [employer] or bring a list from a prior appointment or hospitalizations because the information is often often unreliable, inaccurate, outdated, and/or incomplete   The list should be printed or written  If you forget or do not have a list of all the medication, then it is acceptable, although less preferred, to bring all the bottles to the appointment     ARRIVE EARLY FOR ALL VISITS:  Please note that we are unable to accommodate late arrivals as per office policy.  YOU-the patient - (NOT a parent, spouse, or friend) must be physically present at check-in no later than 12 minutes after the scheduled appointment time, or you will be asked to reschedule   Consider scheduling a virtual appointment with Dr. Franklin through [employer] as an alternative if transportation to the clinic is difficult or unavailable   Please note, however, that virtual visits can only be scheduled after being an established patient of Dr. Franklin. All new appointments must be done in-person in clinic  Some insurances will not cover the cost of virtual appointments. Please check with your insurance to find out if these visits are covered    COMMUNICATING URGENT AND NON-URGENT MATTERS:  Your concerns are important and deserve to be heard and addressed. If you have an urgent matter, there are two methods that will ensure your concerns are prioritized appropriately:   Preferred method: Sign-up/Login to your [employer] account and send a message addressed to Dr. Franklin or Margret David (Dr. Franklin's assistant). In the subject line, type \"urgent\" followed by a word or phrase describing the situation (For example, write \"Urgent: Out of antiseuzre med and need refill\" or \"Urgent: Severe side effects to new meds\". In doing this, our staff can ensure urgent messages are triaged appropriately and communicated to Dr. Franklin that day.  Call " [facility] main line at [phone]. Dr. Franklin's voicemail extension is [phone]. When leaving a voice message, specifically indicate if it is urgent (or non-urgent) so that the matter can be triaged appropriately and addressed in a timely manner    Thank you for taking important action in your care!

## 2023-03-17 ENCOUNTER — PHARMACY VISIT (OUTPATIENT)
Dept: PHARMACY | Facility: MEDICAL CENTER | Age: 63
End: 2023-03-17
Payer: COMMERCIAL

## 2023-03-17 PROCEDURE — RXMED WILLOW AMBULATORY MEDICATION CHARGE: Performed by: INTERNAL MEDICINE

## 2023-03-17 RX ORDER — ZOSTER VACCINE RECOMBINANT, ADJUVANTED 50 MCG/0.5
KIT INTRAMUSCULAR
Qty: 0.5 ML | Refills: 0 | Status: SHIPPED | OUTPATIENT
Start: 2023-03-17 | End: 2023-03-31

## 2023-03-23 ENCOUNTER — TELEPHONE (OUTPATIENT)
Dept: NEUROLOGY | Facility: MEDICAL CENTER | Age: 63
End: 2023-03-23
Payer: MEDICARE

## 2023-03-31 ENCOUNTER — OFFICE VISIT (OUTPATIENT)
Dept: MEDICAL GROUP | Facility: PHYSICIAN GROUP | Age: 63
End: 2023-03-31
Payer: MEDICARE

## 2023-03-31 VITALS
WEIGHT: 173 LBS | DIASTOLIC BLOOD PRESSURE: 72 MMHG | HEART RATE: 70 BPM | BODY MASS INDEX: 26.22 KG/M2 | SYSTOLIC BLOOD PRESSURE: 126 MMHG | HEIGHT: 68 IN | OXYGEN SATURATION: 100 %

## 2023-03-31 DIAGNOSIS — Z00.00 MEDICARE ANNUAL WELLNESS VISIT, INITIAL: Primary | ICD-10-CM

## 2023-03-31 DIAGNOSIS — E56.9 VITAMIN DEFICIENCY: ICD-10-CM

## 2023-03-31 DIAGNOSIS — J44.9 CHRONIC OBSTRUCTIVE PULMONARY DISEASE, UNSPECIFIED COPD TYPE (HCC): ICD-10-CM

## 2023-03-31 DIAGNOSIS — G40.219 INTRACTABLE FOCAL EPILEPSY WITH IMPAIRMENT OF CONSCIOUSNESS (HCC): ICD-10-CM

## 2023-03-31 DIAGNOSIS — E78.5 DYSLIPIDEMIA: ICD-10-CM

## 2023-03-31 DIAGNOSIS — Z85.46 HISTORY OF PROSTATE CANCER: ICD-10-CM

## 2023-03-31 PROCEDURE — G0439 PPPS, SUBSEQ VISIT: HCPCS | Performed by: NURSE PRACTITIONER

## 2023-03-31 RX ORDER — PRAVASTATIN SODIUM 40 MG
40 TABLET ORAL NIGHTLY
Qty: 90 TABLET | Refills: 3 | Status: SHIPPED | OUTPATIENT
Start: 2023-03-31 | End: 2023-05-23 | Stop reason: SDUPTHER

## 2023-03-31 ASSESSMENT — FIBROSIS 4 INDEX: FIB4 SCORE: 0.79

## 2023-03-31 ASSESSMENT — PATIENT HEALTH QUESTIONNAIRE - PHQ9: CLINICAL INTERPRETATION OF PHQ2 SCORE: 0

## 2023-03-31 ASSESSMENT — ACTIVITIES OF DAILY LIVING (ADL): BATHING_REQUIRES_ASSISTANCE: 0

## 2023-03-31 ASSESSMENT — ENCOUNTER SYMPTOMS: GENERAL WELL-BEING: GOOD

## 2023-03-31 NOTE — PROGRESS NOTES
Chief Complaint   Patient presents with    Annual Exam       HPI:  Fredy Zavala is a 62 y.o. here with wife for initial Medicare Annual Wellness Visit. He recently switched Medicare as his primary insurance. He is doing well today and has no complaints.   He follows Dr Franklin[facility] for epilepsy management.   He is no longer seeing [facility] for COPD management as he is stable on current inhaler.     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 90 Tablet 3    Cenobamate (XCOPRI) 14 x 50 MG & 14 x100 MG Tablet Therapy Pack Take 50 mg by mouth at bedtime for 14 days, THEN 100 mg at bedtime for 14 days. Indications: Partial Onset Seizure 28 Each 0    omeprazole (PRILOSEC) 40 MG delayed-release capsule       lacosamide (VIMPAT) 200 MG Tab tablet Take 200 mg in the morning, 200 mg + 100 mg in the evening.Take 200 mg in the morning, 200 mg + 100 mg in the evening. 60 Tablet 2    lacosamide (VIMPAT) 100 MG Tab tablet Take 1 Tablet by mouth every day for 180 days. Take 100 mg once per day in the evening with the 200 mg dose. Continue to take 200 mg every morning. Total daily dose = 500 mg (200 mg in AM / 300 mg in PM) 30 Tablet 5    eslicarbazepine (APTIOM) 800 MG Tab TAKE TWO TABLETS BY MOUTH EVERY EVENINGTAKE 60 Tablet 2    albuterol 108 (90 Base) MCG/ACT Aero Soln inhalation aerosol USE 2 INHALATIONS EVERY 6 HOURS AS NEEDED FOR SHORTNESS OF BREATH 8.5 g 10    TRELEGY  ELLIPTA 200-62.5-25 MCG/INH AEROSOL POWDER, BREATH ACTIVATED        No current facility-administered medications for this visit.            Current supplements as per medication list.     Allergies: Patient has no allergy information on record.    Current social contact/activities:   He  reports that he has never smoked. He has never used smokeless tobacco. He reports that he does not currently use alcohol. He reports that he does not currently use drugs.  Counseling given: Not Answered      ROS:    Gait: Uses no assistive device   Ostomy: no   Other tubes: no   Amputations: no   Chronic oxygen use: no   Last eye exam:2022   : Denies any urinary leakage during the last 6 months incontinence.     Screening:  Depression Screening  Little interest or pleasure in doing things?  0 - not at all  Feeling down, depressed , or hopeless? 0 - not at all  Trouble falling or staying asleep, or sleeping too much?     Feeling tired or having little energy?     Poor appetite or overeating?     Feeling bad about yourself - or that you are a failure or have let yourself or your family down?    Trouble concentrating on things, such as reading the newspaper or watching television?    Moving or speaking so slowly that other people could have noticed.  Or the opposite - being so fidgety or restless that you have been moving around a lot more than usual?     Thoughts that you would be better off dead, or of hurting yourself?     Patient Health Questionnaire Score:      If depressive symptoms identified deferred to follow up visit unless specifically addressed in assessment and plan.    Interpretation of PHQ-9 Total Score   Score Severity   1-4 No Depression   5-9 Mild Depression   10-14 Moderate Depression   15-19 Moderately Severe Depression   20-27 Severe Depression    Screening for Cognitive Impairment  Three Minute Recall (daughter, heaven, mountain) 3/3    Rj clock face with all 12 numbers and set the hands to show 10 past 11.   Yes    Cognitive concerns identified deferred for follow up unless specifically addressed in assessment and plan.    Fall Risk Assessment  Has the patient had two or more falls in the last year or any fall with injury in the last year?  No    Safety Assessment  Throw rugs on floor.  No  Handrails on all stairs.  Yes  Good lighting in all hallways.  Yes  Difficulty hearing.  No  Patient counseled about all safety risks that were identified.    Functional Assessment ADLs  Are there any barriers preventing you from cooking for yourself or meeting nutritional needs?  No.    Are there any barriers preventing you from driving safely or obtaining transportation?  No.    Are there any barriers preventing you from using a telephone or calling for help?  No    Are there any barriers preventing you from shopping?  No.    Are there any barriers preventing you from taking care of your own finances?  No    Are there any barriers preventing you from managing your medications?  No    Are there any barriers preventing you from showering, bathing or dressing yourself? No    Are you currently engaging in any exercise or physical activity?  Yes.    What is your perception of your health? Good    Advance Care Planning  Do you have an Advance Directive, Living Will, Durable Power of , or POLST? No Starting guide packet provided today                 Health Maintenance Summary            Postponed - IMM INFLUENZA (1) Postponed until 6/30/2023 01/08/2022  Imm Admin: Influenza Vaccine Quad Inj (Pf)    11/22/2019  Imm Admin: Influenza Vaccine Quad Inj (Pf)    11/01/2016  Imm Admin: Influenza Vaccine Quad Inj (Preserved)    12/22/2015  Imm Admin: Influenza Vaccine Quad Inj (Preserved)    02/04/2015  Imm Admin: Influenza Vaccine Quad Inj (Preserved)    Only the first 5 history entries have been loaded, but more history exists.              Annual Pulmonary Function Test / Spirometry (Yearly) Next due on 9/24/2023 09/24/2022   PFT DICTATED RESULTS    02/06/2009  PFT DICTATED RESULTS              IMM DTaP/Tdap/Td Vaccine (2 - Td or Tdap) Next due on 7/2/2024 07/02/2014  Imm Admin: Tdap Vaccine              COLORECTAL CANCER SCREENING (COLONOSCOPY - Every 5 Years) Next due on 1/4/2028 01/04/2023  AMB EXTERNAL COLONOSCOPY RESULTS    12/04/2012  COLONOSCOPY (Previously completed - 8/10)              HEPATITIS C SCREENING  Completed      08/13/2020  HEP C VIRUS ANTIBODY              COVID-19 Vaccine (Series Information) Completed      10/13/2022  Imm Admin: PFIZER BIVALENT BOOSTER SARS-COV-2 VACCINE (12+)    12/07/2021  Imm Admin: MODERNA SARS-COV-2 VACCINE (12+)    04/16/2021  Imm Admin: PFIZER PURPLE CAP SARS-COV-2 VACCINATION (12+)    03/20/2021  Imm Admin: PFIZER PURPLE CAP SARS-COV-2 VACCINATION (12+)              IMM PNEUMOCOCCAL VACCINE: 0-64 Years (Series Information) Completed      11/01/2022  Imm Admin: Pneumococcal polysaccharide vaccine (PPSV-23)    09/21/2022  Imm Admin: Pneumococcal Conjugate Vaccine (PCV20)    02/28/2019  Imm Admin: Pneumococcal Conjugate Vaccine (Prevnar/PCV-13)              IMM ZOSTER VACCINES (Series Information) Completed      03/17/2023  Imm Admin: Zoster Vaccine Recombinant (RZV) (SHINGRIX)    11/01/2022  Imm Admin: Zoster Vaccine Live (ZVL) (Zostavax) - HISTORICAL DATA    10/13/2022  Imm Admin: Zoster Vaccine Recombinant (RZV) (SHINGRIX)              IMM HEP B VACCINE (Series Information) Aged Out      No completion history exists for this topic.              IMM MENINGOCOCCAL ACWY VACCINE (Series Information) Aged Out      No completion history exists for this topic.                    Patient Care Team:  Sheri Weathers D.N.P. as PCP - General (Nurse Practitioner Family)  Jamaal Conde M.D. (Urology)  Fredy Franklin M.D. as Attending Team Physician (Neurology)      Social History     Tobacco Use    Smoking status: Never    Smokeless tobacco: Never   Vaping Use    Vaping Use: Never used  "  Substance Use Topics    Alcohol use: Not Currently     Comment: rare    Drug use: Not Currently     Comment: previous     Family History   Problem Relation Age of Onset    Diabetes Father     Alcohol/Drug Brother     Cancer Neg Hx     Stroke Neg Hx     Heart Disease Neg Hx      He  has a past medical history of ASTHMA, Cancer (HCC) (05/2012), COPD (chronic obstructive pulmonary disease) (HCC), Dyslipidemia, and Partial epilepsy with impairment of consciousness (HCC).   Past Surgical History:   Procedure Laterality Date    PROSTATECTOMY ROBOTIC  5/29/2012    Performed by JACOB RAMOS at [facility]    COLONOSCOPY  8/10    grade 1 int. hemorrhoids       Exam:   /72   Pulse 70   Ht 1.727 m (5' 8\")   Wt 78.5 kg (173 lb)   SpO2 100%  Body mass index is 26.3 kg/m².    Hearing good.    Dentition good  Alert, oriented in no acute distress.  Eye contact is good, speech goal directed, affect calm    Physical Exam  Constitutional:       Appearance: Normal appearance.   HENT:      Head: Normocephalic and atraumatic.      Right Ear: Hearing, tympanic membrane, ear canal and external ear normal.      Left Ear: Hearing, tympanic membrane, ear canal and external ear normal.   Eyes:      Extraocular Movements: Extraocular movements intact.      Conjunctiva/sclera: Conjunctivae normal.      Pupils: Pupils are equal, round, and reactive to light.   Cardiovascular:      Rate and Rhythm: Normal rate and regular rhythm.      Pulses: Normal pulses.      Heart sounds: Normal heart sounds.   Pulmonary:      Effort: Pulmonary effort is normal.      Breath sounds: Normal breath sounds.   Musculoskeletal:         General: Normal range of motion.      Cervical back: Normal range of motion and neck supple.      Right lower leg: No edema.      Left lower leg: No edema.   Skin:     General: Skin is warm and dry.   Neurological:      General: No focal deficit present.      Mental Status: He is alert and oriented to " person, place, and time.   Psychiatric:         Mood and Affect: Mood normal.         Behavior: Behavior normal.         Thought Content: Thought content normal.         Judgment: Judgment normal.       Assessment and Plan. The following treatment and monitoring plan is recommended:    1. Intractable focal epilepsy with impairment of consciousness (HCC)  Followed by Dr Galvan/[facility]  Taking Vimpat 300 mg QAM, 200 mg QPM;  Aptiom 1600mg QPM; recently added Xcopri  He saw Dr Franklin to discuss VNS (vagal nerve stimilator); determined not safe option with his sleep apnea. He met with Dr zambrano/[facility] and declined CPAP for now. He was also referred to ENT,  pending appt.   - continues on Vimpat, recently added Xcopri    2. Chronic obstructive pulmonary disease, unspecified COPD type ([facility])  Never smoked cigarettes.  Confirmed COPD with hyperinflation and pulmonary function test with pulmonologist.  Managed by All[facility]; states he had allergy testing last year and he no longer needs to see them since he is stable on current inhaler  On Daily Trelegy inhaler, prn albuterol    3. Dyslipidemia   Latest Reference Range & Units 11/11/21 07:08 11/03/22 10:10   Cholesterol,Tot 100 - 199 mg/dL 268 (H) 244 (H)   Triglycerides 0 - 149 mg/dL 106 91   HDL >=40 mg/dL 60 57   LDL <100 mg/dL  169 (H)   LDL Chol Calc (NIH) 0 - 99 mg/dL 189 (H)    VLDL Cholesterol Calc 5 - 40 mg/dL 19    The 10-year ASCVD risk score (Galdino DK, et al., 2019) is: 11.4%     Chronic; LDL goal <100  Tolerating statin, no myalgia; has hx epilepsy  - increase Pravastatin to 40mg (from 20) QD  - recheck fasting lipid in 3 months    - pravastatin (PRAVACHOL) 40 MG tablet; Take 1 Tablet by mouth every evening.  Dispense: 90 Tablet; Refill: 3  - Lipid Profile; Future    4. Vitamin deficiency   Latest Reference Range & Units 11/11/21 07:08 11/03/22 10:10   25-Hydroxy   Vitamin D 25 30 - 100 ng/mL 47.1 26 (L)     - increase daily vit  D to 4000 units (from 2000 units)  - check vit D level in 3 months    - VITAMIN D,25 HYDROXY (DEFICIENCY); Future    5. History of prostate cancer  S/p prostatectomy in 2012  No longer following urology  PSA <0.02 in 11/2022  Denies urinary symptoms    6. Medicare annual wellness visit, initial  Services suggested: No services needed at this time  Health Care Screening: Age-appropriate preventive services Medicare covers discussed today and ordered if indicated.  Referrals offered: Community-based lifestyle interventions to reduce health risks and promote self-management and wellness, fall prevention, nutrition, physical activity, tobacco-use cessation, weight loss, and mental health services as per orders if indicated.    Discussion today about general wellness and lifestyle habits:    Prevent falls and reduce trip hazards; Cautioned about securing or removing rugs.  Have a working fire alarm and carbon monoxide detector;   Engage in regular physical activity and social activities     Follow-up: Return in about 6 months (around 9/30/2023) for dyslipidemia, copd.

## 2023-03-31 NOTE — ASSESSMENT & PLAN NOTE
Latest Reference Range & Units 11/11/21 07:08 11/03/22 10:10   25-Hydroxy   Vitamin D 25 30 - 100 ng/mL 47.1 26 (L)     - increase daily vit D to 4000 units (from 2000 units)  - check vit D level in 3 months

## 2023-03-31 NOTE — ASSESSMENT & PLAN NOTE
Latest Reference Range & Units 11/11/21 07:08 11/03/22 10:10   Cholesterol,Tot 100 - 199 mg/dL 268 (H) 244 (H)   Triglycerides 0 - 149 mg/dL 106 91   HDL >=40 mg/dL 60 57   LDL <100 mg/dL  169 (H)   LDL Chol Calc (NIH) 0 - 99 mg/dL 189 (H)    VLDL Cholesterol Calc 5 - 40 mg/dL 19    The 10-year ASCVD risk score (Galdino ROLLINS, et al., 2019) is: 11.4%     Chronic; LDL goal <100  Tolerating statin, no myalgia; has hx epilepsy  - increase Pravastatin to 40mg (from 20) QD  - recheck fasting lipid in 3 months

## 2023-03-31 NOTE — PATIENT INSTRUCTIONS
Advance Directive    Advance directives are legal documents that let you make choices ahead of time about your health care and medical treatment in case you become unable to communicate for yourself. Advance directives are a way for you to communicate your wishes to family, friends, and health care providers. This can help convey your decisions about end-of-life care if you become unable to communicate.  Discussing and writing advance directives should happen over time rather than all at once. Advance directives can be changed depending on your situation and what you want, even after you have signed the advance directives.  If you do not have an advance directive, some states assign family decision makers to act on your behalf based on how closely you are related to them. Each state has its own laws regarding advance directives. You may want to check with your health care provider, , or state representative about the laws in your state. There are different types of advance directives, such as:  Medical power of .  Living will.  Do not resuscitate (DNR) or do not attempt resuscitation (DNAR) order.  Health care proxy and medical power of   A health care proxy, also called a health care agent, is a person who is appointed to make medical decisions for you in cases in which you are unable to make the decisions yourself. Generally, people choose someone they know well and trust to represent their preferences. Make sure to ask this person for an agreement to act as your proxy. A proxy may have to exercise judgment in the event of a medical decision for which your wishes are not known.  A medical power of  is a legal document that names your health care proxy. Depending on the laws in your state, after the document is written, it may also need to be:  Signed.  Notarized.  Dated.  Copied.  Witnessed.  Incorporated into your medical record.  You may also want to appoint someone to manage your  financial affairs in a situation in which you are unable to do so. This is called a durable power of  for finances. It is a separate legal document from the durable power of  for health care. You may choose the same person or someone different from your health care proxy to act as your agent in financial matters.  If you do not appoint a proxy, or if there is a concern that the proxy is not acting in your best interests, a court-appointed guardian may be designated to act on your behalf.  Living will  A living will is a set of instructions documenting your wishes about medical care when you cannot express them yourself. Health care providers should keep a copy of your living will in your medical record. You may want to give a copy to family members or friends. To alert caregivers in case of an emergency, you can place a card in your wallet to let them know that you have a living will and where they can find it. A living will is used if you become:  Terminally ill.  Incapacitated.  Unable to communicate or make decisions.  Items to consider in your living will include:  The use or non-use of life-sustaining equipment, such as dialysis machines and breathing machines (ventilators).  A DNR or DNAR order, which is the instruction not to use cardiopulmonary resuscitation (CPR) if breathing or heartbeat stops.  The use or non-use of tube feeding.  Withholding of food and fluids.  Comfort (palliative) care when the goal becomes comfort rather than a cure.  Organ and tissue donation.  A living will does not give instructions for distributing your money and property if you should pass away. It is recommended that you seek the advice of a  when writing a will. Decisions about taxes, beneficiaries, and asset distribution will be legally binding. This process can relieve your family and friends of any concerns surrounding disputes or questions that may come up about the distribution of your assets.  DNR or  DNAR  A DNR or DNAR order is a request not to have CPR in the event that your heart stops beating or you stop breathing. If a DNR or DNAR order has not been made and shared, a health care provider will try to help any patient whose heart has stopped or who has stopped breathing. If you plan to have surgery, talk with your health care provider about how your DNR or DNAR order will be followed if problems occur.  Summary  Advance directives are the legal documents that allow you to make choices ahead of time about your health care and medical treatment in case you become unable to communicate for yourself.  The process of discussing and writing advance directives should happen over time. You can change the advance directives, even after you have signed them.  Advance directives include DNR or DNAR orders, living conway, and designating an agent as your medical power of .  This information is not intended to replace advice given to you by your health care provider. Make sure you discuss any questions you have with your health care provider.  Document Released: 03/26/2009 Document Revised: 01/22/2020 Document Reviewed: 11/06/2017  Elsevier Patient Education © 2020 [employer]vier Inc.

## 2023-04-04 ENCOUNTER — TELEPHONE (OUTPATIENT)
Dept: NEUROLOGY | Facility: MEDICAL CENTER | Age: 63
End: 2023-04-04
Payer: MEDICARE

## 2023-04-06 ENCOUNTER — TELEMEDICINE (OUTPATIENT)
Dept: NEUROLOGY | Facility: MEDICAL CENTER | Age: 63
End: 2023-04-06
Attending: STUDENT IN AN ORGANIZED HEALTH CARE EDUCATION/TRAINING PROGRAM
Payer: MEDICARE

## 2023-04-06 DIAGNOSIS — G47.19 EXCESSIVE DAYTIME SLEEPINESS: ICD-10-CM

## 2023-04-06 DIAGNOSIS — G47.30 SLEEP APNEA, UNSPECIFIED TYPE: ICD-10-CM

## 2023-04-06 DIAGNOSIS — G25.1 TREMOR DUE TO MULTIPLE DRUGS: ICD-10-CM

## 2023-04-06 DIAGNOSIS — R25.1 TREMOR: ICD-10-CM

## 2023-04-06 DIAGNOSIS — K13.79 HYPERTROPHY OF UVULA: ICD-10-CM

## 2023-04-06 DIAGNOSIS — J44.9 CHRONIC OBSTRUCTIVE PULMONARY DISEASE, UNSPECIFIED COPD TYPE (HCC): ICD-10-CM

## 2023-04-06 DIAGNOSIS — G40.219 INTRACTABLE FOCAL EPILEPSY WITH IMPAIRMENT OF CONSCIOUSNESS (HCC): ICD-10-CM

## 2023-04-06 DIAGNOSIS — G40.209 PARTIAL EPILEPSY WITH IMPAIRMENT OF CONSCIOUSNESS (HCC): ICD-10-CM

## 2023-04-06 PROCEDURE — 99215 OFFICE O/P EST HI 40 MIN: CPT | Mod: 95 | Performed by: STUDENT IN AN ORGANIZED HEALTH CARE EDUCATION/TRAINING PROGRAM

## 2023-04-06 RX ORDER — CENOBAMATE 50MG-100MG
KIT ORAL
Qty: 28 EACH | Refills: 0 | Status: SHIPPED | OUTPATIENT
Start: 2023-04-06 | End: 2023-05-01 | Stop reason: SDUPTHER

## 2023-04-06 NOTE — PATIENT INSTRUCTIONS
NEUROLOGY CLINIC VISIT WITH DR. FRANKLIN       PATIENT EXPECTATIONS AND IMPORTANT APPOINTMENT REMINDERS:   You matter to Dr. Franklin and you deserve the best care.   Dr. Franklin prides himself on providing the best possible care to all his patients. He strives to make each appointment meaningful, so that all your concerns are being addressed and all your neurological problems are being optimally treated. In order to achieve these goals for everyone, Dr. Franklin has listed important reminders and the best ways to prepare for each appointment. Please read each item carefully. Thank you!    REFILLS:   Request refills AT LEAST 1 week in advance to ensure you do not run out of medications    [employer]  It is STRONGLY encouraged that ALL patients sign up for [employer]. It is BY FAR the fastest and most convenient way for both Dr. Franklin and patients to obtain timely refills.  If you are having trouble signing up or logging into your account, staff are available to help you. Please ask a medical assistant or staff at the  to assist you.    TEST RESULS:   All labs and diagnostic test results will be reviewed at your next visit, UNLESS  Dr. Franklin determines that there are important findings on the tests need to be acted on sooner. Dr. Franklin will either call or send a message through [employer] if this is the case.    BE PREPARED PRIOR TO EVERY APPOINTMENT:  All patient are responsible for ensuring that ALL test results that were completed outside of the [employer] system have been received by our Neurology Department PRIOR to your appointment with Dr. Franklin.    IMPORTANT:  ALL images (not just the reports) must be sent and uploaded to the [employer] system. Dr. Franklin reviews all images personally prior to each visit. Ensuring that ALL the test results and test images are accessible to Dr. Franklin prior to your appointment is YOUR responsibility and an important part of making the most out of each appointment.   Bring a  [facility] picture ID and an updated insurance card EVERY visit.  It is highly recommended that you bring at every visit a list of the most important topics that you want address. While it may not be possible to address all items on the list in a single visit, preparing a list will ensure that Dr. Franklin addresses the items that are most important to you and your health    PAPERWORK, DOCUMENTATION, LETTER REQUESTS:  You must notify the office ahead of your appointment of all paperwork or letter requests.   Please DO NOT wait until the last minute to make these requests. Please give all paperwork to the medical assistant at the start of the appointment and check-in process. Please note that Dr. Franklin may not be able complete some types of documentation in a single appointment or even within a single day or week. This is why it is important to communicate paperwork requests prior to your appointment and at least 2 weeks prior to any deadlines.    KNOW ALL YOU MEDICATIONS:   AT EVERY SINGLE APPOINTMENT, please bring a list of every single prescribed, non-prescribed, and over the counter medication or supplements you are taking, including ones taken on a rare or intermittent basis.  Include the following information for each prescribed or non-prescribed medications:  Name of medication   The strength of EACH pill/capsule/tablet, etc.   The number of pills/capsules/tablets, etc taken per dose  The number and time of day that doses are taken  For every single Supplement that you take on a routine or intermittent basis, you must include:  The Brand Name   A complete list of every single ingredient, compound, vitamin, and/or mineral in each dose, along with the corresponding amounts/strengths of all ingredients, vitamins, minerals, etc., if such information is provided or known  The number of doses taken per day and time of day doses are taken  If medications are taken on an intermittent or as needed basis, please  "estimate how many days per week or days per month the medications are used  DO NOT just print out your medication list from [employer] or bring a list from a prior appointment or hospitalizations because the information is often often unreliable, inaccurate, outdated, and/or incomplete   The list should be printed or written  If you forget or do not have a list of all the medication, then it is acceptable, although less preferred, to bring all the bottles to the appointment     ARRIVE EARLY FOR ALL VISITS:  Please note that we are unable to accommodate late arrivals as per office policy.  YOU-the patient - (NOT a parent, spouse, or friend) must be physically present at check-in no later than 12 minutes after the scheduled appointment time, or you will be asked to reschedule   Consider scheduling a virtual appointment with Dr. Franklin through [employer] as an alternative if transportation to the clinic is difficult or unavailable   Please note, however, that virtual visits can only be scheduled after being an established patient of Dr. Franklin. All new appointments must be done in-person in clinic  Some insurances will not cover the cost of virtual appointments. Please check with your insurance to find out if these visits are covered    COMMUNICATING URGENT AND NON-URGENT MATTERS:  Your concerns are important and deserve to be heard and addressed. If you have an urgent matter, there are two methods that will ensure your concerns are prioritized appropriately:   Preferred method: Sign-up/Login to your [employer] account and send a message addressed to Dr. Franklin or Margret David (Dr. Franklin's assistant). In the subject line, type \"urgent\" followed by a word or phrase describing the situation (For example, write \"Urgent: Out of antiseuzre med and need refill\" or \"Urgent: Severe side effects to new meds\". In doing this, our staff can ensure urgent messages are triaged appropriately and communicated to Dr. Franklin that day.  Call " [facility] main line at [phone]. Dr. Franklin's voicemail extension is [phone]. When leaving a voice message, specifically indicate if it is urgent (or non-urgent) so that the matter can be triaged appropriately and addressed in a timely manner    Thank you for taking important action in your care!

## 2023-04-06 NOTE — PROGRESS NOTES
Telemedicine: Established Patient   This evaluation was conducted via Zoom using secure and encrypted videoconferencing technology. The patient was in their home in the [facility][1].    The patient's identity was confirmed and verbal consent was obtained for this virtual visit.     NEUROLOGY FOLLOW-UP - 04/06/2023   REFERRING PROVIDER  No referring provider defined for this encounter.    PCP  Sheri Weathers   [phone]   [facility] [[id number]]     REASON FOR VISIT: Fredy Zavala 62 y.o. male presents today for follow-up.     SUMMARY OF PROBLEMS AND/OR DIAGNOSES AS PER MY PRIOR NOTES/ENCOUNTERS:    INTERVAL HISTORY:  Overall Fredy is doing well. He had a total of 5 brief seizures since we last connected 1.5 months ago in total. All seizures occurred when he was on Xcopri 12.5 mg.  He has been on Xcopri 25 mg for 2 weeks and has not had any seizures. He is starting to experience side effects of fatigue, tiredness. No other complaints though. In fact, he is please so far with the response to the Xcopri.      Current AEDs  Aptiom 1600  Vimpat 300/200    Currently on Xcopri 25 QHS          Patient's PMH, PSH, FH, and SH were reviewed.    ROS: All review of systems complete and are negative except as documented    CURRENT MEDICATIONS AT THE TIME OF THIS ENCOUNTER:    Current Outpatient Medications:     Xcopri, Take 50 mg by mouth at bedtime for 14 days, THEN 100 mg at bedtime for 14 days. Take 50 mg by mouth at bedtime for 14 days, THEN 100 mg at bedtime for 14 days. Indications: Partial Onset Seizure  Indications: Partial Onset Seizure    pravastatin, 40 mg, Oral, Nightly, Taking    omeprazole, , Taking    lacosamide, Take 200 mg in the morning, 200 mg + 100 mg in the evening.Take 200 mg in the morning, 200 mg + 100 mg in the evening., Taking    lacosamide, 100 mg, Oral, DAILY, Taking    eslicarbazepine, TAKE TWO TABLETS BY MOUTH EVERY EVENINGTAKE, Taking    albuterol, USE 2 INHALATIONS EVERY 6  HOURS AS NEEDED FOR SHORTNESS OF BREATH, Taking    Trelegy Ellipta, , Taking     EXAM:   Encounter Vitals  There were no vitals taken for this visit.           Physical Exam:  Physical Exam  Constitutional:       General: He is not in acute distress.  Neurological:      Mental Status: He is alert.   Psychiatric:         Speech: Speech normal.      Neurological Exam   Neurological Exam  Mental Status  Alert. Speech is normal. Language is fluent with no aphasia. Attention and concentration are normal.     ALL DATA (I.e. labs, procedures, imaging, reports, clinical notes, etc.) FROM [facility] AND/OR OUTSIDE SOURCES, IF AVAILABLE, PERSONALLY REVIEWED:       ASSESSMENT, EDUCATION, AND COUNSELING:  This is a 62 y.o. male patient who presents to the neurology clinic. We had an extensive discussion about the patient's symptoms, signs, and work-up to date, if any. We discussed potential and/or definitive diagnoses, work-up, and potential treatments.       PLAN:  If applicable, the work-up such as labs, imaging, procedures, and/or other testing, referrals, and/or recommended treatment strategies are listed below.    Visit Diagnoses     ICD-10-CM   1. 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 We will continue uptitrating the Xcopri to 50 mg per night for 2 weeks, then increase to 100 mg thereafter. Since he has not had any seizures for 4 weeks and since he is starting to feel uncomfortable side effects of fatigue and brain fog we will start tapering off of vimpat. When he increases to Xcopri to 50 mg per night, he will decrease vimpat to 200 BifD  [FreeTextEntry1] : Patient is a 40-year-old female with past medical history significant for asthma who presents today for an acute visit. The patient states that her cough and shortness of breath have deteriorated and states that she has not been complaint with her bronchodilator therapy. x 2 weeks, then further decrease vimpat to 100 mg AM/200 mg PM. We will then check in with a virtual visit to decide next best steps in this transition depending on how he is doing.                 BILLING DOCUMENTATION:     I spent a total of I spent a total of 40 minutes on the day of the visit.    minutes of face-to-face time in this visit. Over 50% of the time of the visit today was spent on counseling and/or coordination of care wtih the patient and/or family, as above in assessment in plan.    Fredy Franklin MD  Epilepsy and General Neurology  Department of Neurology  Clinical  of Neurology [facility].

## 2023-04-10 ENCOUNTER — HOSPITAL ENCOUNTER (OUTPATIENT)
Dept: LAB | Facility: MEDICAL CENTER | Age: 63
End: 2023-04-10
Attending: STUDENT IN AN ORGANIZED HEALTH CARE EDUCATION/TRAINING PROGRAM
Payer: MEDICARE

## 2023-04-10 DIAGNOSIS — G40.209 PARTIAL EPILEPSY WITH IMPAIRMENT OF CONSCIOUSNESS (HCC): ICD-10-CM

## 2023-04-10 LAB
ALBUMIN SERPL BCP-MCNC: 4.6 G/DL (ref 3.2–4.9)
ALBUMIN/GLOB SERPL: 1.7 G/DL
ALP SERPL-CCNC: 114 U/L (ref 30–99)
ALT SERPL-CCNC: 19 U/L (ref 2–50)
ANION GAP SERPL CALC-SCNC: 14 MMOL/L (ref 7–16)
AST SERPL-CCNC: 17 U/L (ref 12–45)
BILIRUB SERPL-MCNC: 0.5 MG/DL (ref 0.1–1.5)
BUN SERPL-MCNC: 12 MG/DL (ref 8–22)
CALCIUM ALBUM COR SERPL-MCNC: 9 MG/DL (ref 8.5–10.5)
CALCIUM SERPL-MCNC: 9.5 MG/DL (ref 8.5–10.5)
CHLORIDE SERPL-SCNC: 104 MMOL/L (ref 96–112)
CO2 SERPL-SCNC: 22 MMOL/L (ref 20–33)
CREAT SERPL-MCNC: 0.66 MG/DL (ref 0.5–1.4)
FASTING STATUS PATIENT QL REPORTED: NORMAL
GFR SERPLBLD CREATININE-BSD FMLA CKD-EPI: 105 ML/MIN/1.73 M 2
GLOBULIN SER CALC-MCNC: 2.7 G/DL (ref 1.9–3.5)
GLUCOSE SERPL-MCNC: 85 MG/DL (ref 65–99)
POTASSIUM SERPL-SCNC: 4.6 MMOL/L (ref 3.6–5.5)
PROT SERPL-MCNC: 7.3 G/DL (ref 6–8.2)
SODIUM SERPL-SCNC: 140 MMOL/L (ref 135–145)

## 2023-04-10 PROCEDURE — 80053 COMPREHEN METABOLIC PANEL: CPT

## 2023-04-10 PROCEDURE — 36415 COLL VENOUS BLD VENIPUNCTURE: CPT

## 2023-04-10 PROCEDURE — 85025 COMPLETE CBC W/AUTO DIFF WBC: CPT

## 2023-04-11 LAB
BASOPHILS # BLD AUTO: 0.7 % (ref 0–1.8)
BASOPHILS # BLD: 0.03 K/UL (ref 0–0.12)
EOSINOPHIL # BLD AUTO: 0.21 K/UL (ref 0–0.51)
EOSINOPHIL NFR BLD: 4.6 % (ref 0–6.9)
ERYTHROCYTE [DISTWIDTH] IN BLOOD BY AUTOMATED COUNT: 47.8 FL (ref 35.9–50)
HCT VFR BLD AUTO: 47.4 % (ref 42–52)
HGB BLD-MCNC: 15.1 G/DL (ref 14–18)
IMM GRANULOCYTES # BLD AUTO: 0.02 K/UL (ref 0–0.11)
IMM GRANULOCYTES NFR BLD AUTO: 0.4 % (ref 0–0.9)
LYMPHOCYTES # BLD AUTO: 1.22 K/UL (ref 1–4.8)
LYMPHOCYTES NFR BLD: 26.6 % (ref 22–41)
MCH RBC QN AUTO: 30.4 PG (ref 27–33)
MCHC RBC AUTO-ENTMCNC: 31.9 G/DL (ref 33.7–35.3)
MCV RBC AUTO: 95.6 FL (ref 81.4–97.8)
MONOCYTES # BLD AUTO: 0.56 K/UL (ref 0–0.85)
MONOCYTES NFR BLD AUTO: 12.2 % (ref 0–13.4)
NEUTROPHILS # BLD AUTO: 2.54 K/UL (ref 1.82–7.42)
NEUTROPHILS NFR BLD: 55.5 % (ref 44–72)
NRBC # BLD AUTO: 0 K/UL
NRBC BLD-RTO: 0 /100 WBC
PLATELET # BLD AUTO: 302 K/UL (ref 164–446)
PMV BLD AUTO: 10 FL (ref 9–12.9)
RBC # BLD AUTO: 4.96 M/UL (ref 4.7–6.1)
WBC # BLD AUTO: 4.6 K/UL (ref 4.8–10.8)

## 2023-04-27 DIAGNOSIS — G40.209 PARTIAL EPILEPSY WITH IMPAIRMENT OF CONSCIOUSNESS (HCC): ICD-10-CM

## 2023-04-27 DIAGNOSIS — G40.219 INTRACTABLE FOCAL EPILEPSY WITH IMPAIRMENT OF CONSCIOUSNESS (HCC): ICD-10-CM

## 2023-04-27 RX ORDER — CENOBAMATE 50MG-100MG
KIT ORAL
Qty: 28 EACH | Refills: 0 | Status: CANCELLED | OUTPATIENT
Start: 2023-04-27 | End: 2023-05-25

## 2023-04-27 NOTE — TELEPHONE ENCOUNTER
Received request via: Patient    Was the patient seen in the last year in this department? Yes    Does the patient have an active prescription (recently filled or refills available) for medication(s) requested? Yes.     Does the patient have detention Plus and need 100 day supply (blood pressure, diabetes and cholesterol meds only)? Patient does not have SCP    Pt requesting refill a few days early per pharmacy so there is no delay

## 2023-05-01 DIAGNOSIS — G40.219 INTRACTABLE FOCAL EPILEPSY WITH IMPAIRMENT OF CONSCIOUSNESS (HCC): ICD-10-CM

## 2023-05-01 DIAGNOSIS — G40.209 PARTIAL EPILEPSY WITH IMPAIRMENT OF CONSCIOUSNESS (HCC): ICD-10-CM

## 2023-05-01 RX ORDER — CENOBAMATE 50MG-100MG
100 KIT ORAL
Qty: 28 EACH | Refills: 0 | Status: SHIPPED
Start: 2023-05-01 | End: 2023-05-04

## 2023-05-04 DIAGNOSIS — G40.219 INTRACTABLE FOCAL EPILEPSY WITH IMPAIRMENT OF CONSCIOUSNESS (HCC): ICD-10-CM

## 2023-05-08 ENCOUNTER — TELEMEDICINE (OUTPATIENT)
Dept: NEUROLOGY | Facility: MEDICAL CENTER | Age: 63
End: 2023-05-08
Attending: STUDENT IN AN ORGANIZED HEALTH CARE EDUCATION/TRAINING PROGRAM
Payer: MEDICARE

## 2023-05-08 DIAGNOSIS — J44.9 CHRONIC OBSTRUCTIVE PULMONARY DISEASE, UNSPECIFIED COPD TYPE (HCC): ICD-10-CM

## 2023-05-08 DIAGNOSIS — K13.79 HYPERTROPHY OF UVULA: ICD-10-CM

## 2023-05-08 DIAGNOSIS — G47.19 EXCESSIVE DAYTIME SLEEPINESS: ICD-10-CM

## 2023-05-08 DIAGNOSIS — R25.1 TREMOR: ICD-10-CM

## 2023-05-08 DIAGNOSIS — G40.219 INTRACTABLE FOCAL EPILEPSY WITH IMPAIRMENT OF CONSCIOUSNESS (HCC): ICD-10-CM

## 2023-05-08 DIAGNOSIS — G47.30 SLEEP APNEA, UNSPECIFIED TYPE: ICD-10-CM

## 2023-05-08 DIAGNOSIS — G25.1 TREMOR DUE TO MULTIPLE DRUGS: ICD-10-CM

## 2023-05-08 DIAGNOSIS — Z91.89 AT RISK FOR SLEEP APNEA: ICD-10-CM

## 2023-05-08 DIAGNOSIS — G25.2 KINETIC TREMOR: ICD-10-CM

## 2023-05-08 PROCEDURE — 99215 OFFICE O/P EST HI 40 MIN: CPT | Mod: 95 | Performed by: STUDENT IN AN ORGANIZED HEALTH CARE EDUCATION/TRAINING PROGRAM

## 2023-05-08 RX ORDER — CENOBAMATE 150 MG/1
150 TABLET, FILM COATED ORAL NIGHTLY
Qty: 14 TABLET | Refills: 0 | Status: SHIPPED | OUTPATIENT
Start: 2023-05-08 | End: 2023-05-22

## 2023-05-08 RX ORDER — CENOBAMATE 200 MG/1
200 TABLET, FILM COATED ORAL
Qty: 30 TABLET | Refills: 1 | Status: SHIPPED
Start: 2023-05-23 | End: 2023-07-12

## 2023-05-08 NOTE — PROGRESS NOTES
Telemedicine: Established Patient   This evaluation was conducted via Zoom using secure and encrypted videoconferencing technology. The patient was in their home in the [facility].    The patient's identity was confirmed and verbal consent was obtained for this virtual visit.      NEUROLOGY FOLLOW-UP - 05/08/2023   REFERRING PROVIDER  Fredy Franklin M.D.  [address]  [place] [address]    PCP  Sheri Weathers   [phone]   [facility] [[id number]]     REASON FOR VISIT: Fredy Zavala 62 y.o. male presents today for follow-up.     SUMMARY OF PROBLEMS AND/OR DIAGNOSES AS PER MY PRIOR NOTES/ENCOUNTERS:    INTERVAL HISTORY:  Fredy brianrikkins for close follow-up. He was last seen about a month ago on 4/6/2023.  He continues to do very very well with the transition/up titration of Xcopri and down titration of his other antiseizure medications.  He continues to have fewer and fewer seizures overall.  In the past 5 weeks he has had 1-2 seizures, the last of which was about 2 days ago, at the time when he has been therapeutic on Xcopri 100 mg nightly, Vimpat 100 mg twice daily, Aptiom 1600 mg nightly.  Mood is good.  He is feeling ready to continue the up titration and transition off one or more of the other medications if able to.        Patient's PMH, PSH, FH, and SH were reviewed.  ROS: All review of systems complete and are negative except as documented    CURRENT MEDICATIONS AT THE TIME OF THIS ENCOUNTER:    Current Outpatient Medications:     Xcopri, 150 mg, Oral, Nightly    [START ON 5/23/2023] Xcopri, 200 mg, Oral, QHS    cenobamate, 100 mg, Oral, DAILY    pravastatin, 40 mg, Oral, Nightly    omeprazole,     lacosamide, Take 200 mg in the morning, 200 mg + 100 mg in the evening.Take 200 mg in the morning, 200 mg + 100 mg in the evening.    lacosamide, 100 mg, Oral, DAILY    eslicarbazepine, TAKE TWO TABLETS BY MOUTH EVERY EVENINGTAKE    albuterol, USE 2 INHALATIONS EVERY 6 HOURS AS  NEEDED FOR SHORTNESS OF BREATH    Trelegy Ellipta,      EXAM:   Encounter Vitals  There were no vitals taken for this visit.           Physical Exam:  Physical Exam  Constitutional:       General: He is awake. He is not in acute distress.     Appearance: Normal appearance.   Neurological:      Mental Status: He is alert.   Psychiatric:         Speech: Speech normal.      Neurological Exam   Neurological Exam  Mental Status  Awake and alert. Oriented only to person, place and situation. Speech is normal. Language is fluent with no aphasia.     ALL DATA (I.e. labs, procedures, imaging, reports, clinical notes, etc.) FROM [facility] AND/OR OUTSIDE SOURCES, IF AVAILABLE, PERSONALLY REVIEWED:       ASSESSMENT, EDUCATION, AND COUNSELING:  This is a 62 y.o. male patient who presents to the neurology clinic. We had an extensive discussion about the patient's symptoms, signs, and work-up to date, if any. We discussed potential and/or definitive diagnoses, work-up, and potential treatments.       PLAN:  If applicable, the work-up such as labs, imaging, procedures, and/or other testing, referrals, and/or recommended treatment strategies are listed below.    Visit Diagnoses     ICD-10-CM   1. 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 Excessive daytime sleepiness  G47.19   5. 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 Sleep apnea, unspecified type  G47.30   8. Chronic obstructive pulmonary disease, unspecified COPD type ([facility])  J44.9   9. 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  As we uptitrate Xcopri we will continue to very cautiously and systematically decrease/taper off one of his other medications.  Specifically we will increase his Xcopri to  150, then to 200 mg, then taper off the remaining Vimpat, then start decreasing Aptiom from 1600 mg QD to 1200 QD.  The schedule will be as follows:    Week 1: Increase Xcopri from 100 mg nightly to 150 mg nightly . Continue Vimpat 100 mg twice/day. Continue Aptiom 1600 mg/day.   Week 2: Continue Xcopri at 150 mg nightly. Decrease vimpat from 100 mg twice/day to 100 mg once at night. Continue Aptiom 1600 mg/day.  Week 3:  Increase Xcopri to 200 mg nightly. Continue vimpat 100 mg once/night. Continue Aptiom 1600 mg per day.  Week 4: Continue Xcopri to 200 mg nightly. Stop vimpat altogether. Continue Aptiom at 1600 mg.  Week 5: Continue Xcopri 200 mg nightly. Decrease Aptiom from 1600 mg daily to 1200 mg daily.        Xcopri refilled as above to allow for next phase of titration.    BILLING DOCUMENTATION:     I spent a total of I spent a total of 40 minutes on the day of the visit.   of face-to-face time in this visit. Over 50% of the time of the visit today was spent on counseling and/or coordination of care wtih the patient and/or family, as above in assessment in plan.    Fredy Franklin MD  Department of Neurology at [facility]  Diplomate of the American Board of Psychiatry and Neurology, General Neurology  Diplomate of American Board of Psychiatry and Neurology, a Member Board of the American Board of Medical Subspecialties, Epilepsy  Director of [facility]'s Level III Comprehensive Epilepsy Program  Professor of Clinical Neurology, [employer].   [address], [address]  [facility] [id number] [phone]   Fax: [phone]  E-mail: [email or web address]

## 2023-05-11 DIAGNOSIS — G40.219 INTRACTABLE FOCAL EPILEPSY WITH IMPAIRMENT OF CONSCIOUSNESS (HCC): ICD-10-CM

## 2023-05-11 DIAGNOSIS — G40.209 PARTIAL EPILEPSY WITH IMPAIRMENT OF CONSCIOUSNESS (HCC): ICD-10-CM

## 2023-05-11 PROCEDURE — 1126F AMNT PAIN NOTED NONE PRSNT: CPT | Performed by: STUDENT IN AN ORGANIZED HEALTH CARE EDUCATION/TRAINING PROGRAM

## 2023-05-12 ENCOUNTER — TELEPHONE (OUTPATIENT)
Dept: NEUROLOGY | Facility: MEDICAL CENTER | Age: 63
End: 2023-05-12
Payer: MEDICARE

## 2023-05-12 NOTE — TELEPHONE ENCOUNTER
Received request via: Pharmacy    Was the patient seen in the last year in this department? Yes    Does the patient have an active prescription (recently filled or refills available) for medication(s) requested? No    Does the patient have shelter Plus and need 100 day supply (blood pressure, diabetes and cholesterol meds only)? Medication is not for cholesterol, blood pressure or diabetes

## 2023-05-23 DIAGNOSIS — E78.5 DYSLIPIDEMIA: ICD-10-CM

## 2023-05-23 RX ORDER — PRAVASTATIN SODIUM 40 MG
40 TABLET ORAL NIGHTLY
Qty: 90 TABLET | Refills: 3 | Status: SHIPPED | OUTPATIENT
Start: 2023-05-23 | End: 2023-06-06

## 2023-05-23 NOTE — TELEPHONE ENCOUNTER
Received request via: Pharmacy    Was the patient seen in the last year in this department? Yes    Does the patient have an active prescription (recently filled or refills available) for medication(s) requested?  Pt is requesting a 90 day fill from [facility]. I updated this in patients chart.     Does the patient have halfway Plus and need 100 day supply (blood pressure, diabetes and cholesterol meds only)? Patient does not have SCP

## 2023-06-02 ENCOUNTER — HOSPITAL ENCOUNTER (OUTPATIENT)
Dept: LAB | Facility: MEDICAL CENTER | Age: 63
End: 2023-06-02
Attending: NURSE PRACTITIONER
Payer: MEDICARE

## 2023-06-02 DIAGNOSIS — E56.9 VITAMIN DEFICIENCY: ICD-10-CM

## 2023-06-02 DIAGNOSIS — E78.5 DYSLIPIDEMIA: ICD-10-CM

## 2023-06-02 LAB
25(OH)D3 SERPL-MCNC: 47 NG/ML (ref 30–100)
CHOLEST SERPL-MCNC: 248 MG/DL (ref 100–199)
FASTING STATUS PATIENT QL REPORTED: NORMAL
HDLC SERPL-MCNC: 47 MG/DL
LDLC SERPL CALC-MCNC: 183 MG/DL
TRIGL SERPL-MCNC: 89 MG/DL (ref 0–149)

## 2023-06-02 PROCEDURE — 36415 COLL VENOUS BLD VENIPUNCTURE: CPT

## 2023-06-02 PROCEDURE — 80061 LIPID PANEL: CPT

## 2023-06-02 PROCEDURE — 82306 VITAMIN D 25 HYDROXY: CPT

## 2023-06-04 ENCOUNTER — PATIENT MESSAGE (OUTPATIENT)
Dept: MEDICAL GROUP | Facility: PHYSICIAN GROUP | Age: 63
End: 2023-06-04
Payer: MEDICARE

## 2023-06-04 DIAGNOSIS — E55.9 VITAMIN D DEFICIENCY: ICD-10-CM

## 2023-06-04 DIAGNOSIS — E78.5 DYSLIPIDEMIA: ICD-10-CM

## 2023-06-04 NOTE — PROGRESS NOTES
1. Vitamin D deficiency  Message received from lab to update ICD 10 from vitamin to vitamin D deficiency for billing under medicare  - VITAMIN D,25 HYDROXY (DEFICIENCY); Future

## 2023-06-06 RX ORDER — ATORVASTATIN CALCIUM 20 MG/1
20 TABLET, FILM COATED ORAL NIGHTLY
Qty: 90 TABLET | Refills: 1 | Status: SHIPPED | OUTPATIENT
Start: 2023-06-06 | End: 2023-07-12 | Stop reason: SDUPTHER

## 2023-06-07 NOTE — PROGRESS NOTES
1. Dyslipidemia  Switching from pravastatin to atorvastatin for better LDL management. Recheck lipid panel in 3 months    - atorvastatin (LIPITOR) 20 MG Tab; Take 1 Tablet by mouth every evening.  Dispense: 90 Tablet; Refill: 1

## 2023-06-16 DIAGNOSIS — G40.219 INTRACTABLE FOCAL EPILEPSY WITH IMPAIRMENT OF CONSCIOUSNESS (HCC): ICD-10-CM

## 2023-06-16 DIAGNOSIS — G40.209 PARTIAL EPILEPSY WITH IMPAIRMENT OF CONSCIOUSNESS (HCC): ICD-10-CM

## 2023-06-19 ENCOUNTER — TELEPHONE (OUTPATIENT)
Dept: NEUROLOGY | Facility: MEDICAL CENTER | Age: 63
End: 2023-06-19
Payer: MEDICARE

## 2023-06-19 RX ORDER — LACOSAMIDE 100 MG/1
TABLET ORAL
Qty: 90 TABLET | Refills: 1 | Status: SHIPPED | OUTPATIENT
Start: 2023-06-19 | End: 2023-06-20

## 2023-06-19 NOTE — TELEPHONE ENCOUNTER
Lacosamide (Vimpat) 100 MG Tabs    We are NOT contracted with this plan - Pharmacy Not Contracted in [employer] [facility] [employer] - Will have liaison Liza Valente release - 06/19/2023 1:56pm

## 2023-06-20 ENCOUNTER — TELEMEDICINE (OUTPATIENT)
Dept: NEUROLOGY | Facility: MEDICAL CENTER | Age: 63
End: 2023-06-20
Attending: STUDENT IN AN ORGANIZED HEALTH CARE EDUCATION/TRAINING PROGRAM
Payer: COMMERCIAL

## 2023-06-20 ENCOUNTER — TELEPHONE (OUTPATIENT)
Dept: NEUROLOGY | Facility: MEDICAL CENTER | Age: 63
End: 2023-06-20

## 2023-06-20 DIAGNOSIS — G40.219 INTRACTABLE FOCAL EPILEPSY WITH IMPAIRMENT OF CONSCIOUSNESS (HCC): ICD-10-CM

## 2023-06-20 DIAGNOSIS — G40.209 PARTIAL EPILEPSY WITH IMPAIRMENT OF CONSCIOUSNESS (HCC): ICD-10-CM

## 2023-06-20 DIAGNOSIS — K13.79 HYPERTROPHY OF UVULA: ICD-10-CM

## 2023-06-20 DIAGNOSIS — G25.2 CEREBELLAR TREMOR: ICD-10-CM

## 2023-06-20 DIAGNOSIS — R25.1 TREMOR: ICD-10-CM

## 2023-06-20 DIAGNOSIS — J44.9 CHRONIC OBSTRUCTIVE PULMONARY DISEASE, UNSPECIFIED COPD TYPE (HCC): ICD-10-CM

## 2023-06-20 DIAGNOSIS — E55.9 VITAMIN D DEFICIENCY: ICD-10-CM

## 2023-06-20 DIAGNOSIS — G25.2 KINETIC TREMOR: ICD-10-CM

## 2023-06-20 DIAGNOSIS — G25.1 TREMOR DUE TO MULTIPLE DRUGS: ICD-10-CM

## 2023-06-20 DIAGNOSIS — G47.30 SLEEP APNEA, UNSPECIFIED TYPE: ICD-10-CM

## 2023-06-20 DIAGNOSIS — Z91.89 AT RISK FOR SLEEP APNEA: ICD-10-CM

## 2023-06-20 DIAGNOSIS — E78.5 DYSLIPIDEMIA: ICD-10-CM

## 2023-06-20 DIAGNOSIS — Z85.46 HISTORY OF PROSTATE CANCER: ICD-10-CM

## 2023-06-20 DIAGNOSIS — R73.01 ELEVATED FASTING GLUCOSE: ICD-10-CM

## 2023-06-20 DIAGNOSIS — G47.19 EXCESSIVE DAYTIME SLEEPINESS: ICD-10-CM

## 2023-06-20 PROCEDURE — 99215 OFFICE O/P EST HI 40 MIN: CPT | Mod: 95 | Performed by: STUDENT IN AN ORGANIZED HEALTH CARE EDUCATION/TRAINING PROGRAM

## 2023-06-20 RX ORDER — LACOSAMIDE 50 MG/1
TABLET ORAL
Qty: 87 TABLET | Refills: 0 | Status: SHIPPED | OUTPATIENT
Start: 2023-06-20 | End: 2023-06-22 | Stop reason: SDUPTHER

## 2023-06-20 RX ORDER — CENOBAMATE 150 MG/1
300 TABLET, FILM COATED ORAL NIGHTLY
Qty: 60 TABLET | Refills: 3 | Status: SHIPPED | OUTPATIENT
Start: 2023-07-01 | End: 2023-07-12 | Stop reason: SDUPTHER

## 2023-06-20 NOTE — PATIENT INSTRUCTIONS
NEUROLOGY CLINIC VISIT WITH DR. FRANKLIN       PATIENT EXPECTATIONS AND IMPORTANT APPOINTMENT REMINDERS:   You matter to Dr. Franklin and you deserve the best care.   Dr. Franklin prides himself on providing the best possible care to all his patients. He strives to make each appointment meaningful, so that all your concerns are being addressed and all your neurological problems are being optimally treated. In order to achieve these goals for everyone, Dr. Franklin has listed important reminders and the best ways to prepare for each appointment. Please read each item carefully. Thank you!    REFILLS:   Request refills AT LEAST 1 week in advance to ensure you do not run out of medications    [employer] [facility]  It is STRONGLY encouraged that ALL patients sign up for [employer]. It is BY FAR the fastest and most convenient way for both Dr. Franklin and patients to obtain timely refills.  If you are having trouble signing up or logging into your account, staff are available to help you. Please ask a medical assistant or staff at the  to assist you.    TEST RESULS:   All labs and diagnostic test results will be reviewed at your next visit, UNLESS  Dr. Franklin determines that there are important findings on the tests need to be acted on sooner. Dr. Franklin will either call or send a message through [employer] if this is the case.    BE PREPARED PRIOR TO EVERY APPOINTMENT:  All patient are responsible for ensuring that ALL test results that were completed outside of the [facility] [facility] system have been received by our Neurology Department PRIOR to your appointment with Dr. Franklin.    IMPORTANT:  ALL images (not just the reports) must be sent and uploaded to the [facility] system. Dr. Franklin reviews all images personally prior to each visit. Ensuring that ALL the test results and test images are accessible to Dr. Franklin prior to your appointment is YOUR responsibility and an important part of making the most out of each appointment.   Bring a  [facility] picture ID and an updated insurance card EVERY visit.  It is highly recommended that you bring at every visit a list of the most important topics that you want address. While it may not be possible to address all items on the list in a single visit, preparing a list will ensure that Dr. Franklin addresses the items that are most important to you and your health    PAPERWORK, DOCUMENTATION, LETTER REQUESTS:  You must notify the office ahead of your appointment of all paperwork or letter requests.   Please DO NOT wait until the last minute to make these requests. Please give all paperwork to the medical assistant at the start of the appointment and check-in process. Please note that Dr. Franklin may not be able complete some types of documentation in a single appointment or even within a single day or week. This is why it is important to communicate paperwork requests prior to your appointment and at least 2 weeks prior to any deadlines.    KNOW ALL YOU MEDICATIONS:   AT EVERY SINGLE APPOINTMENT, please bring a list of every single prescribed, non-prescribed, and over the counter medication or supplements you are taking, including ones taken on a rare or intermittent basis.  Include the following information for each prescribed or non-prescribed medications:  Name of medication   The strength of EACH pill/capsule/tablet, etc.   The number of pills/capsules/tablets, etc taken per dose  The number and time of day that doses are taken  For every single Supplement that you take on a routine or intermittent basis, you must include:  The Brand Name   A complete list of every single ingredient, compound, vitamin, and/or mineral in each dose, along with the corresponding amounts/strengths of all ingredients, vitamins, minerals, etc., if such information is provided or known  The number of doses taken per day and time of day doses are taken  If medications are taken on an intermittent or as needed basis, please  "estimate how many days per week or days per month the medications are used  DO NOT just print out your medication list from [email or web address] or bring a list from a prior appointment or hospitalizations because the information is often often unreliable, inaccurate, outdated, and/or incomplete   The list should be printed or written  If you forget or do not have a list of all the medication, then it is acceptable, although less preferred, to bring all the bottles to the appointment     ARRIVE EARLY FOR ALL VISITS:  Please note that we are unable to accommodate late arrivals as per office policy.  YOU-the patient - (NOT a parent, spouse, or friend) must be physically present at check-in no later than 12 minutes after the scheduled appointment time, or you will be asked to reschedule   Consider scheduling a virtual appointment with Dr. Franklin through [email or web address] as an alternative if transportation to the clinic is difficult or unavailable   Please note, however, that virtual visits can only be scheduled after being an established patient of Dr. Franklin. All new appointments must be done in-person in clinic  Some insurances will not cover the cost of virtual appointments. Please check with your insurance to find out if these visits are covered    COMMUNICATING URGENT AND NON-URGENT MATTERS:  Your concerns are important and deserve to be heard and addressed. If you have an urgent matter, there are two methods that will ensure your concerns are prioritized appropriately:   Preferred method: Sign-up/Login to your [email or web address] account and send a message addressed to Dr. Franklin or Margret David (Dr. Franklin's assistant). In the subject line, type \"urgent\" followed by a word or phrase describing the situation (For example, write \"Urgent: Out of antiseuzre med and need refill\" or \"Urgent: Severe side effects to new meds\". In doing this, our staff can ensure urgent messages are triaged appropriately and communicated to Dr. Franklin that day.  Call " [facility] main line at [phone]. Dr. Franklin's voicemail extension is [phone]. When leaving a voice message, specifically indicate if it is urgent (or non-urgent) so that the matter can be triaged appropriately and addressed in a timely manner    Thank you for taking important action in your care!

## 2023-06-20 NOTE — TELEPHONE ENCOUNTER
Lacosamiide (Vimpat) 50 MG Tabs    Request rec'd via [facility], RTILANA Rob paid copay $15.39 #87/30 DS notifying liaison Liza Valente or Outreach - 06/20/2023 3:35pm

## 2023-06-20 NOTE — PROGRESS NOTES
Telemedicine: Established Patient   This evaluation was conducted via Zoom using secure and encrypted videoconferencing technology. The patient was in their home in the [facility].    The patient's identity was confirmed and verbal consent was obtained for this virtual visit.     NEUROLOGY FOLLOW-UP - 06/20/2023   REFERRING PROVIDER  Fredy Franklin M.D.  [address] [facility]  [place] [address]    PCP  Sheri Weathers   [phone]   [facility] [[id number]]     REASON FOR VISIT: Fredy Mueller Lucas 63 y.o. male presents today for follow-up.     SUMMARY OF PROBLEMS AND/OR DIAGNOSES AS PER MY PRIOR NOTES/ENCOUNTERS:    INTERVAL HISTORY:      Doing well during Weeks 1-3 after our last visit. No seizures at during that time.    However with tapering off the Vimpat patient had a cluster of 4-5 seizures over a couple days, similar to his prior which are focal unaware seizures with some postictal confusion.  Aptiom was also decreased to 800 mg nightly and he had a couple more seizures.  He resumed taking Vimpat 100 mg nightly and as of the past several days there have been no seizures.  Aside from this, patient has no complaints in regards to side effects.  Overall his seizures continue to decrease in frequency despite some breakthrough seizures in the setting of these medication changes.  Labs were done in April and I personally reviewed them.  They look reassuring.      CURRENT MEDICATIONS AT THE TIME OF THIS ENCOUNTER:    Current Outpatient Medications:     lacosamide, Take 1 Tablet by mouth 2 times a day for 40 days, THEN 0.5 Tablets 2 times a day for 7 days. Vimpat taper    [START ON 7/1/2023] Xcopri, 300 mg, Oral, Nightly    atorvastatin, 20 mg, Oral, Nightly, Taking    Xcopri, 200 mg, Oral, QHS, Taking    cenobamate, 100 mg, Oral, DAILY, Taking    albuterol, USE 2 INHALATIONS EVERY 6 HOURS AS NEEDED FOR SHORTNESS OF BREATH, PRN    Trelegy Ellipta, , Taking    eslicarbazepine, TAKE TWO TABLETS  BY MOUTH EVERY EVENING     EXAM:   Encounter Vitals  There were no vitals taken for this visit.           Physical Exam:  Physical Exam   Neurological Exam   Neurological Exam     ALL DATA (I.e. labs, procedures, imaging, reports, clinical notes, etc.) FROM [facility] AND/OR OUTSIDE SOURCES, IF AVAILABLE, PERSONALLY REVIEWED:       ASSESSMENT, EDUCATION, AND COUNSELING:  This is a 63 y.o. male patient who presents to the neurology clinic. We had an extensive discussion about the patient's symptoms, signs, and work-up to date, if any. We discussed potential and/or definitive diagnoses, work-up, and potential treatments.       PLAN:  If applicable, the work-up such as labs, imaging, procedures, and/or other testing, referrals, and/or recommended treatment strategies are listed below.    Visit Diagnoses     ICD-10-CM   1. 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 At risk for sleep apnea  Z91.89   5. Excessive daytime sleepiness  G47.19   6. Hypertrophy of uvula  K13.79   7. 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 Sleep apnea, unspecified type  G47.30   9. Chronic obstructive pulmonary disease, unspecified COPD type ([facility])  J44.9   10. History of prostate cancer  Z85.46   11. Vitamin D deficiency  E55.9   12. 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 Tremor  R25.1   14. Dyslipidemia  E78.5   15. Elevated fasting glucose  R73.01        Orders Placed This Encounter    lacosamide (VIMPAT) 50 MG Tab tablet    Cenobamate (XCOPRI) 150 MG Tab      Patient with intractable focal epilepsy who overall continues to do well with the Xcopri transition although did have some breakthrough seizures this past month.  Discussed with patient and his wife Jovita about a plan over the course of next 6 weeks which is detailed below.  We will continue to follow close follow-up in the next 5 to 6 weeks virtually.      For the  next 10 days starting today (approximately 2 weeks follow since increasing Xcopri from 200 to 250 mg nightly):   No changes to the medication regiment other than dividing the Vimpat and to divided doses of 50 mg twice per day.  Continue Aptiom 800 mg daily.  Week 3-4:   Increase Xcopri to 300 mg nightly.  Continue other antiseizure medications unchanged.  Specifically, Vimpat will remain at 50 mg twice per day and Aptiom will remain at 800 mg nightly.  Week 5:   Continue Xcopri 300 mg nightly.  Decrease Vimpat to 25 mg twice per day.  No changes to Aptiom 800 mg nightly.  Week 6:   Stop Vimpat altogether.  Continue Xcopri 300 mg nightly.  Continue Aptiom 800 mg nightly.  Next medication adjustments will be determined at her next appointment.          BILLING DOCUMENTATION:     I spent a total of I spent a total of 50 minutes on the day of the visit.   of face-to-face time in this visit. Over 50% of the time of the visit today was spent on counseling and/or coordination of care wtih the patient and/or family, as above in assessment in plan.    Fredy Franklin MD  Department of Neurology at [facility]  Diplomate of the American Board of Psychiatry and Neurology, General Neurology  Diplomate of American Board of Psychiatry and Neurology, a Member Board of the American Board of Medical Subspecialties, Epilepsy  Director of [facility]'s Level III Comprehensive Epilepsy Program  Professor of Clinical Neurology, [facility].   [address], [address]  [facility] [id number] [phone]   Fax: [phone]  E-mail: [email or web address]

## 2023-06-22 DIAGNOSIS — G40.219 INTRACTABLE FOCAL EPILEPSY WITH IMPAIRMENT OF CONSCIOUSNESS (HCC): ICD-10-CM

## 2023-06-22 DIAGNOSIS — G40.209 PARTIAL EPILEPSY WITH IMPAIRMENT OF CONSCIOUSNESS (HCC): ICD-10-CM

## 2023-06-26 RX ORDER — LACOSAMIDE 50 MG/1
TABLET ORAL
Qty: 21 TABLET | Refills: 0 | Status: SHIPPED | OUTPATIENT
Start: 2023-06-26 | End: 2023-07-10

## 2023-06-26 RX ORDER — LACOSAMIDE 50 MG/1
50 TABLET ORAL DAILY
Qty: 7 TABLET | Refills: 0 | Status: CANCELLED | OUTPATIENT
Start: 2023-06-26 | End: 2023-07-03

## 2023-06-26 NOTE — TELEPHONE ENCOUNTER
Pharmacy called, states Vimpat tablet should not be broken in half. Rx provided for final week of taper, 50 mg once daily.

## 2023-07-11 ENCOUNTER — TELEMEDICINE (OUTPATIENT)
Dept: NEUROLOGY | Facility: MEDICAL CENTER | Age: 63
End: 2023-07-11
Attending: STUDENT IN AN ORGANIZED HEALTH CARE EDUCATION/TRAINING PROGRAM
Payer: MEDICARE

## 2023-07-11 ENCOUNTER — TELEPHONE (OUTPATIENT)
Dept: NEUROLOGY | Facility: MEDICAL CENTER | Age: 63
End: 2023-07-11

## 2023-07-11 DIAGNOSIS — Z91.89 AT RISK FOR SLEEP APNEA: ICD-10-CM

## 2023-07-11 DIAGNOSIS — J44.9 CHRONIC OBSTRUCTIVE PULMONARY DISEASE, UNSPECIFIED COPD TYPE (HCC): ICD-10-CM

## 2023-07-11 DIAGNOSIS — E55.9 VITAMIN D DEFICIENCY: ICD-10-CM

## 2023-07-11 DIAGNOSIS — K13.79 HYPERTROPHY OF UVULA: ICD-10-CM

## 2023-07-11 DIAGNOSIS — G47.30 SLEEP APNEA, UNSPECIFIED TYPE: ICD-10-CM

## 2023-07-11 DIAGNOSIS — R25.1 TREMOR: ICD-10-CM

## 2023-07-11 DIAGNOSIS — E78.5 DYSLIPIDEMIA: ICD-10-CM

## 2023-07-11 DIAGNOSIS — R73.01 ELEVATED FASTING GLUCOSE: ICD-10-CM

## 2023-07-11 DIAGNOSIS — Z85.46 HISTORY OF PROSTATE CANCER: ICD-10-CM

## 2023-07-11 DIAGNOSIS — G25.2 CEREBELLAR TREMOR: ICD-10-CM

## 2023-07-11 DIAGNOSIS — G25.2 KINETIC TREMOR: ICD-10-CM

## 2023-07-11 DIAGNOSIS — G40.219 INTRACTABLE FOCAL EPILEPSY WITH IMPAIRMENT OF CONSCIOUSNESS (HCC): ICD-10-CM

## 2023-07-11 PROCEDURE — G2212 PROLONG OUTPT/OFFICE VIS: HCPCS | Mod: 95 | Performed by: STUDENT IN AN ORGANIZED HEALTH CARE EDUCATION/TRAINING PROGRAM

## 2023-07-11 PROCEDURE — 99215 OFFICE O/P EST HI 40 MIN: CPT | Mod: 95 | Performed by: STUDENT IN AN ORGANIZED HEALTH CARE EDUCATION/TRAINING PROGRAM

## 2023-07-11 PROCEDURE — 999999 HB NO CHARGE: Performed by: STUDENT IN AN ORGANIZED HEALTH CARE EDUCATION/TRAINING PROGRAM

## 2023-07-11 RX ORDER — CLONAZEPAM 1 MG/1
.5-1 TABLET ORAL 2 TIMES DAILY PRN
Qty: 60 TABLET | Refills: 0 | Status: SHIPPED | OUTPATIENT
Start: 2023-07-11 | End: 2023-07-12 | Stop reason: SDUPTHER

## 2023-07-11 NOTE — PROGRESS NOTES
Telemedicine: Established Patient   This evaluation was conducted via Zoom then to [facility] using secure and encrypted videoconferencing technology. The patient was in their home in the [facility].    The patient's identity was confirmed and verbal consent was obtained for this virtual visit.     NEUROLOGY FOLLOW-UP - 07/11/2023       REASON FOR VISIT: Patient with intractable focal epilepsy who overall continues to do well with the Xcopri transition although did have some breakthrough seizures around May/Susan.     SUMMARY OF PROBLEMS AND/OR DIAGNOSES AS PER MY PRIOR NOTES/ENCOUNTERS:        INTERVAL HISTORY:        Last visit June 20, 2023 the following plan was made:     Xcopri  increased from 200 to 250 and from 250 to 300 mg QHS.       Increased to Xcopri 300 mg June 30. He remains on vimpat 50 mg BID with plan to decrease to 25 mg BID. He remains on Aptiom 800 mg QHS    Had typical focal unaware seizure on June 26 and again on July 4th with post ictal confusion of about 10 minutes.    Aside for the two seizures, he does feel a bit less energetic than usual but overall contin ues to tolerate the transition.      CURRENT MEDICATIONS AT THE TIME OF THIS ENCOUNTER:    Current Outpatient Medications:     [START ON 7/14/2023] eslicarbazepine, Take 3 Tablets by mouth every day for 14 days, THEN 2 Tablets every day for 14 days, THEN 1 Tablet every day for 14 days.    clonazePAM, 0.5-1 mg, Oral, BID PRN    Xcopri, 300 mg, Oral, Nightly, Taking    atorvastatin, 20 mg, Oral, Nightly, Taking    Xcopri, 200 mg, Oral, QHS, Taking    cenobamate, 100 mg, Oral, DAILY, Taking    albuterol, USE 2 INHALATIONS EVERY 6 HOURS AS NEEDED FOR SHORTNESS OF BREATH, PRN    Trelegy Ellipta, , Taking     EXAM:   Encounter Vitals  There were no vitals taken for this visit.           Physical Exam:  Physical Exam  Constitutional:       General: He is not in acute distress.     Appearance: He is not ill-appearing.   HENT:      Head:  Normocephalic and atraumatic.   Psychiatric:         Speech: Speech normal.        Neurological Exam   Neurological Exam  Mental Status  Awake, alert and oriented to person, place and time. Oriented to person, place and time. Speech is normal. Language is fluent with no aphasia. Attention and concentration are normal.    Cranial Nerves  CN VII: Full and symmetric facial movement.       ALL DATA (I.e. labs, procedures, imaging, reports, clinical notes, etc.) FROM [facility] AND/OR OUTSIDE SOURCES, IF AVAILABLE, PERSONALLY REVIEWED:     ASSESSMENT, EDUCATION, AND COUNSELING:  This is a 63 y.o. male patient who presents to the neurology clinic. We had an extensive discussion about the patient's symptoms, signs, and work-up to date, if any. We discussed potential and/or definitive diagnoses, work-up, and potential treatments.       PLAN:  If applicable, the work-up such as labs, imaging, procedures, and/or other testing, referrals, and/or recommended treatment strategies are listed below.    Visit Diagnoses     ICD-10-CM   1. 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 Dyslipidemia  E78.5   3. Chronic obstructive pulmonary disease, unspecified COPD type ([facility])  J44.9   4. 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 Vitamin D deficiency  E55.9   6. At risk for sleep apnea  Z91.89   7. Tremor  R25.1   8. Elevated fasting glucose  R73.01   9. 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 History of prostate cancer  Z85.46   11. Hypertrophy of uvula  K13.79   12. Sleep apnea, unspecified type  G47.30        Orders Placed This Encounter    eslicarbazepine (APTIOM) 200 MG Tab    clonazePAM (KLONOPIN) 1 MG Tab      Patient with intractable epilepsy who is slowly uptitrating Xcopri while weaning off his other antiseizure medications.  Thus far he is doing very well and has had a significant reduction in seizures though does still have breakthrough seizures, it seems in particular with any lowering of Vimpat.  We had an extensive  "discussion about the plan moving forward.  We ultimately decided to keep Fredy on low-dose Vimpat 50 mg twice daily for now.  We also discussed to keep him on Xcopri 300 mg nightly for now as well.  Rather than taper off the Vimpat, which has had a more meaningful effect on curbing his seizures, we decided to slowly taper off Aptiom which has never been beneficial for him.      Starting Friday, July 14, Fredy will decrease Aptiom from 800 to 600 mg for 2 weeks.  Then he will decrease Aptiom to 400 mg nightly for 2 weeks.  If he has had no seizures or has had only very minor seizure then he can continue tapering off the Aptiom by going down to 200 mg for 2 weeks and then stopping Aptiom altogether.      I have prescribed as needed clonazepam for him to use for any breakthrough seizures or if he needs a medication to help him bridge through the rest of this medication transition.  This was sent to the pharmacy.  200 mg Aptiom tabs with the titration schedule written on the script was sent also to the pharmacy.      Again, as noted, no changes to the Xcopri for now although we all agreed that we can have a low threshold to increase the Xcopri to 350 mg nightly.  Also no changes to Vimpat.  We will continue at 50 mg twice daily.  Also discussed the possibility of going back up on a higher dose of Vimpat at 100 mg twice daily while tapering off the Aptiom but we will hold off on doing this for now.  We will have very close follow-up in about 4 weeks at which point he will be midway through his Aptiom taper.  Fredy and his wife Jovita were instructed to call or send a [employer] message and indicate \"urgent\" if there is any sort of issue with this tapering process.  Specifically, if the has a cluster of seizures like he did back in early June/late May.    In the next couple months I like to see him back in clinic for complete neurological examination that should be updated.  He also needs updated labs around this time too. " However, before that time we will follow-up with another virtual visit in about 4 weeks.  Patient's wife and Fredy agreement with the plan as of outlined it.              BILLING DOCUMENTATION:     I spent a total of I spent a total of 80 minutes on the day of the visit.   of face-to-face time in this visit. Over 50% of the time of the visit today was spent on counseling and/or coordination of care wtih the patient and/or family, as above in assessment in plan.    Fredy Franklin MD  Department of Neurology at [facility]  Diplomate of the American Board of Psychiatry and Neurology, General Neurology  Diplomate of American Board of Psychiatry and Neurology, a Member Board of the American Board of Medical Subspecialties, Epilepsy  Director of [facility]'s Level III Comprehensive Epilepsy Program  Professor of Clinical Neurology, [employer] [facility].   [address], [address]  [facility] [id number] [phone]   Fax: [phone]  E-mail: [email or web address]

## 2023-07-11 NOTE — TELEPHONE ENCOUNTER
Eslicarbazepine (Aptiom) 200 MG Tabs    Request rec'd via [facility], RTILANA Mayers paid copay $85.00 #84/30 DS notifying liaison Liza Valente. - 07/11/2023 4:15pm

## 2023-07-11 NOTE — PATIENT INSTRUCTIONS
NEUROLOGY CLINIC VISIT WITH DR. FRANKLIN       PATIENT EXPECTATIONS AND IMPORTANT APPOINTMENT REMINDERS:   You matter to Dr. Franklin and you deserve the best care.   Dr. Franklin prides himself on providing the best possible care to all his patients. He strives to make each appointment meaningful, so that all your concerns are being addressed and all your neurological problems are being optimally treated. In order to achieve these goals for everyone, Dr. Franklin has listed important reminders and the best ways to prepare for each appointment. Please read each item carefully. Thank you!    REFILLS:   Request refills AT LEAST 1 week in advance to ensure you do not run out of medications    [employer]  It is STRONGLY encouraged that ALL patients sign up for [employer]. It is BY FAR the fastest and most convenient way for both Dr. Franklin and patients to obtain timely refills.  If you are having trouble signing up or logging into your account, staff are available to help you. Please ask a medical assistant or staff at the  to assist you.    TEST RESULS:   All labs and diagnostic test results will be reviewed at your next visit, UNLESS  Dr. Franklin determines that there are important findings on the tests need to be acted on sooner. Dr. Franklin will either call or send a message through [employer] if this is the case.    BE PREPARED PRIOR TO EVERY APPOINTMENT:  All patient are responsible for ensuring that ALL test results that were completed outside of the [employer] system have been received by our Neurology Department PRIOR to your appointment with Dr. Franklin.    IMPORTANT:  ALL images (not just the reports) must be sent and uploaded to the [employer] system. Dr. Franklin reviews all images personally prior to each visit. Ensuring that ALL the test results and test images are accessible to Dr. Franklin prior to your appointment is YOUR responsibility and an important part of making the most out of each appointment.   Bring a  [facility]-issues picture ID and an updated insurance card EVERY visit.  It is highly recommended that you bring at every visit a list of the most important topics that you want address. While it may not be possible to address all items on the list in a single visit, preparing a list will ensure that Dr. Franklin addresses the items that are most important to you and your health    PAPERWORK, DOCUMENTATION, LETTER REQUESTS:  You must notify the office ahead of your appointment of all paperwork or letter requests.   Please DO NOT wait until the last minute to make these requests. Please give all paperwork to the medical assistant at the start of the appointment and check-in process. Please note that Dr. Franklin may not be able complete some types of documentation in a single appointment or even within a single day or week. This is why it is important to communicate paperwork requests prior to your appointment and at least 2 weeks prior to any deadlines.    KNOW ALL YOU MEDICATIONS:   AT EVERY SINGLE APPOINTMENT, please bring a list of every single prescribed, non-prescribed, and over the counter medication or supplements you are taking, including ones taken on a rare or intermittent basis.  Include the following information for each prescribed or non-prescribed medications:  Name of medication   The strength of EACH pill/capsule/tablet, etc.   The number of pills/capsules/tablets, etc taken per dose  The number and time of day that doses are taken  For every single Supplement that you take on a routine or intermittent basis, you must include:  The Brand Name   A complete list of every single ingredient, compound, vitamin, and/or mineral in each dose, along with the corresponding amounts/strengths of all ingredients, vitamins, minerals, etc., if such information is provided or known  The number of doses taken per day and time of day doses are taken  If medications are taken on an intermittent or as needed basis, please  "estimate how many days per week or days per month the medications are used  DO NOT just print out your medication list from [facility] [employer] or bring a list from a prior appointment or hospitalizations because the information is often often unreliable, inaccurate, outdated, and/or incomplete   The list should be printed or written  If you forget or do not have a list of all the medication, then it is acceptable, although less preferred, to bring all the bottles to the appointment     ARRIVE EARLY FOR ALL VISITS:  Please note that we are unable to accommodate late arrivals as per office policy.  YOU-the patient - (NOT a parent, spouse, or friend) must be physically present at check-in no later than 12 minutes after the scheduled appointment time, or you will be asked to reschedule   Consider scheduling a virtual appointment with Dr. Franklin through [employer] as an alternative if transportation to the clinic is difficult or unavailable   Please note, however, that virtual visits can only be scheduled after being an established patient of Dr. Franklin. All new appointments must be done in-person in clinic  Some insurances will not cover the cost of virtual appointments. Please check with your insurance to find out if these visits are covered    COMMUNICATING URGENT AND NON-URGENT MATTERS:  Your concerns are important and deserve to be heard and addressed. If you have an urgent matter, there are two methods that will ensure your concerns are prioritized appropriately:   Preferred method: Sign-up/Login to your [employer] account and send a message addressed to Dr. Franklin or Margret David (Dr. Franklin's assistant). In the subject line, type \"urgent\" followed by a word or phrase describing the situation (For example, write \"Urgent: Out of antiseuzre med and need refill\" or \"Urgent: Severe side effects to new meds\". In doing this, our staff can ensure urgent messages are triaged appropriately and communicated to Dr. Franklin that day.  Call " [facility] main line at [phone]. Dr. Franklin's voicemail extension is [phone]. When leaving a voice message, specifically indicate if it is urgent (or non-urgent) so that the matter can be triaged appropriately and addressed in a timely manner    Thank you for taking important action in your care!

## 2023-07-12 DIAGNOSIS — E78.5 DYSLIPIDEMIA: ICD-10-CM

## 2023-07-12 PROCEDURE — RXMED WILLOW AMBULATORY MEDICATION CHARGE: Performed by: STUDENT IN AN ORGANIZED HEALTH CARE EDUCATION/TRAINING PROGRAM

## 2023-07-12 RX ORDER — ALBUTEROL SULFATE 90 UG/1
AEROSOL, METERED RESPIRATORY (INHALATION)
Qty: 8.5 G | Refills: 10 | Status: SHIPPED | OUTPATIENT
Start: 2023-07-12

## 2023-07-12 RX ORDER — ATORVASTATIN CALCIUM 20 MG/1
20 TABLET, FILM COATED ORAL NIGHTLY
Qty: 90 TABLET | Refills: 1 | Status: SHIPPED | OUTPATIENT
Start: 2023-07-12 | End: 2023-09-27 | Stop reason: SDUPTHER

## 2023-07-12 NOTE — TELEPHONE ENCOUNTER
Satish!    I'm helping Fredy Zavala transition their medications to [facility] on [address].  Would you please authorize these prescriptions?    Thank you!    Ivonne Bush

## 2023-07-13 ENCOUNTER — PHARMACY VISIT (OUTPATIENT)
Dept: PHARMACY | Facility: MEDICAL CENTER | Age: 63
End: 2023-07-13
Payer: COMMERCIAL

## 2023-07-13 PROCEDURE — RXMED WILLOW AMBULATORY MEDICATION CHARGE: Performed by: NURSE PRACTITIONER

## 2023-08-02 PROCEDURE — RXMED WILLOW AMBULATORY MEDICATION CHARGE: Performed by: STUDENT IN AN ORGANIZED HEALTH CARE EDUCATION/TRAINING PROGRAM

## 2023-08-08 ENCOUNTER — TELEMEDICINE (OUTPATIENT)
Dept: NEUROLOGY | Facility: MEDICAL CENTER | Age: 63
End: 2023-08-08
Attending: STUDENT IN AN ORGANIZED HEALTH CARE EDUCATION/TRAINING PROGRAM
Payer: MEDICARE

## 2023-08-08 ENCOUNTER — TELEPHONE (OUTPATIENT)
Dept: NEUROLOGY | Facility: MEDICAL CENTER | Age: 63
End: 2023-08-08

## 2023-08-08 DIAGNOSIS — G40.209 PARTIAL EPILEPSY WITH IMPAIRMENT OF CONSCIOUSNESS (HCC): ICD-10-CM

## 2023-08-08 DIAGNOSIS — G40.219 INTRACTABLE FOCAL EPILEPSY WITH IMPAIRMENT OF CONSCIOUSNESS (HCC): ICD-10-CM

## 2023-08-08 PROCEDURE — 99215 OFFICE O/P EST HI 40 MIN: CPT | Mod: 95 | Performed by: STUDENT IN AN ORGANIZED HEALTH CARE EDUCATION/TRAINING PROGRAM

## 2023-08-08 PROCEDURE — G2212 PROLONG OUTPT/OFFICE VIS: HCPCS | Mod: 95 | Performed by: STUDENT IN AN ORGANIZED HEALTH CARE EDUCATION/TRAINING PROGRAM

## 2023-08-08 RX ORDER — LACOSAMIDE 100 MG/1
100 TABLET ORAL 2 TIMES DAILY
Qty: 60 TABLET | Refills: 5 | Status: ON HOLD | OUTPATIENT
Start: 2023-08-08 | End: 2023-10-06

## 2023-08-08 RX ORDER — LACOSAMIDE 50 MG/1
50 TABLET ORAL 2 TIMES DAILY
COMMUNITY
End: 2023-08-08

## 2023-08-08 RX ORDER — CENOBAMATE 200 MG/1
400 TABLET, FILM COATED ORAL
Qty: 60 TABLET | Refills: 2 | Status: SHIPPED | OUTPATIENT
Start: 2023-08-08 | End: 2023-11-07 | Stop reason: SDUPTHER

## 2023-08-08 NOTE — PROGRESS NOTES
Telemedicine: Established Patient   This evaluation was conducted via Zoom using secure and encrypted videoconferencing technology. The patient was in their home in the [facility].    The patient's identity was confirmed and verbal consent was obtained for this virtual visit.     NEUROLOGY FOLLOW-UP - 08/08/2023     REASON FOR VISIT: Fredy Zavala 63 y.o. male presents today for follow-up.     SUMMARY PROBLEMS/MEDICAL CONDITIONS AND PRIOR MED HX:    INTERVAL HISTORY:    Patient returns with his wife for follow-up.    Had at least two seizures since we last Hatter last virtual visit little over a month ago.  He had one small focal unaware seizures.  He then had a second focal to bilateral tonic-clonic seizure, something that his wife had not seen in a while.  It was longer than his typical lasting approximately 90 seconds.  He had in a more protracted postictal recovery.  This occurred around July 30 soon after decreasing his Aptiom as instructed to per my last note.  Given that this was the second seizure in a couple of weeks and given that this was a more intense convulsive seizure, Fredy started taking the clonazepam 0.5 mg twice per day as I added instructed him to in the event that he had a breakthrough seizure such as this.  He has not had a seizure since being on the clonazepam and since his July 30 seizure.  He complains of excessive fatigue.    Patient is on Xcopri 300 mg QHS, vimpat 50 mg BID, clonazepam 0.5 mg twice per day since July 30, Aptiom 400 mg nightly.              CURRENT MEDICATIONS AT THE TIME OF THIS ENCOUNTER:  Current Outpatient Medications on File Prior to Visit   Medication Sig Dispense Refill    clonazePAM (KLONOPIN) 1 MG Tab Take 0.5-1 Tablets by mouth 2 times a day as needed (take 0.5 to 1 tab up twice per day as needed for breakthrough seizures) for up to 30 days. 60 Tablet 0    atorvastatin (LIPITOR) 20 MG Tab Take 1 Tablet by mouth every evening. 90 Tablet 1    albuterol  108 (90 Base) MCG/ACT Aero Soln inhalation aerosol USE 2 INHALATIONS EVERY 6 HOURS AS NEEDED FOR SHORTNESS OF BREATH 8.5 g 10    TRELEGY ELLIPTA 200-62.5-25 MCG/INH AEROSOL POWDER, BREATH ACTIVATED        No current facility-administered medications on file prior to visit.        EXAM:   Encounter Vitals  Standard Vitals     Pulmonary-Specific Vitals     Durable Medical Equipment-Specific Vitals                  Physical Exam:  Physical Exam  Constitutional:       General: He is not in acute distress.  Psychiatric:         Speech: Speech normal.        Neurological Exam   Neurological Exam  Mental Status  Drowsy. Speech is normal. Language is fluent with no aphasia.  .  Slightly more sedated than usual.  Slower to respond and answer slightly more than usual patient with intractable focal epilepsy.  Had only thought that he had one seizure.  Does not recall having the 2 seizures that his wife remembers..       PERTINENT NOTES AND DIAGNOSTICS DATA PERSONALLY REVIEW:      ASSESSMENT, EDUCATION, AND COUNSELING:  This is a 63 y.o. male patient who presents to the neurology clinic. We had an extensive discussion about the patient's symptoms, signs, and work-up to date, if any. We discussed potential and/or definitive diagnoses, work-up, and potential treatments.       PLAN:  If applicable, the work-up such as labs, imaging, procedures, and/or other testing, referrals, and/or recommended treatment strategies are listed below.    Visit Diagnoses     ICD-10-CM   1. 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 Partial epilepsy with impairment of consciousness (HCC)  G40.209        Orders Placed This Encounter    DISCONTD: lacosamide (VIMPAT) 50 MG Tab tablet    lacosamide (VIMPAT) 100 MG Tab tablet    Cenobamate (XCOPRI) 200 MG Tab    eslicarbazepine (APTIOM) 200 MG Tab      Patient with intractable focal epilepsy who had a focal to bilateral tonic-clonic seizure lasting 90 seconds, the  first in a long time, during antiseizure medication transitioning.  I did discuss my concern about Fredy having more these and the serious impact it can have on his health.  I recommend that we increase the Vimpat and further increase the Xcopri to its max dose, while continuing Aptiom without making any changes for now, and while cutting back on the 0.5 mg of clonazepam for 1 week before stopping it.  Detailed week by week plan is as below.    Week 1: Increase Xcopri from 300 to 350 mg/day.  Decrease clonazepam from 0.5 mg twice per day to 0.5 mg once per day at night.  Increase Vimpat from 50 mg twice per day to 100 mg twice per day.  Continue Aptiom at 400 mg/day unchanged.  Week 2: Continue with Xcopri at 350 mg/day.  Stop 0.5 mg of clonazepam once per day and only use it on an as-needed basis.  Continue with Vimpat at 100 mg twice per day.  Continue Aptiom at 400 mg/day.  Week 3: Increase Xcopri from 350 mg to 400 mg/day.  Continue Vimpat 100 mg/day.  Continue Aptiom 400 mg/day.  Continue to use clonazepam only at on an as needed basis.    I have refilled Vimpat, Aptiom, Xcopri.  We will follow-up in 6 to 8 weeks.  Discussed that we will make changes at our follow-up visit, preferably in person since it has been sometime that I get a updated comprehensive neurological exam, at her follow-up appointment.               BILLING DOCUMENTATION:     I spent a total of I spent a total of 75 minutes on the day of the visit.   of face-to-face time in this visit. Over 50% of the time of the visit today was spent on counseling and/or coordination of care wtih the patient and/or family, as above in assessment in plan.    Fredy Franklin MD  Department of Neurology at [facility]  Diplomate of the American Board of Psychiatry and Neurology, General Neurology  Diplomate of American Board of Psychiatry and Neurology, a Member Board of the American Board of Medical Subspecialties, Epilepsy  Director of  [facility] Level III Comprehensive Epilepsy Program  Professor of Clinical Neurology, [facility].   [address], [address]  [facility] [id number] [phone]   Fax: [phone]  E-mail: [email or web address]

## 2023-08-08 NOTE — TELEPHONE ENCOUNTER
Xcopri 200 MG Tabs    PA approved end date 08/07/2024, TC paid copay $20.00 #60/30 DS    Called into [facility] [phone] Rep Mar took info PA approved ref ID [id number] #60/30 DS good 07/09/2023 until 08/07/2024, TC paid copay $20.00 after $65.00 [employer] [facility] Voucher, please mention voucher to patient, notifying liliana Valente. - 08/08/2023 4:04pm    Aptiom (Eslicarbazepine) 200 MG Tabs    Request rec'd via [employer] TC paid copay $85.00 #60/30 DS notifying liliana Valente. - 08/08/2023 3:37pm    Lacosamide (Vimpat) 100 MG Tabs    Request rec'd via [employer] TC paid copay $16.09 #60/30 DS (we are NOT in their [employer]) notifying liliana Valente.  - 08/08/2023 3:34pm

## 2023-08-08 NOTE — PATIENT INSTRUCTIONS
NEUROLOGY CLINIC VISIT WITH DR. FRANKLIN       PATIENT EXPECTATIONS AND IMPORTANT APPOINTMENT REMINDERS:   You matter to Dr. Franklin and you deserve the best care.   Dr. Franklin prides himself on providing the best possible care to all his patients. He strives to make each appointment meaningful, so that all your concerns are being addressed and all your neurological problems are being optimally treated. In order to achieve these goals for everyone, Dr. Franklin has listed important reminders and the best ways to prepare for each appointment. Please read each item carefully. Thank you!    REFILLS:   Request refills AT LEAST 1 week in advance to ensure you do not run out of medications    [employer]  It is STRONGLY encouraged that ALL patients sign up for [employer]. It is BY FAR the fastest and most convenient way for both Dr. Franklin and patients to obtain timely refills.  If you are having trouble signing up or logging into your account, staff are available to help you. Please ask a medical assistant or staff at the  to assist you.    TEST RESULS:   All labs and diagnostic test results will be reviewed at your next visit, UNLESS  Dr. Franklin determines that there are important findings on the tests need to be acted on sooner. Dr. Franklin will either call or send a message through [employer] if this is the case.    BE PREPARED PRIOR TO EVERY APPOINTMENT:  All patient are responsible for ensuring that ALL test results that were completed outside of the [employer] system have been received by our Neurology Department PRIOR to your appointment with Dr. Franklin.    IMPORTANT:  ALL images (not just the reports) must be sent and uploaded to the [employer] system. Dr. Franklin reviews all images personally prior to each visit. Ensuring that ALL the test results and test images are accessible to Dr. Franklin prior to your appointment is YOUR responsibility and an important part of making the most out of each appointment.   Bring a  [facility]-issues picture ID and an updated insurance card EVERY visit.  It is highly recommended that you bring at every visit a list of the most important topics that you want address. While it may not be possible to address all items on the list in a single visit, preparing a list will ensure that Dr. Franklin addresses the items that are most important to you and your health    PAPERWORK, DOCUMENTATION, LETTER REQUESTS:  You must notify the office ahead of your appointment of all paperwork or letter requests.   Please DO NOT wait until the last minute to make these requests. Please give all paperwork to the medical assistant at the start of the appointment and check-in process. Please note that Dr. Franklin may not be able complete some types of documentation in a single appointment or even within a single day or week. This is why it is important to communicate paperwork requests prior to your appointment and at least 2 weeks prior to any deadlines.    KNOW ALL YOU MEDICATIONS:   AT EVERY SINGLE APPOINTMENT, please bring a list of every single prescribed, non-prescribed, and over the counter medication or supplements you are taking, including ones taken on a rare or intermittent basis.  Include the following information for each prescribed or non-prescribed medications:  Name of medication   The strength of EACH pill/capsule/tablet, etc.   The number of pills/capsules/tablets, etc taken per dose  The number and time of day that doses are taken  For every single Supplement that you take on a routine or intermittent basis, you must include:  The Brand Name   A complete list of every single ingredient, compound, vitamin, and/or mineral in each dose, along with the corresponding amounts/strengths of all ingredients, vitamins, minerals, etc., if such information is provided or known  The number of doses taken per day and time of day doses are taken  If medications are taken on an intermittent or as needed basis, please  "estimate how many days per week or days per month the medications are used  DO NOT just print out your medication list from [employer] or bring a list from a prior appointment or hospitalizations because the information is often often unreliable, inaccurate, outdated, and/or incomplete   The list should be printed or written  If you forget or do not have a list of all the medication, then it is acceptable, although less preferred, to bring all the bottles to the appointment     ARRIVE EARLY FOR ALL VISITS:  Please note that we are unable to accommodate late arrivals as per office policy.  YOU-the patient - (NOT a parent, spouse, or friend) must be physically present at check-in no later than 12 minutes after the scheduled appointment time, or you will be asked to reschedule   Consider scheduling a virtual appointment with Dr. Franklin through [employer] as an alternative if transportation to the clinic is difficult or unavailable   Please note, however, that virtual visits can only be scheduled after being an established patient of Dr. Franklin. All new appointments must be done in-person in clinic  Some insurances will not cover the cost of virtual appointments. Please check with your insurance to find out if these visits are covered    COMMUNICATING URGENT AND NON-URGENT MATTERS:  Your concerns are important and deserve to be heard and addressed. If you have an urgent matter, there are two methods that will ensure your concerns are prioritized appropriately:   Preferred method: Sign-up/Login to your [employer] account and send a message addressed to Dr. Franklin or Margret David (Dr. Franklin's assistant). In the subject line, type \"urgent\" followed by a word or phrase describing the situation (For example, write \"Urgent: Out of antiseuzre med and need refill\" or \"Urgent: Severe side effects to new meds\". In doing this, our staff can ensure urgent messages are triaged appropriately and communicated to Dr. Franklin that day.  Call " [facility] main line at [phone]. Dr. Franklin's voicemail extension is [phone]. When leaving a voice message, specifically indicate if it is urgent (or non-urgent) so that the matter can be triaged appropriately and addressed in a timely manner    Thank you for taking important action in your care!

## 2023-08-09 PROCEDURE — RXMED WILLOW AMBULATORY MEDICATION CHARGE: Performed by: STUDENT IN AN ORGANIZED HEALTH CARE EDUCATION/TRAINING PROGRAM

## 2023-08-10 ENCOUNTER — PHARMACY VISIT (OUTPATIENT)
Dept: PHARMACY | Facility: MEDICAL CENTER | Age: 63
End: 2023-08-10
Payer: COMMERCIAL

## 2023-08-10 PROCEDURE — RXMED WILLOW AMBULATORY MEDICATION CHARGE: Performed by: STUDENT IN AN ORGANIZED HEALTH CARE EDUCATION/TRAINING PROGRAM

## 2023-08-14 ENCOUNTER — TELEPHONE (OUTPATIENT)
Dept: PHARMACY | Facility: MEDICAL CENTER | Age: 63
End: 2023-08-14
Payer: MEDICARE

## 2023-08-14 NOTE — TELEPHONE ENCOUNTER
Contact:      Phone number: [phone], Mobile    Name of person spoken with and relationship to patient: Jovita Block, spouse   Patient’s Adherence:      How patient is doing on medication: Good    How many missed doses and reason: 0    Any new medications: No    Any new conditions: No    Any new allergies: No    Any new side effects: No    Any new diagnoses: No    How many doses remaining: about 25 tabs Aptiom 200mg tabs; 39 tabs of Xcopri 150mg tabs, some 100mg & some 200mg    Did patient want to speak with pharmacist: No  Delivery:      Delivery date and method: 08/15 via     Needs by Date: 08/22    Signature required: Yes    Any additional details for : N/A   Teach Appointment Date: N/A  Shipping Address: [address] [place] [address]  Medication(name, strength and dose): Xcopri 200mg tabs-2 tabs qd  Copay: $20  Payment Method: Pay at register   Supplies: None  Additional Information: Informed Jovita that the Aptiom has been reprocessed through the copay card with the new copay as $10. She has been refunded $75 as the original charge was $85. She reports that Fredy has decreased his dose of Aptiom (tapering off) and is increasing the doses of Lacosamide & Xcopri. She confirmed picking up the Lacosamide & a partial of Aptiom on 08/10. She is aware the remainder of the Aptiom is available for  and agreed to pick it up with the Xcopri next week on 08/15. Next call date for Aptiom: 09/06. Next call date for Xcopri: 09/14.

## 2023-08-15 ENCOUNTER — PHARMACY VISIT (OUTPATIENT)
Dept: PHARMACY | Facility: MEDICAL CENTER | Age: 63
End: 2023-08-15
Payer: COMMERCIAL

## 2023-08-21 PROCEDURE — RXMED WILLOW AMBULATORY MEDICATION CHARGE: Performed by: NURSE PRACTITIONER

## 2023-08-24 ENCOUNTER — PHARMACY VISIT (OUTPATIENT)
Dept: PHARMACY | Facility: MEDICAL CENTER | Age: 63
End: 2023-08-24
Payer: COMMERCIAL

## 2023-08-24 ENCOUNTER — OFFICE VISIT (OUTPATIENT)
Dept: NEUROLOGY | Facility: MEDICAL CENTER | Age: 63
End: 2023-08-24
Attending: STUDENT IN AN ORGANIZED HEALTH CARE EDUCATION/TRAINING PROGRAM
Payer: MEDICARE

## 2023-08-24 VITALS
HEART RATE: 66 BPM | DIASTOLIC BLOOD PRESSURE: 66 MMHG | WEIGHT: 166.67 LBS | OXYGEN SATURATION: 98 % | TEMPERATURE: 97.7 F | HEIGHT: 68 IN | SYSTOLIC BLOOD PRESSURE: 108 MMHG | BODY MASS INDEX: 25.26 KG/M2

## 2023-08-24 DIAGNOSIS — K13.79 HYPERTROPHY OF UVULA: ICD-10-CM

## 2023-08-24 DIAGNOSIS — R79.89 OTHER SPECIFIED ABNORMAL FINDINGS OF BLOOD CHEMISTRY: ICD-10-CM

## 2023-08-24 DIAGNOSIS — G47.19 EXCESSIVE DAYTIME SLEEPINESS: ICD-10-CM

## 2023-08-24 DIAGNOSIS — G40.219 INTRACTABLE FOCAL EPILEPSY WITH IMPAIRMENT OF CONSCIOUSNESS (HCC): ICD-10-CM

## 2023-08-24 DIAGNOSIS — R56.9 SEIZURES (HCC): ICD-10-CM

## 2023-08-24 DIAGNOSIS — R73.01 ELEVATED FASTING GLUCOSE: ICD-10-CM

## 2023-08-24 DIAGNOSIS — E55.9 VITAMIN D DEFICIENCY: ICD-10-CM

## 2023-08-24 DIAGNOSIS — G25.2 KINETIC TREMOR: ICD-10-CM

## 2023-08-24 DIAGNOSIS — G40.219 COMPLEX PARTIAL EPILEPSY WITH GENERALIZATION AND WITH INTRACTABLE EPILEPSY (HCC): ICD-10-CM

## 2023-08-24 DIAGNOSIS — G47.30 SLEEP APNEA, UNSPECIFIED TYPE: ICD-10-CM

## 2023-08-24 DIAGNOSIS — E78.5 DYSLIPIDEMIA: ICD-10-CM

## 2023-08-24 DIAGNOSIS — G25.2 CEREBELLAR TREMOR: ICD-10-CM

## 2023-08-24 DIAGNOSIS — G25.1 TREMOR DUE TO MULTIPLE DRUGS: ICD-10-CM

## 2023-08-24 DIAGNOSIS — J44.9 CHRONIC OBSTRUCTIVE PULMONARY DISEASE, UNSPECIFIED COPD TYPE (HCC): ICD-10-CM

## 2023-08-24 DIAGNOSIS — Z79.899 POLYPHARMACY: ICD-10-CM

## 2023-08-24 PROCEDURE — 99215 OFFICE O/P EST HI 40 MIN: CPT | Performed by: STUDENT IN AN ORGANIZED HEALTH CARE EDUCATION/TRAINING PROGRAM

## 2023-08-24 PROCEDURE — 3074F SYST BP LT 130 MM HG: CPT | Performed by: STUDENT IN AN ORGANIZED HEALTH CARE EDUCATION/TRAINING PROGRAM

## 2023-08-24 PROCEDURE — 3078F DIAST BP <80 MM HG: CPT | Performed by: STUDENT IN AN ORGANIZED HEALTH CARE EDUCATION/TRAINING PROGRAM

## 2023-08-24 PROCEDURE — 99212 OFFICE O/P EST SF 10 MIN: CPT | Performed by: STUDENT IN AN ORGANIZED HEALTH CARE EDUCATION/TRAINING PROGRAM

## 2023-08-24 RX ORDER — CHOLECALCIFEROL (VITAMIN D3) 50 MCG
2000 TABLET ORAL DAILY
COMMUNITY

## 2023-08-24 ASSESSMENT — FIBROSIS 4 INDEX: FIB4 SCORE: 0.83

## 2023-08-24 NOTE — PROGRESS NOTES
NEUROLOGY FOLLOW-UP - 08/24/2023     REASON FOR VISIT: Fredy Zavala 63 y.o. male presents today for follow-up.     SUMMARY PROBLEMS/MEDICAL CONDITIONS AND PRIOR MED HX:    INTERVAL HISTORY:  Patient with intractable focal epilepsy who returns with his wife for follow-up.  He is having breakthrough seizures with attempts to taper off his medications, one of which was a protracted focal to bilateral tonic-clonic seizure which has not happened in a long time.  He had another recent breakthrough seizure with lowering of clonazepam from 0.5 mg twice per day to 0.5 mg nightly.  While it was not convulsive it was still a a more prolonged seizure than his usual.  Therefore he went back up on his clonazepam and remains on 0.5 mg twice daily.  He just increased Xcopri from 350 to forbidden milligrams daily a couple days ago so this needs a couple weeks time to achieve a new steady state in his body.  He remains on higher dose of Vimpat that we made last time at 100 mg twice per day.  Also remains on Aptiom 400 mg nightly.  He has complaints of brain fog, tiredness, but no overt double vision or ataxia or concern for falls.      CURRENT MEDICATIONS AT THE TIME OF THIS ENCOUNTER:  Current Outpatient Medications on File Prior to Visit   Medication Sig Dispense Refill    Cholecalciferol (VITAMIN D) 2000 UNIT Tab Take 2,000 Units by mouth every day.      lacosamide (VIMPAT) 100 MG Tab tablet Take 1 tablet by mouth 2 times a day for 180 days. 60 Tablet 5    Cenobamate (XCOPRI) 200 MG Tab Take 2 tablets (400 mg) by mouth at bedtime 60 Tablet 2    eslicarbazepine (APTIOM) 200 MG Tab Take 2 tabs of 200 mg (400 mg total) once per day 60 Tablet 2    albuterol 108 (90 Base) MCG/ACT Aero Soln inhalation aerosol USE 2 INHALATIONS EVERY 6 HOURS AS NEEDED FOR SHORTNESS OF BREATH 8.5 g 10     No current facility-administered medications on file prior to visit.        EXAM:   Encounter Vitals  Standard Vitals  Vitals  Blood Pressure:  "108/66  Temperature: 36.5 °C (97.7 °F)  Temp src: Temporal  Pulse: 66  Pulse Oximetry: 98 %  Height: 172.7 cm (5' 8\")  Weight: 75.6 kg (166 lb 10.7 oz)  Encounter Vitals  Temperature: 36.5 °C (97.7 °F)  Temp src: Temporal  Blood Pressure: 108/66  Pulse: 66  Pulse Oximetry: 98 %  Weight: 75.6 kg (166 lb 10.7 oz)  Height: 172.7 cm (5' 8\")  BMI (Calculated): 25.34  Pulmonary-Specific Vitals     Durable Medical Equipment-Specific Vitals                  Physical Exam:  Physical Exam  Constitutional:       General: He is not in acute distress.  HENT:      Head: Normocephalic.      Nose: Nose normal.      Mouth/Throat:      Pharynx: Uvula swelling present.      Comments: Enlarged tongue and marked enlarged and long uvula with minor swelling  Cardiovascular:      Rate and Rhythm: Normal rate.   Neurological:      Coordination: Romberg sign negative.      Deep Tendon Reflexes:      Reflex Scores:       Tricep reflexes are 2+ on the right side and 2+ on the left side.       Bicep reflexes are 2+ on the right side and 2+ on the left side.       Brachioradialis reflexes are 2+ on the right side and 2+ on the left side.       Patellar reflexes are 3+ on the right side and 3+ on the left side.       Achilles reflexes are 2+ on the right side and 2+ on the left side.  Psychiatric:         Speech: Speech normal.        Neurological Exam   Neurological Exam  Mental Status   Oriented only to person, place, time and situation. Recalls 2 of 3 objects immediately. At 3 minutes recalls 2 of 3 objects. Recalls 1 of 3 objects with prompting. Speech is normal. Able to name objects. Follows complex commands. Digit span was 5 forward Fund of knowledge is appropriate for level of education.  Able to name months of years forward and backwards, complex sentence reprtiytion intact, abstract thinking and understanding intact. Speech and thinking is slower and more hesitant than usual.    Motor   No pronator drift.    Sensory  Light touch is " normal in upper and lower extremities.     Reflexes                                            Right                      Left  Brachioradialis                    2+                         2+  Biceps                                 2+                         2+  Triceps                                2+                         2+  Patellar                                3+                         3+  Achilles                                2+                         2+    Coordination  Right: Finger-to-nose abnormality: Rapid alternating movement abnormality:Left: Finger-to-nose abnormality: Rapid alternating movement abnormality:  Mild dysmetria on finger-nose-finger.    Gait  Casual gait is normal including stance, stride, and arm swing.Normal toe walking. Normal heel walking. Tandem gait abnormality: Romberg is absent.       PERTINENT NOTES AND DIAGNOSTICS DATA PERSONALLY REVIEW:      ASSESSMENT, EDUCATION, AND COUNSELING:  This is a 63 y.o. male patient who presents to the neurology clinic. We had an extensive discussion about the patient's symptoms, signs, and work-up to date, if any. We discussed potential and/or definitive diagnoses, work-up, and potential treatments.       PLAN:  If applicable, the work-up such as labs, imaging, procedures, and/or other testing, referrals, and/or recommended treatment strategies are listed below.    Visit Diagnoses     ICD-10-CM   1. Chronic obstructive pulmonary disease, unspecified COPD type ([facility])  J44.9   2. 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 Excessive daytime sleepiness  G47.19   6. Seizures ([facility])  R56.9   7. 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 Intractable focal epilepsy with impairment of consciousness ([facility])  G40.219   10. Complex partial epilepsy with generalization and with intractable epilepsy ([facility])  G40.219   11. Elevated fasting glucose  R73.01   12. Dyslipidemia  E78.5    13. Sleep apnea, unspecified type  G47.30   14. Other specified abnormal findings of blood chemistry  R79.89   15. Polypharmacy  Z79.899        Orders Placed This Encounter    CBC WITH DIFFERENTIAL    Comp Metabolic Panel    TSH+FREE T4    OXCARBAZEPINE    LACOSAMIDE    MAGNESIUM    Lipid Profile    HEMOGLOBIN A1C    Referral to Neurodiagnostics (EEG,EP,EMG/NCS/DBS)    Cholecalciferol (VITAMIN D) 2000 UNIT Tab      Patient returns for follow-up for intractable focal epilepsy.  The past couple of months we have been transitioning him onto Xcopri while simultaneously taking him off of his other antiseizure medications that are causing excessive side effects of sedation, incoordination, brain fog, mood disturbances.  The past 2 months or so have proved to be increasingly more difficult and unsafe since he had a recent prolonged focal to bilateral tonic-clonic seizure with a very protracted postictal recovery, something that he has not had in a very long time.  We all expressed concern about making any further adjustments to his medications for fear that he should have another one of these breakthrough convulsive seizures which places him at serious risk for harm and even death.  He currently is on Xcopri 400 mg nightly which she just started a couple days ago.  He is also on Vimpat 100 mg twice per day.  He remains on clonazepam 0.5 twice per day and Aptiom 400 mg nightly.  Attempts to wean off the latter 3 medications even individually and slowly have resulted in breakthrough seizures.    For this reason we agreed that he should be admitted to the EMU to provide an environment while equipped to make these adjustments more safely and done in a way that we will mitigate the risk of serious harm or death in the event that he does have convulsive seizure or status epilepticus.  Therefore the purpose of this admission is supervised medication withdrawal, medication optimization and titration as he is over medicated and  outpatient strategies to adjust medications have resulted in severe breakthrough seizures, focal to bilateral tonic clonic seizures, placing him at high risk for injury and even death, warranting inpatient admission to the EMU for careful supervised withdrawal and adjustment to meds.    We will get some updated labs including drug levels of Aptiom and Vimpat as detailed above.  We will also get vitamin D level.  Also get basic blood work and comprehensive metabolic panel.  He has been instructed to make no changes to his antiseizure medications for now.  Any further changes will be done in the epilepsy monitoring unit where he can be closely monitored, supervised to ensure his safety.           BILLING DOCUMENTATION:     I spent a total of I spent a total of 50 minutes on the day of the visit.   of face-to-face time in this visit. Over 50% of the time of the visit today was spent on counseling and/or coordination of care wtih the patient and/or family, as above in assessment in plan.    Fredy Franklin MD  Department of Neurology at [facility]  Diplomate of the American Board of Psychiatry and Neurology, General Neurology  Diplomate of American Board of Psychiatry and Neurology, a Member Board of the American Board of Medical Subspecialties, Epilepsy  Director of [facility]'s Level III Comprehensive Epilepsy Program  Professor of Clinical Neurology, [facility].   [address], [address]  [facility] [id number] [phone]   Fax: [phone]  E-mail: [email or web address]

## 2023-08-24 NOTE — PATIENT INSTRUCTIONS
NEUROLOGY CLINIC VISIT WITH DR. FRANKLIN       PATIENT EXPECTATIONS AND IMPORTANT APPOINTMENT REMINDERS:   You matter to Dr. Franklin and you deserve the best care.   Dr. Franklin prides himself on providing the best possible care to all his patients. He strives to make each appointment meaningful, so that all your concerns are being addressed and all your neurological problems are being optimally treated. In order to achieve these goals for everyone, Dr. Franklin has listed important reminders and the best ways to prepare for each appointment. Please read each item carefully. Thank you!    REFILLS:   Request refills AT LEAST 1 week in advance to ensure you do not run out of medications    [employer]  It is STRONGLY encouraged that ALL patients sign up for [employer]. It is BY FAR the fastest and most convenient way for both Dr. Franklin and patients to obtain timely refills.  If you are having trouble signing up or logging into your account, staff are available to help you. Please ask a medical assistant or staff at the  to assist you.    TEST RESULS:   All labs and diagnostic test results will be reviewed at your next visit, UNLESS  Dr. Franklin determines that there are important findings on the tests need to be acted on sooner. Dr. Franklin will either call or send a message through [employer] if this is the case.    BE PREPARED PRIOR TO EVERY APPOINTMENT:  All patient are responsible for ensuring that ALL test results that were completed outside of the [employer] system have been received by our Neurology Department PRIOR to your appointment with Dr. Franklin.    IMPORTANT:  ALL images (not just the reports) must be sent and uploaded to the [employer] system. Dr. Franklin reviews all images personally prior to each visit. Ensuring that ALL the test results and test images are accessible to Dr. Franklin prior to your appointment is YOUR responsibility and an important part of making the most out of each appointment.   Bring a  [facility]-issues picture ID and an updated insurance card EVERY visit.  It is highly recommended that you bring at every visit a list of the most important topics that you want address. While it may not be possible to address all items on the list in a single visit, preparing a list will ensure that Dr. Franklin addresses the items that are most important to you and your health    PAPERWORK, DOCUMENTATION, LETTER REQUESTS:  You must notify the office ahead of your appointment of all paperwork or letter requests.   Please DO NOT wait until the last minute to make these requests. Please give all paperwork to the medical assistant at the start of the appointment and check-in process. Please note that Dr. Franklin may not be able complete some types of documentation in a single appointment or even within a single day or week. This is why it is important to communicate paperwork requests prior to your appointment and at least 2 weeks prior to any deadlines.    KNOW ALL YOU MEDICATIONS:   AT EVERY SINGLE APPOINTMENT, please bring a list of every single prescribed, non-prescribed, and over the counter medication or supplements you are taking, including ones taken on a rare or intermittent basis.  Include the following information for each prescribed or non-prescribed medications:  Name of medication   The strength of EACH pill/capsule/tablet, etc.   The number of pills/capsules/tablets, etc taken per dose  The number and time of day that doses are taken  For every single Supplement that you take on a routine or intermittent basis, you must include:  The Brand Name   A complete list of every single ingredient, compound, vitamin, and/or mineral in each dose, along with the corresponding amounts/strengths of all ingredients, vitamins, minerals, etc., if such information is provided or known  The number of doses taken per day and time of day doses are taken  If medications are taken on an intermittent or as needed basis, please  "estimate how many days per week or days per month the medications are used  DO NOT just print out your medication list from [email or web address] or bring a list from a prior appointment or hospitalizations because the information is often often unreliable, inaccurate, outdated, and/or incomplete   The list should be printed or written  If you forget or do not have a list of all the medication, then it is acceptable, although less preferred, to bring all the bottles to the appointment     ARRIVE EARLY FOR ALL VISITS:  Please note that we are unable to accommodate late arrivals as per office policy.  YOU-the patient - (NOT a parent, spouse, or friend) must be physically present at check-in no later than 12 minutes after the scheduled appointment time, or you will be asked to reschedule   Consider scheduling a virtual appointment with Dr. Franklin through [email or web address] as an alternative if transportation to the clinic is difficult or unavailable   Please note, however, that virtual visits can only be scheduled after being an established patient of Dr. Franklin. All new appointments must be done in-person in clinic  Some insurances will not cover the cost of virtual appointments. Please check with your insurance to find out if these visits are covered    COMMUNICATING URGENT AND NON-URGENT MATTERS:  Your concerns are important and deserve to be heard and addressed. If you have an urgent matter, there are two methods that will ensure your concerns are prioritized appropriately:   Preferred method: Sign-up/Login to your [email or web address] account and send a message addressed to Dr. Franklin or Margret David (Dr. Franklin's assistant). In the subject line, type \"urgent\" followed by a word or phrase describing the situation (For example, write \"Urgent: Out of antiseuzre med and need refill\" or \"Urgent: Severe side effects to new meds\". In doing this, our staff can ensure urgent messages are triaged appropriately and communicated to Dr. Franklin that day.  Call " [facility] main line at [phone]. Dr. Franklin's voicemail extension is [phone]. When leaving a voice message, specifically indicate if it is urgent (or non-urgent) so that the matter can be triaged appropriately and addressed in a timely manner    Thank you for taking important action in your care!

## 2023-09-06 ENCOUNTER — TELEPHONE (OUTPATIENT)
Dept: PHARMACY | Facility: MEDICAL CENTER | Age: 63
End: 2023-09-06
Payer: MEDICARE

## 2023-09-06 PROCEDURE — RXMED WILLOW AMBULATORY MEDICATION CHARGE: Performed by: STUDENT IN AN ORGANIZED HEALTH CARE EDUCATION/TRAINING PROGRAM

## 2023-09-07 NOTE — TELEPHONE ENCOUNTER
Contact:      Phone number: [phone], Mobile    Name of person spoken with and relationship to patient: Jovita, Spouse   Patient’s Adherence:      How patient is doing on medication: Good    How many missed doses and reason: 0    Any new medications: No    Any new conditions: No    Any new allergies: No    Any new side effects: No    Any new diagnoses: No    How many doses remaininds    Did patient want to speak with pharmacist: No  Delivery:      Delivery date and method:  via     Needs by Date:     Signature required: Yes    Any additional details for : Morning delivery   Teach Appointment Date: N/A  Shipping Address: [address] [place] [address]  Medication (name, strength and dose): Aptiom 200mg tabs; Lacosamide 100mg tabs; Xcopri 200mg tabs  Copay: $0  Payment Method: [employer]   Supplies: None  Additional Information: Next call date: 10/02.

## 2023-09-08 ENCOUNTER — PHARMACY VISIT (OUTPATIENT)
Dept: PHARMACY | Facility: MEDICAL CENTER | Age: 63
End: 2023-09-08
Payer: COMMERCIAL

## 2023-09-11 ENCOUNTER — PATIENT MESSAGE (OUTPATIENT)
Dept: MEDICAL GROUP | Facility: PHYSICIAN GROUP | Age: 63
End: 2023-09-11
Payer: MEDICARE

## 2023-09-11 DIAGNOSIS — E78.5 DYSLIPIDEMIA: ICD-10-CM

## 2023-09-11 DIAGNOSIS — E55.9 VITAMIN D DEFICIENCY: ICD-10-CM

## 2023-09-11 NOTE — PROGRESS NOTES
"Subjective:      Fredy Zavala is a 57 y.o. male who presents with his wife Jovita, for follow-up, with a history of persistent complex partial seizures.     HPI    He continues with a familiar pattern of seizures. They still can happen on a daily basis, he will often have them in clusters, multiple times within a day. Jovita notes that little ones can occur where he would suddenly space out with automatisms, this time lasting for matter of 30 seconds and then with more rapid recovery. He still denies any type of warning or aura, he is unaware of his environment during the events themselves. The more sustained complex partial events simply are longer, and the posterior dome is longer with some malaise and fatigue. There have never been any tonic-clonic seizures or generalized events.    He is compliant with his lamotrigine and Carbatrol regimen. There is no diurnal pattern to the onset of these events. They do not typically occur while he is asleep. He still has the significant dizziness with some occasional unsteadiness while walking and tremulousness in both upper extremities from his drugs. Routine video EEG was done back in February, this was completely normal. Unfortunately, he had no clinical events during the tracing. I discussed the case with our epilepsy specialist, Dr. Negron.    Medical, surgical and family histories are reviewed, there are no new drug allergies. He is on Carbatrol 300 mg, twice a day, lamotrigine 300 mg, twice a day, Pravachol, and his 2 inhalers.    Review of Systems   Neurological: Negative for seizures and loss of consciousness.   Psychiatric/Behavioral: Negative for depression and memory loss.   All other systems reviewed and are negative.       Objective:     /80   Pulse 76   Temp 36.4 °C (97.5 °F)   Resp 15   Ht 1.727 m (5' 8\")   Wt 85.5 kg (188 lb 9.6 oz)   SpO2 96%   BMI 28.68 kg/m²      Physical Exam    He appears no acute distress. Vital signs are stable. There is " no malar rash. The neck is supple. Cardiac evaluation is unremarkable. He is fully oriented, there is no aphasia. PERRLA/EOMI, visual fields are full, there is no facial asymmetry. A coarse tremor seen with both upper extremities, increased with action, but still present at rest. Tone is normal. There is no focal weakness. There is no appendicular dystaxia. He stands easily, there is no obvious postural instability.     Assessment/Plan:     1. Partial epilepsy with impairment of consciousness ([facility])  Inpatient monitoring I think is really the next step for this gentleman. Interestingly, she still seems to be very reluctant to move forward when it comes to diagnoses and changing treatment. He almost seems sort of disinterested and indifferent, despite the side effects and the fact that the seizures are interrupting his life significantly. He has gotten so used to the difficulties that it does not seem to be worthwhile. I am also wondering if we are seeing both epileptic and nonepileptic events. He certainly has failed a long list of other medications. He was told that quantifying and qualifying the seizures can be helpful, he may be a candidate for VNS. Imaging has suggested the presence of some type of focal temporal lobe lesion, and if his seizures localized to one area, even surgery could be considered. Still, there are other medications that can be used, but we need more data. Monitoring will be scheduled, will follow up afterwards. His present medication regimen will be continued for now.    - [id number]  - lamotrigine (LAMICTAL) 200 MG tablet; TAKE 1 TABLET BY MOUTH TWICE DAILY-GENERIC FOR LAMICTAL  Dispense: 60 Tab; Refill: 11  - lamotrigine (LAMICTAL) 100 MG Tab; Take 1 Tab by mouth 2 times a day. Take with 200 mg to equal 300 mg  Dispense: 60 Tab; Refill: 11  - carbamazepine (CARBATROL) 300 MG CR capsule; Take 1 Cap by mouth 2 times a day.  Dispense: 60 Cap; Refill: 11    Time: Evaluation 25 minutes  for exam, review, discussion, and education  Discussion: As mentioned in the assessment, over 60% of the time spent face-to-face counseling and coordinating care   Home

## 2023-09-15 ENCOUNTER — HOSPITAL ENCOUNTER (OUTPATIENT)
Dept: LAB | Facility: MEDICAL CENTER | Age: 63
End: 2023-09-15
Attending: NURSE PRACTITIONER
Payer: MEDICARE

## 2023-09-15 ENCOUNTER — HOSPITAL ENCOUNTER (OUTPATIENT)
Dept: LAB | Facility: MEDICAL CENTER | Age: 63
End: 2023-09-15
Attending: STUDENT IN AN ORGANIZED HEALTH CARE EDUCATION/TRAINING PROGRAM
Payer: MEDICARE

## 2023-09-15 DIAGNOSIS — G47.19 EXCESSIVE DAYTIME SLEEPINESS: ICD-10-CM

## 2023-09-15 DIAGNOSIS — E78.5 DYSLIPIDEMIA: ICD-10-CM

## 2023-09-15 DIAGNOSIS — G40.219 COMPLEX PARTIAL EPILEPSY WITH GENERALIZATION AND WITH INTRACTABLE EPILEPSY (HCC): ICD-10-CM

## 2023-09-15 DIAGNOSIS — R79.89 OTHER SPECIFIED ABNORMAL FINDINGS OF BLOOD CHEMISTRY: ICD-10-CM

## 2023-09-15 DIAGNOSIS — E55.9 VITAMIN D DEFICIENCY: ICD-10-CM

## 2023-09-15 DIAGNOSIS — R73.01 ELEVATED FASTING GLUCOSE: ICD-10-CM

## 2023-09-15 DIAGNOSIS — G40.219 INTRACTABLE FOCAL EPILEPSY WITH IMPAIRMENT OF CONSCIOUSNESS (HCC): ICD-10-CM

## 2023-09-15 LAB
25(OH)D3 SERPL-MCNC: 45 NG/ML (ref 30–100)
ALBUMIN SERPL BCP-MCNC: 4.6 G/DL (ref 3.2–4.9)
ALBUMIN/GLOB SERPL: 1.8 G/DL
ALP SERPL-CCNC: 105 U/L (ref 30–99)
ALT SERPL-CCNC: 22 U/L (ref 2–50)
ANION GAP SERPL CALC-SCNC: 12 MMOL/L (ref 7–16)
AST SERPL-CCNC: 21 U/L (ref 12–45)
BASOPHILS # BLD AUTO: 1 % (ref 0–1.8)
BASOPHILS # BLD: 0.04 K/UL (ref 0–0.12)
BILIRUB SERPL-MCNC: 0.3 MG/DL (ref 0.1–1.5)
BUN SERPL-MCNC: 15 MG/DL (ref 8–22)
CALCIUM ALBUM COR SERPL-MCNC: 8.7 MG/DL (ref 8.5–10.5)
CALCIUM SERPL-MCNC: 9.2 MG/DL (ref 8.5–10.5)
CHLORIDE SERPL-SCNC: 105 MMOL/L (ref 96–112)
CHOLEST SERPL-MCNC: 196 MG/DL (ref 100–199)
CO2 SERPL-SCNC: 23 MMOL/L (ref 20–33)
CREAT SERPL-MCNC: 0.75 MG/DL (ref 0.5–1.4)
EOSINOPHIL # BLD AUTO: 0.23 K/UL (ref 0–0.51)
EOSINOPHIL NFR BLD: 5.9 % (ref 0–6.9)
ERYTHROCYTE [DISTWIDTH] IN BLOOD BY AUTOMATED COUNT: 49.9 FL (ref 35.9–50)
EST. AVERAGE GLUCOSE BLD GHB EST-MCNC: 103 MG/DL
GFR SERPLBLD CREATININE-BSD FMLA CKD-EPI: 101 ML/MIN/1.73 M 2
GLOBULIN SER CALC-MCNC: 2.5 G/DL (ref 1.9–3.5)
GLUCOSE SERPL-MCNC: 96 MG/DL (ref 65–99)
HBA1C MFR BLD: 5.2 % (ref 4–5.6)
HCT VFR BLD AUTO: 43.8 % (ref 42–52)
HDLC SERPL-MCNC: 41 MG/DL
HGB BLD-MCNC: 14.4 G/DL (ref 14–18)
IMM GRANULOCYTES # BLD AUTO: 0 K/UL (ref 0–0.11)
IMM GRANULOCYTES NFR BLD AUTO: 0 % (ref 0–0.9)
LDLC SERPL CALC-MCNC: 134 MG/DL
LYMPHOCYTES # BLD AUTO: 1.37 K/UL (ref 1–4.8)
LYMPHOCYTES NFR BLD: 35 % (ref 22–41)
MAGNESIUM SERPL-MCNC: 2.1 MG/DL (ref 1.5–2.5)
MCH RBC QN AUTO: 31.4 PG (ref 27–33)
MCHC RBC AUTO-ENTMCNC: 32.9 G/DL (ref 32.3–36.5)
MCV RBC AUTO: 95.4 FL (ref 81.4–97.8)
MONOCYTES # BLD AUTO: 0.48 K/UL (ref 0–0.85)
MONOCYTES NFR BLD AUTO: 12.3 % (ref 0–13.4)
NEUTROPHILS # BLD AUTO: 1.79 K/UL (ref 1.82–7.42)
NEUTROPHILS NFR BLD: 45.8 % (ref 44–72)
NRBC # BLD AUTO: 0 K/UL
NRBC BLD-RTO: 0 /100 WBC (ref 0–0.2)
PLATELET # BLD AUTO: 250 K/UL (ref 164–446)
PMV BLD AUTO: 10.3 FL (ref 9–12.9)
POTASSIUM SERPL-SCNC: 4.4 MMOL/L (ref 3.6–5.5)
PROT SERPL-MCNC: 7.1 G/DL (ref 6–8.2)
RBC # BLD AUTO: 4.59 M/UL (ref 4.7–6.1)
SODIUM SERPL-SCNC: 140 MMOL/L (ref 135–145)
T4 FREE SERPL-MCNC: 1.08 NG/DL (ref 0.93–1.7)
TRIGL SERPL-MCNC: 105 MG/DL (ref 0–149)
TSH SERPL DL<=0.005 MIU/L-ACNC: 1.98 UIU/ML (ref 0.38–5.33)
WBC # BLD AUTO: 3.9 K/UL (ref 4.8–10.8)

## 2023-09-15 PROCEDURE — 83735 ASSAY OF MAGNESIUM: CPT

## 2023-09-15 PROCEDURE — 83036 HEMOGLOBIN GLYCOSYLATED A1C: CPT | Mod: GA

## 2023-09-15 PROCEDURE — 36415 COLL VENOUS BLD VENIPUNCTURE: CPT

## 2023-09-15 PROCEDURE — 84439 ASSAY OF FREE THYROXINE: CPT

## 2023-09-15 PROCEDURE — 84443 ASSAY THYROID STIM HORMONE: CPT

## 2023-09-15 PROCEDURE — 85025 COMPLETE CBC W/AUTO DIFF WBC: CPT

## 2023-09-15 PROCEDURE — 82306 VITAMIN D 25 HYDROXY: CPT

## 2023-09-15 PROCEDURE — 80053 COMPREHEN METABOLIC PANEL: CPT

## 2023-09-15 PROCEDURE — 80235 DRUG ASSAY LACOSAMIDE: CPT

## 2023-09-15 PROCEDURE — 80183 DRUG SCRN QUANT OXCARBAZEPIN: CPT

## 2023-09-15 PROCEDURE — 80061 LIPID PANEL: CPT

## 2023-09-18 LAB — LACOSAMIDE SERPL-MCNC: 4.8 UG/ML (ref 1–10)

## 2023-09-18 PROCEDURE — RXMED WILLOW AMBULATORY MEDICATION CHARGE: Performed by: NURSE PRACTITIONER

## 2023-09-20 LAB — 10OH-CARBAZEPINE SERPL-MCNC: 4 UG/ML (ref 3–35)

## 2023-09-21 ENCOUNTER — PHARMACY VISIT (OUTPATIENT)
Dept: PHARMACY | Facility: MEDICAL CENTER | Age: 63
End: 2023-09-21
Payer: COMMERCIAL

## 2023-09-27 ENCOUNTER — OFFICE VISIT (OUTPATIENT)
Dept: MEDICAL GROUP | Facility: PHYSICIAN GROUP | Age: 63
End: 2023-09-27
Payer: MEDICARE

## 2023-09-27 VITALS
RESPIRATION RATE: 16 BRPM | SYSTOLIC BLOOD PRESSURE: 122 MMHG | WEIGHT: 165 LBS | TEMPERATURE: 97.1 F | DIASTOLIC BLOOD PRESSURE: 60 MMHG | HEART RATE: 58 BPM | BODY MASS INDEX: 25.01 KG/M2 | HEIGHT: 68 IN | OXYGEN SATURATION: 98 %

## 2023-09-27 DIAGNOSIS — L57.0 ACTINIC KERATOSIS: ICD-10-CM

## 2023-09-27 DIAGNOSIS — Z23 NEED FOR VACCINATION: ICD-10-CM

## 2023-09-27 DIAGNOSIS — E55.9 VITAMIN D DEFICIENCY: ICD-10-CM

## 2023-09-27 DIAGNOSIS — J44.9 CHRONIC OBSTRUCTIVE PULMONARY DISEASE, UNSPECIFIED COPD TYPE (HCC): ICD-10-CM

## 2023-09-27 DIAGNOSIS — D22.9 MULTIPLE ATYPICAL SKIN MOLES: ICD-10-CM

## 2023-09-27 DIAGNOSIS — E78.5 DYSLIPIDEMIA: ICD-10-CM

## 2023-09-27 PROCEDURE — 99214 OFFICE O/P EST MOD 30 MIN: CPT | Mod: 25 | Performed by: NURSE PRACTITIONER

## 2023-09-27 PROCEDURE — 3078F DIAST BP <80 MM HG: CPT | Performed by: NURSE PRACTITIONER

## 2023-09-27 PROCEDURE — 90686 IIV4 VACC NO PRSV 0.5 ML IM: CPT | Performed by: NURSE PRACTITIONER

## 2023-09-27 PROCEDURE — G0008 ADMIN INFLUENZA VIRUS VAC: HCPCS | Performed by: NURSE PRACTITIONER

## 2023-09-27 PROCEDURE — 3074F SYST BP LT 130 MM HG: CPT | Performed by: NURSE PRACTITIONER

## 2023-09-27 PROCEDURE — RXMED WILLOW AMBULATORY MEDICATION CHARGE: Performed by: NURSE PRACTITIONER

## 2023-09-27 RX ORDER — ATORVASTATIN CALCIUM 20 MG/1
20 TABLET, FILM COATED ORAL NIGHTLY
Qty: 90 TABLET | Refills: 3 | Status: SHIPPED | OUTPATIENT
Start: 2023-09-27 | End: 2023-12-07 | Stop reason: SDUPTHER

## 2023-09-27 RX ORDER — CLONAZEPAM 1 MG/1
TABLET ORAL 2 TIMES DAILY
Status: ON HOLD | COMMUNITY
End: 2023-10-06

## 2023-09-27 RX ORDER — FLUTICASONE FUROATE, UMECLIDINIUM BROMIDE AND VILANTEROL TRIFENATATE 200; 62.5; 25 UG/1; UG/1; UG/1
1 POWDER RESPIRATORY (INHALATION) DAILY
Qty: 60 EACH | Refills: 6 | Status: SHIPPED | OUTPATIENT
Start: 2023-09-27

## 2023-09-27 ASSESSMENT — FIBROSIS 4 INDEX: FIB4 SCORE: 1.15

## 2023-09-27 NOTE — ASSESSMENT & PLAN NOTE
Latest Reference Range & Units 06/02/23 06:46 09/15/23 06:33   25-Hydroxy   Vitamin D 25 30 - 100 ng/mL 47 45     - continue Vit D 2000 units  - monitor vit D level with annual labs

## 2023-09-27 NOTE — ASSESSMENT & PLAN NOTE
Never smoked cigarettes. Confirmed COPD with hyperinflation and pulmonary function test with pulmonologist.   He had allergy testing with [facility] in 2022 and he no longer needs to see them since he is stable on current inhaler.  - continue Daily Trelegy-200 at 1puff inhaler, prn albuterol

## 2023-09-27 NOTE — PROGRESS NOTES
Subjective       CC:   Chief Complaint   Patient presents with    Follow-Up    Lab Results     9/15/23        HPI:   Patient is a 63 y.o. established male patient with medical history listed below here today for evaluation and management of COPD, dyslipidemia. We will review his annual labs today. No new concerns.   He follows Dr [facility] for epilepsy management. No changes in his medications. Continues on Vimpat, Aptiom, Xcopri. They are trying to see if he can safely titrate off Vimpat & Aptiom.    New concern - moles on back      Problem   Actinic Keratosis   Vitamin D Deficiency    Taking Vit D 2000 units daily     Dyslipidemia    Taking Atorvastatin 20mg QHS (switched from Pravastatin in 7/2023 for better control)     Copd (Chronic Obstructive Pulmonary Disease) ([facility])    On Daily Trelegy-200 at 1puff inhaler, prn albuterol         Patient Active Problem List   Diagnosis    Dyslipidemia    COPD (chronic obstructive pulmonary disease) (HCC)    History of prostate cancer    Intractable focal epilepsy with impairment of consciousness (HCC)    Tremor due to multiple drugs    Vitamin D deficiency    Elevated fasting glucose    Sleep apnea    Hypertrophy of uvula    Kinetic tremor    Actinic keratosis       Past Medical History:   Diagnosis Date    ASTHMA     Cancer (HCC) 05/2012    prostate    COPD (chronic obstructive pulmonary disease) (HCC)     Dyslipidemia     Partial epilepsy with impairment of consciousness (HCC)      ICD-10 transition        Past Surgical History:   Procedure Laterality Date    PROSTATECTOMY ROBOTIC  5/29/2012    Performed by JACOB RAMOS at SURGERY [facility]    COLONOSCOPY  8/10    grade 1 int. hemorrhoids        Current Outpatient Medications on File Prior to Visit   Medication Sig Dispense Refill    clonazePAM (KLONOPIN) 1 MG Tab Take  by mouth 2 times a day.      Cholecalciferol (VITAMIN D) 2000 UNIT Tab Take 2,000 Units by mouth every day.      lacosamide  "(VIMPAT) 100 MG Tab tablet Take 1 tablet by mouth 2 times a day for 180 days. 60 Tablet 5    Cenobamate (XCOPRI) 200 MG Tab Take 2 tablets (400 mg) by mouth at bedtime 60 Tablet 2    eslicarbazepine (APTIOM) 200 MG Tab Take 2 tabs of 200 mg (400 mg total) once per day 60 Tablet 2    albuterol 108 (90 Base) MCG/ACT Aero Soln inhalation aerosol USE 2 INHALATIONS EVERY 6 HOURS AS NEEDED FOR SHORTNESS OF BREATH 8.5 g 10     No current facility-administered medications on file prior to visit.        Health Maintenance: Completed    ROS:  Review of Systems   Constitutional: Negative.  Negative for fever and malaise/fatigue.   HENT: Negative.     Eyes: Negative.    Respiratory:  Negative for cough, sputum production and shortness of breath.    Cardiovascular:  Negative for chest pain, palpitations and leg swelling.   Gastrointestinal: Negative.    Genitourinary: Negative.    Musculoskeletal: Negative.    Skin:         moles   Neurological: Negative.    Endo/Heme/Allergies: Negative.    Psychiatric/Behavioral: Negative.         Objective       Exam:  /60   Pulse (!) 58   Temp 36.2 °C (97.1 °F) (Temporal)   Resp 16   Ht 1.727 m (5' 8\")   Wt 74.8 kg (165 lb)   SpO2 98%   BMI 25.09 kg/m²  Body mass index is 25.09 kg/m².    Physical Exam  Constitutional:       Appearance: Normal appearance.   Cardiovascular:      Rate and Rhythm: Normal rate and regular rhythm.      Pulses: Normal pulses.      Heart sounds: Normal heart sounds.   Pulmonary:      Effort: Pulmonary effort is normal.      Breath sounds: Normal breath sounds.   Musculoskeletal:      Right lower leg: No edema.      Left lower leg: No edema.   Skin:     General: Skin is warm and dry.             Comments: AK lesions on uper back  Hyperpigmented moles on chest   Neurological:      General: No focal deficit present.      Mental Status: He is alert and oriented to person, place, and time.         Labs: reviewed with patient; question answered    Assessment " & Plan       63 y.o. male with the following -     Problem List Items Addressed This Visit       Dyslipidemia      Latest Reference Range & Units 06/02/23 06:46 09/15/23 06:27   Cholesterol,Tot 100 - 199 mg/dL 248 (H) 196   Triglycerides 0 - 149 mg/dL 89 105   HDL >=40 mg/dL 47 41   LDL <100 mg/dL 183 (H) 134 (H)   The 10-year ASCVD risk score (Galdino ROLLINS, et al., 2019) is: 12.1%    Chronic; LDL goal <100; we had increased pravastatin from 20 to 40mg in 3/2023, LDL remained elevated so we switched him to Atorvastatin in 7/2023; LDL has reduced to 134; will recheck again in 6 months and can titrate up if still needed to meet goal.  Tolerating statin, no myalgia; has hx epilepsy  - Continue  Atorvastatin 20mg QHS  - recheck fasting lipid in 6 months         Relevant Medications    atorvastatin (LIPITOR) 20 MG Tab    Other Relevant Orders    Lipid Profile    COPD (chronic obstructive pulmonary disease) ([facility])     Never smoked cigarettes. Confirmed COPD with hyperinflation and pulmonary function test with pulmonologist.   He had allergy testing with [facility] in 2022 and he no longer needs to see them since he is stable on current inhaler.  - continue Daily Trelegy-200 at 1puff inhaler, prn albuterol         Relevant Medications    fluticasone-umeclidinium-vilanterol (TRELEGY ELLIPTA) 200-62.5-25 mcg/act inhaler    Vitamin D deficiency      Latest Reference Range & Units 06/02/23 06:46 09/15/23 06:33   25-Hydroxy   Vitamin D 25 30 - 100 ng/mL 47 45   - continue Vit D 2000 units  - monitor vit D level with annual labs         Actinic keratosis     Several AK lesions noted on his back; he also has some hyperpigmented moles on his chest. Referral placed to derm for head-toe skin assessment         Relevant Orders    Referral to Dermatology     Other Visit Diagnoses       Need for vaccination        I have placed the below orders and discussed them with an approved delegating provider.The MA is performing the  below orders under the direction of Dr Grossman.    Relevant Orders    INFLUENZA VACCINE QUAD INJ (PF) (Completed)    Multiple atypical skin moles        Relevant Orders    Referral to Dermatology          Medications Prescribed Today:  2. Chronic obstructive pulmonary disease, unspecified COPD type (HCC)  - fluticasone-umeclidinium-vilanterol (TRELEGY ELLIPTA) 200-62.5-25 mcg/act inhaler; Inhale 1 puff every day.  Dispense: 60 Each; Refill: 6    4. Dyslipidemia  - atorvastatin (LIPITOR) 20 MG Tab; Take 1 Tablet by mouth every evening.  Dispense: 90 Tablet; Refill: 3    Educated in proper administration of medication(s) ordered today including safety, possible SE, risks, benefits, rationale and alternatives to therapy.     Return in about 6 months (around 3/27/2024).    Please note that this dictation was created using voice recognition software. I have made every reasonable attempt to correct obvious errors, but I expect that there are errors of grammar and possibly content that I did not discover before finalizing the note.

## 2023-09-28 PROBLEM — L57.0 ACTINIC KERATOSIS: Status: ACTIVE | Noted: 2023-09-28

## 2023-09-28 ASSESSMENT — ENCOUNTER SYMPTOMS
PALPITATIONS: 0
ROS SKIN COMMENTS: MOLES
SHORTNESS OF BREATH: 0
FEVER: 0
PSYCHIATRIC NEGATIVE: 1
MUSCULOSKELETAL NEGATIVE: 1
CONSTITUTIONAL NEGATIVE: 1
SPUTUM PRODUCTION: 0
GASTROINTESTINAL NEGATIVE: 1
EYES NEGATIVE: 1
COUGH: 0
NEUROLOGICAL NEGATIVE: 1

## 2023-09-28 NOTE — ASSESSMENT & PLAN NOTE
Latest Reference Range & Units 06/02/23 06:46 09/15/23 06:27   Cholesterol,Tot 100 - 199 mg/dL 248 (H) 196   Triglycerides 0 - 149 mg/dL 89 105   HDL >=40 mg/dL 47 41   LDL <100 mg/dL 183 (H) 134 (H)   The 10-year ASCVD risk score (Galdino ROLLINS, et al., 2019) is: 12.1%    Chronic; LDL goal <100; we had increased pravastatin from 20 to 40mg in 3/2023, LDL remained elevated so we switched him to Atorvastatin in 7/2023; LDL has reduced to 134; will recheck again in 6 months and can titrate up if still needed to meet goal.  Tolerating statin, no myalgia; has hx epilepsy  - Continue  Atorvastatin 20mg QHS  - recheck fasting lipid in 6 months

## 2023-09-28 NOTE — ASSESSMENT & PLAN NOTE
Several AK lesions noted on his back; he also has some hyperpigmented moles on his chest. Referral placed to derm for head-toe skin assessment

## 2023-10-01 DIAGNOSIS — Z29.9 PROPHYLACTIC MEASURE: ICD-10-CM

## 2023-10-01 DIAGNOSIS — R52 PAIN: ICD-10-CM

## 2023-10-01 DIAGNOSIS — R56.9 SEIZURES (HCC): ICD-10-CM

## 2023-10-01 DIAGNOSIS — G40.109 FOCAL EPILEPSY (HCC): ICD-10-CM

## 2023-10-01 DIAGNOSIS — R11.2 NAUSEA AND VOMITING, UNSPECIFIED VOMITING TYPE: ICD-10-CM

## 2023-10-01 RX ORDER — POLYETHYLENE GLYCOL 3350 17 G/17G
1 POWDER, FOR SOLUTION ORAL
Status: CANCELLED | OUTPATIENT
Start: 2023-10-01

## 2023-10-01 RX ORDER — AMOXICILLIN 250 MG
2 CAPSULE ORAL 2 TIMES DAILY
Status: CANCELLED | OUTPATIENT
Start: 2023-10-01

## 2023-10-01 RX ORDER — LORAZEPAM 2 MG/ML
2 INJECTION INTRAMUSCULAR
Status: CANCELLED | OUTPATIENT
Start: 2023-10-01

## 2023-10-01 RX ORDER — ONDANSETRON 4 MG/1
4 TABLET, ORALLY DISINTEGRATING ORAL EVERY 4 HOURS PRN
Status: CANCELLED | OUTPATIENT
Start: 2023-10-01

## 2023-10-01 RX ORDER — IBUPROFEN 600 MG/1
600 TABLET ORAL EVERY 6 HOURS PRN
Status: CANCELLED | OUTPATIENT
Start: 2023-10-01

## 2023-10-01 RX ORDER — LORAZEPAM 2 MG/ML
1 INJECTION INTRAMUSCULAR
Status: CANCELLED | OUTPATIENT
Start: 2023-10-01

## 2023-10-01 RX ORDER — BISACODYL 10 MG
10 SUPPOSITORY, RECTAL RECTAL
Status: CANCELLED | OUTPATIENT
Start: 2023-10-01

## 2023-10-01 RX ORDER — ENOXAPARIN SODIUM 100 MG/ML
40 INJECTION SUBCUTANEOUS DAILY
Status: CANCELLED | OUTPATIENT
Start: 2023-10-01

## 2023-10-01 RX ORDER — ONDANSETRON 2 MG/ML
4 INJECTION INTRAMUSCULAR; INTRAVENOUS EVERY 4 HOURS PRN
Status: CANCELLED | OUTPATIENT
Start: 2023-10-01

## 2023-10-02 ENCOUNTER — HOSPITAL ENCOUNTER (INPATIENT)
Facility: MEDICAL CENTER | Age: 63
LOS: 4 days | DRG: 101 | End: 2023-10-06
Attending: STUDENT IN AN ORGANIZED HEALTH CARE EDUCATION/TRAINING PROGRAM | Admitting: STUDENT IN AN ORGANIZED HEALTH CARE EDUCATION/TRAINING PROGRAM
Payer: MEDICARE

## 2023-10-02 DIAGNOSIS — M62.830 BACK MUSCLE SPASM: ICD-10-CM

## 2023-10-02 DIAGNOSIS — E55.9 VITAMIN D DEFICIENCY: ICD-10-CM

## 2023-10-02 DIAGNOSIS — Z29.9 PROPHYLACTIC MEASURE: ICD-10-CM

## 2023-10-02 DIAGNOSIS — G89.29 CHRONIC MIDLINE LOW BACK PAIN, UNSPECIFIED WHETHER SCIATICA PRESENT: ICD-10-CM

## 2023-10-02 DIAGNOSIS — M54.50 CHRONIC MIDLINE LOW BACK PAIN, UNSPECIFIED WHETHER SCIATICA PRESENT: ICD-10-CM

## 2023-10-02 DIAGNOSIS — R11.2 NAUSEA AND VOMITING, UNSPECIFIED VOMITING TYPE: ICD-10-CM

## 2023-10-02 DIAGNOSIS — R52 PAIN: ICD-10-CM

## 2023-10-02 DIAGNOSIS — G40.109 FOCAL EPILEPSY (HCC): ICD-10-CM

## 2023-10-02 DIAGNOSIS — E78.5 DYSLIPIDEMIA: ICD-10-CM

## 2023-10-02 DIAGNOSIS — R56.9 SEIZURES (HCC): ICD-10-CM

## 2023-10-02 DIAGNOSIS — J44.9 CHRONIC OBSTRUCTIVE PULMONARY DISEASE, UNSPECIFIED COPD TYPE (HCC): ICD-10-CM

## 2023-10-02 PROCEDURE — 95714 VEEG EA 12-26 HR UNMNTR: CPT | Performed by: STUDENT IN AN ORGANIZED HEALTH CARE EDUCATION/TRAINING PROGRAM

## 2023-10-02 PROCEDURE — A9270 NON-COVERED ITEM OR SERVICE: HCPCS | Performed by: STUDENT IN AN ORGANIZED HEALTH CARE EDUCATION/TRAINING PROGRAM

## 2023-10-02 PROCEDURE — 95700 EEG CONT REC W/VID EEG TECH: CPT | Performed by: STUDENT IN AN ORGANIZED HEALTH CARE EDUCATION/TRAINING PROGRAM

## 2023-10-02 PROCEDURE — 95720 EEG PHY/QHP EA INCR W/VEEG: CPT | Performed by: STUDENT IN AN ORGANIZED HEALTH CARE EDUCATION/TRAINING PROGRAM

## 2023-10-02 PROCEDURE — 700111 HCHG RX REV CODE 636 W/ 250 OVERRIDE (IP): Mod: JZ | Performed by: STUDENT IN AN ORGANIZED HEALTH CARE EDUCATION/TRAINING PROGRAM

## 2023-10-02 PROCEDURE — 770020 HCHG ROOM/CARE - TELE (206)

## 2023-10-02 PROCEDURE — 99223 1ST HOSP IP/OBS HIGH 75: CPT | Mod: 25 | Performed by: STUDENT IN AN ORGANIZED HEALTH CARE EDUCATION/TRAINING PROGRAM

## 2023-10-02 PROCEDURE — 700102 HCHG RX REV CODE 250 W/ 637 OVERRIDE(OP): Performed by: STUDENT IN AN ORGANIZED HEALTH CARE EDUCATION/TRAINING PROGRAM

## 2023-10-02 PROCEDURE — 4A00X4Z MEASUREMENT OF CENTRAL NERVOUS ELECTRICAL ACTIVITY, EXTERNAL APPROACH: ICD-10-PCS | Performed by: STUDENT IN AN ORGANIZED HEALTH CARE EDUCATION/TRAINING PROGRAM

## 2023-10-02 RX ORDER — LACOSAMIDE 100 MG/1
100 TABLET ORAL 2 TIMES DAILY
Status: DISCONTINUED | OUTPATIENT
Start: 2023-10-02 | End: 2023-10-02

## 2023-10-02 RX ORDER — LORAZEPAM 2 MG/ML
2 INJECTION INTRAMUSCULAR
Status: DISCONTINUED | OUTPATIENT
Start: 2023-10-02 | End: 2023-10-06 | Stop reason: HOSPADM

## 2023-10-02 RX ORDER — ATORVASTATIN CALCIUM 20 MG/1
20 TABLET, FILM COATED ORAL NIGHTLY
Status: DISCONTINUED | OUTPATIENT
Start: 2023-10-02 | End: 2023-10-06 | Stop reason: HOSPADM

## 2023-10-02 RX ORDER — LACOSAMIDE 50 MG/1
50 TABLET ORAL 2 TIMES DAILY
Status: COMPLETED | OUTPATIENT
Start: 2023-10-02 | End: 2023-10-02

## 2023-10-02 RX ORDER — CLONAZEPAM 0.5 MG/1
0.25 TABLET ORAL 2 TIMES DAILY
Status: DISCONTINUED | OUTPATIENT
Start: 2023-10-02 | End: 2023-10-02

## 2023-10-02 RX ORDER — IBUPROFEN 600 MG/1
600 TABLET ORAL EVERY 6 HOURS PRN
Status: DISCONTINUED | OUTPATIENT
Start: 2023-10-02 | End: 2023-10-06 | Stop reason: HOSPADM

## 2023-10-02 RX ORDER — AMOXICILLIN 250 MG
2 CAPSULE ORAL 2 TIMES DAILY
Status: DISCONTINUED | OUTPATIENT
Start: 2023-10-02 | End: 2023-10-06 | Stop reason: HOSPADM

## 2023-10-02 RX ORDER — ONDANSETRON 4 MG/1
4 TABLET, ORALLY DISINTEGRATING ORAL EVERY 4 HOURS PRN
Status: DISCONTINUED | OUTPATIENT
Start: 2023-10-02 | End: 2023-10-06 | Stop reason: HOSPADM

## 2023-10-02 RX ORDER — ALBUTEROL SULFATE 90 UG/1
2 AEROSOL, METERED RESPIRATORY (INHALATION) EVERY 4 HOURS PRN
Status: DISCONTINUED | OUTPATIENT
Start: 2023-10-02 | End: 2023-10-06 | Stop reason: HOSPADM

## 2023-10-02 RX ORDER — ONDANSETRON 2 MG/ML
4 INJECTION INTRAMUSCULAR; INTRAVENOUS EVERY 4 HOURS PRN
Status: DISCONTINUED | OUTPATIENT
Start: 2023-10-02 | End: 2023-10-06 | Stop reason: HOSPADM

## 2023-10-02 RX ORDER — BISACODYL 10 MG
10 SUPPOSITORY, RECTAL RECTAL
Status: DISCONTINUED | OUTPATIENT
Start: 2023-10-02 | End: 2023-10-06 | Stop reason: HOSPADM

## 2023-10-02 RX ORDER — ENOXAPARIN SODIUM 100 MG/ML
40 INJECTION SUBCUTANEOUS DAILY
Status: DISCONTINUED | OUTPATIENT
Start: 2023-10-02 | End: 2023-10-06 | Stop reason: HOSPADM

## 2023-10-02 RX ORDER — LORAZEPAM 2 MG/ML
1 INJECTION INTRAMUSCULAR
Status: DISCONTINUED | OUTPATIENT
Start: 2023-10-02 | End: 2023-10-06 | Stop reason: HOSPADM

## 2023-10-02 RX ORDER — POLYETHYLENE GLYCOL 3350 17 G/17G
1 POWDER, FOR SOLUTION ORAL
Status: DISCONTINUED | OUTPATIENT
Start: 2023-10-02 | End: 2023-10-06 | Stop reason: HOSPADM

## 2023-10-02 RX ORDER — VITAMIN B COMPLEX
2000 TABLET ORAL DAILY
Status: DISCONTINUED | OUTPATIENT
Start: 2023-10-02 | End: 2023-10-06 | Stop reason: HOSPADM

## 2023-10-02 RX ORDER — CLONAZEPAM 0.5 MG/1
0.25 TABLET ORAL EVERY EVENING
Status: DISCONTINUED | OUTPATIENT
Start: 2023-10-02 | End: 2023-10-06 | Stop reason: HOSPADM

## 2023-10-02 RX ADMIN — Medication 2000 UNITS: at 17:27

## 2023-10-02 RX ADMIN — ATORVASTATIN CALCIUM 20 MG: 20 TABLET, FILM COATED ORAL at 21:01

## 2023-10-02 RX ADMIN — ESLICARBAZEPINE ACETATE 400 MG: 800 TABLET ORAL at 22:22

## 2023-10-02 RX ADMIN — ENOXAPARIN SODIUM 40 MG: 100 INJECTION SUBCUTANEOUS at 17:27

## 2023-10-02 RX ADMIN — CENOBAMATE 400 MG: 100 TABLET, FILM COATED ORAL at 21:00

## 2023-10-02 RX ADMIN — CLONAZEPAM 0.25 MG: 0.5 TABLET ORAL at 17:26

## 2023-10-02 RX ADMIN — LACOSAMIDE 50 MG: 50 TABLET, FILM COATED ORAL at 17:27

## 2023-10-02 ASSESSMENT — COGNITIVE AND FUNCTIONAL STATUS - GENERAL
SUGGESTED CMS G CODE MODIFIER MOBILITY: CH
SUGGESTED CMS G CODE MODIFIER DAILY ACTIVITY: CH
MOBILITY SCORE: 24
DAILY ACTIVITIY SCORE: 24

## 2023-10-02 ASSESSMENT — LIFESTYLE VARIABLES
HAVE YOU EVER FELT YOU SHOULD CUT DOWN ON YOUR DRINKING: NO
TOTAL SCORE: 0
TOTAL SCORE: 0
EVER FELT BAD OR GUILTY ABOUT YOUR DRINKING: NO
AVERAGE NUMBER OF DAYS PER WEEK YOU HAVE A DRINK CONTAINING ALCOHOL: 0
DOES PATIENT WANT TO STOP DRINKING: NO
ALCOHOL_USE: NO
ON A TYPICAL DAY WHEN YOU DRINK ALCOHOL HOW MANY DRINKS DO YOU HAVE: 0
EVER HAD A DRINK FIRST THING IN THE MORNING TO STEADY YOUR NERVES TO GET RID OF A HANGOVER: NO
HOW MANY TIMES IN THE PAST YEAR HAVE YOU HAD 5 OR MORE DRINKS IN A DAY: 0
CONSUMPTION TOTAL: NEGATIVE
HAVE PEOPLE ANNOYED YOU BY CRITICIZING YOUR DRINKING: NO
TOTAL SCORE: 0

## 2023-10-02 ASSESSMENT — PATIENT HEALTH QUESTIONNAIRE - PHQ9
2. FEELING DOWN, DEPRESSED, IRRITABLE, OR HOPELESS: NOT AT ALL
SUM OF ALL RESPONSES TO PHQ9 QUESTIONS 1 AND 2: 0
1. LITTLE INTEREST OR PLEASURE IN DOING THINGS: NOT AT ALL
2. FEELING DOWN, DEPRESSED, IRRITABLE, OR HOPELESS: NOT AT ALL
1. LITTLE INTEREST OR PLEASURE IN DOING THINGS: NOT AT ALL
SUM OF ALL RESPONSES TO PHQ9 QUESTIONS 1 AND 2: 0

## 2023-10-02 ASSESSMENT — FIBROSIS 4 INDEX
FIB4 SCORE: 1.15
FIB4 SCORE: 1.15

## 2023-10-02 ASSESSMENT — PAIN DESCRIPTION - PAIN TYPE
TYPE: ACUTE PAIN
TYPE: ACUTE PAIN

## 2023-10-02 NOTE — RESPIRATORY CARE
" COPD EDUCATION by COPD CLINICAL EDUCATOR  10/2/2023 at 8:07 AM by Jennifer Caicedo, RRT     Patient reviewed by COPD education team. Patient does not have a history/diagnosis of COPD and is a non-smoker. PFT on file from 9/24/2022, FEV1/FVC 75%, interpreted as \"spirometry is normal\".  "

## 2023-10-02 NOTE — H&P
NEUROLOGY EMU H&P 10/2/2023     REFERRING PROVIDER: Dr. Franklin    REASON FOR ADMISSION: Reason for EMU Admission: Medication titration/optimization and Presurgical work-up     HISTORY OF PRESENT ILLNESS: Fredy Zavala is a 63 y.o. male who presents to the EMU for evaluation.    RELEVANT PMHx:   Patient with intractable focal epilepsy (bilateral mesial temporal sclerosis; question of left mesial frontal onset due to stable mass on prior imaging) who presents to the epilepsy monitoring unit for presurgical work-up and to undergo a supervised medication titration/optimization.  Patient has been transitioning onto Xcopri and is currently taking 400 mg nightly, the maximum dose.  Any attempts to wean him off one or more of his other medications have resulted in breakthrough seizures (focal unaware seizures and most recently a few weeks ago focal to bilateral tonic-clonic seizure prolonged lasting several minutes with several hours of postictal confusion).  Because of the difficulty of making medication changes in an outpatient basis and because of his recent prolonged focal to bilateral tonic-clonic seizure which poses a serious risk to his health, he warrants inpatient admission while medication adjustments are made with very close supervision.  Therefore, inpatient admission is a medical necessity as medications are optimized.     Patient feels more sedated since increasing Xcopri to 400 mg QHS and since adding back and increasing vimpat for further antiseizure protection. He has had only 1 focal to bilateral tonic clonic seizure lasting several minutes and with post-ictal confusion lasting hours > 2 month ago in the setting of medication weaning which prompted admission to the EMU this week. If patient is unable wean off his medications and/or continues to have seizures, then we will discuss getting updated MRI Brain w/wo contrast epilepsy protocol and referring to epilepsy level 4 center with consideration for  phase II monitoring for epilepsy surgery. Since our last visit in August he has had a couple focal unaware seizures but no big FBLTC seizure.        Current antiseizure regimen:  Xcopri 400 mg QHD  Vimpat 100 mg BID  Aptiom 400 mg QHS  Clonazepam 0.25 mg QHS              COMPLETE HOME MED LIST:     Current Facility-Administered Medications:     enoxaparin (LOVENOX) injection    ibuprofen    LORazepam **OR** LORazepam    ondansetron **OR** ondansetron    senna-docusate **AND** polyethylene glycol/lytes **AND** magnesium hydroxide **AND** bisacodyl    albuterol    atorvastatin    cenobamate    vitamin D3    clonazePAM    eslicarbazepine    [START ON 10/3/2023] fluticasone-umeclidinium-vilanterol    lacosamide     MEDICATION ALLERGIES:  No Known Allergies    REVIEW OF SYSTEMS:   ROS negative except that which is mentioned above    PAST MEDICAL HISTORY:   Past Medical History:   Diagnosis Date    ASTHMA     Cancer (HCC) 05/2012    prostate    COPD (chronic obstructive pulmonary disease) (HCC)     Dyslipidemia     Partial epilepsy with impairment of consciousness (HCC)      ICD-10 transition     PAST SURGICAL HISTORY:   Past Surgical History:   Procedure Laterality Date    PROSTATECTOMY ROBOTIC  5/29/2012    Performed by JACOB RAMOS at [facility]GERY [facility]    COLONOSCOPY  8/10    grade 1 int. hemorrhoids     FAMILY HISTORY:   Family History   Problem Relation Age of Onset    Diabetes Father     Alcohol/Drug Brother     Cancer Neg Hx     Stroke Neg Hx     Heart Disease Neg Hx      SOCIAL HISTORY:   Social History     Socioeconomic History    Marital status:      Spouse name: Not on file    Number of children: Not on file    Years of education: Not on file    Highest education level: 12th grade   Occupational History    Not on file   Tobacco Use    Smoking status: Never    Smokeless tobacco: Never   Vaping Use    Vaping Use: Never used   Substance and Sexual Activity    Alcohol use: Not Currently      Comment: rare    Drug use: Not Currently     Comment: previous    Sexual activity: Yes     Partners: Female   Other Topics Concern    Not on file   Social History Narrative    Retired      Social Determinants of Health     Financial Resource Strain: Low Risk  (9/18/2022)    Overall Financial Resource Strain (CARDIA)     Difficulty of Paying Living Expenses: Not very hard   Food Insecurity: No Food Insecurity (9/18/2022)    Hunger Vital Sign     Worried About Running Out of Food in the Last Year: Never true     Ran Out of Food in the Last Year: Never true   Transportation Needs: No Transportation Needs (9/18/2022)    PRAPARE - Transportation     Lack of Transportation (Medical): No     Lack of Transportation (Non-Medical): No   Physical Activity: Sufficiently Active (9/18/2022)    Exercise Vital Sign     Days of Exercise per Week: 7 days     Minutes of Exercise per Session: 30 min   Stress: No Stress Concern Present (9/18/2022)    [employer] - Occupational Stress Questionnaire     Feeling of Stress : Not at all   Social Connections: Socially Isolated (9/18/2022)    Social Connection and Isolation Panel [NHANES]     Frequency of Communication with Friends and Family: Once a week     Frequency of Social Gatherings with Friends and Family: Never     Attends Buddhism Services: Never     Active Member of Clubs or Organizations: No     Attends Club or Organization Meetings: Never     Marital Status:    Intimate Partner Violence: Not on file   Housing Stability: Unknown (9/18/2022)    Housing Stability Vital Sign     Unable to Pay for Housing in the Last Year: No     Number of Places Lived in the Last Year: Not on file     Unstable Housing in the Last Year: No       PRIOR WORK-UP TO DATE:    imaging  MRI Brain August 2015:  FINDINGS:     There is again seen extraaxial mass effect in the left   supraclinoid region.  There is again seen inferior impression on the left   hypothalamus and  subfrontal region.  This mass appears to measure less   than 1 cm in craniocaudad dimension.  This also measures just over 1 cm   in transverse dimension.  This is characterized by hypointense signal in   all sequences with no evidence of enhancement.  The overall size and   signal characteristics are stable from comparison.  There is some   uplifting of the left supraclinoid carotid artery as well as the middle   cerebral artery branches, stable from comparison. There is no evidence of   midline shift or focal edema.  No evidence of abnormal diffusion-weighted   signal.  The CSF spaces are otherwise normal in caliber.  No extraaxial   fluid collections are seen.  There is again noted increased signal within   the medial temporal lobes bilaterally which is stable from comparison.     There is normal flow void seen within the visualized intracranial   vascular structures.  Bilateral maxillary sinus disease is again seen.     Ethmoid and sphenoid sinus disease is seen as well.        Impression  IMPRESSION:     1. 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  THE OVERALL SIZE AND SIGNAL   CHARACTERISTICS APPEAR STABLE FROM COMPARISON AND DIFFERENTIAL REMAINS AS   PREVIOUSLY DISCUSSED.       2. INCREASED FLAIR SIGNAL SEEN WITHIN THE MESIAL TEMPORAL LOBES WHICH CAN   BE SEEN IN THE SETTING OF MESIAL TEMPORAL SCLEROSIS.  THIS IS UNCHANGED   FROM COMPARISON.       3. MAXILLARY, ETHMOID, AND SPHENOID SINUS DISEASE.   prior eegs:  March 2022 most recent:  INTERPRETATION:   Abnormal video EEG recording in the awake and drowsy state(s):  -Intermittent left frontotemporal theta>delta slowing, suggestive of focal dysfunction and/or structural abnormality in this region. Clinical and radiographic correlation recommended.  - No epileptiform discharges seen   - No seizures. Clinical correlation is recommended.      PHYSICAL EXAM:   /73   Pulse 60   Temp  36.6 °C (97.8 °F) (Temporal)   Resp 17   Wt 67.4 kg (148 lb 9.4 oz)   SpO2 98%     Physical Exam  Constitutional:       General: He is not in acute distress.  HENT:      Head: Normocephalic.      Nose: Nose normal.      Mouth/Throat:      Pharynx: Uvula swelling present.      Comments: Enlarged tongue and marked enlarged and long uvula with minor swelling  Cardiovascular:      Rate and Rhythm: Normal rate.   Neurological:      Deep Tendon Reflexes:      Reflex Scores:       Tricep reflexes are 2+ on the right side and 2+ on the left side.       Bicep reflexes are 2+ on the right side and 2+ on the left side.       Brachioradialis reflexes are 2+ on the right side and 2+ on the left side.       Patellar reflexes are 3+ on the right side and 3+ on the left side.       Achilles reflexes are 2+ on the right side and 2+ on the left side.  Psychiatric:         Speech: Speech normal.        NEUROLOGICAL EXAM:   Neurological Exam  Mental Status   Oriented only to person, place, time and situation. Recalls 2 of 3 objects immediately. At 3 minutes recalls 2 of 3 objects. Recalls 1 of 3 objects with prompting. Speech is normal. Able to name objects. Follows complex commands. Digit span was 5 forward Fund of knowledge is appropriate for level of education.  Able to name months of years forward and backwards, complex sentence reprtiytion intact, abstract thinking and understanding intact. Speech and thinking is slower and more hesitant than usual.    Motor   No pronator drift.    Sensory  Light touch is normal in upper and lower extremities.     Reflexes                                            Right                      Left  Brachioradialis                    2+                         2+  Biceps                                 2+                         2+  Triceps                                2+                         2+  Patellar                                3+                         3+  Achilles                                 2+                         2+    Coordination  Right: Finger-to-nose abnormality: Rapid alternating movement abnormality:Left: Finger-to-nose abnormality: Rapid alternating movement abnormality:  Mild dysmetria on finger-nose-finger.    Gait    Deferred.       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  Patient has been transitioning onto Xcopri and is currently taking 400 mg nightly, the maximum dose.  Any attempts to wean him off one or more of his other medications have resulted in breakthrough seizures (focal unaware seizures and most recently a few weeks ago focal to bilateral tonic-clonic seizure prolonged lasting several minutes with several hours of postictal confusion).  Because of the difficulty of making medication changes in an outpatient basis and because of his recent prolonged focal to bilateral tonic-clonic seizure which poses a serious risk to his health, he warrants inpatient admission while medication adjustments are made with very close supervision.  Therefore, inpatient admission is a medical necessity as medications are optimized.      PLAN:    Intractable focal epilepsy  Semiology  Focal unaware seizures with staring off, unresponsiveness, lip smacking, hand automatisms  Focal to bilateral tonic clonic seizure with with prolonged convulsions  Continuous VEEG monitoring  Vitals and neurological checks as ordered  Telemetry  Other diagnostics if applicable: NA  HV and PS to be performed on Day 1 of admission and at the discretion of the attending epileptologist on subsequent days  Rescue Ativan Protocol ordered  Sleep deprivation: No  Active antiseizure regimen:  Xcopri 400 mg QHD  Vimpat 100 mg in  AM of day 1/decreased to 50 mg of day 2 then stopping thereafter  Aptiom 400 mg QHS  Clonazepam 0.25 mg QHS  Will consider getting up[dated MRI Brain as an outpatient  Consider referral to epilepsy level 4 center for consideration for phase II monitoring    OTHER ITEMS:  DVT Ppx: Lovenox and/or SCDs  DIET: Regular  Bowel regimen  PRN analgesics available for pain  PRN antiemetics available    CODE STATUS:   FULL CODE    BILLING DOCUMENTATION:     I spent a total of 80 minutes of face-to-face time in this visit. Over 50% of the time of the visit today was spent on counseling and/or coordination of care wtih the patient and/or family, as above in assessment in plan.    Fredy Franklin MD  Department of Neurology at [facility]  General Neurologist and Epileptologist  Director of [facility] Level III Comprehensive Epilepsy Program  Professor of Clinical Neurology, [facility].   Phone: [phone]  Fax: [phone]  E-mail: [email or web address]

## 2023-10-02 NOTE — PROCEDURES
VIDEO ELECTROENCEPHALOGRAM REPORT - EPILEPSY MONITORING UNIT (EMU)     REFERRING PROVIDER: Dr. Franklin  DOS: 10/02/23   ROOM: [place] [address]   TOTAL RECORDING TIME: 23 hours and 36 minutes of total recording time    INDICATION:  Fredy Zavala 63 y.o. male presenting with  intractable focal epilepsy    RELEVANT MEDICATIONS/TREATMENTS:   cenobamate, 400 mg, QHS  eslicarbazepine, 400 mg, QHS  clonazePAM, 0.25 mg, Q EVENING  Vimpat 100 mg morning of 10/02/23; 50 mg evening of 10/02/23 then STOPPED       TECHNIQUE:   CVEEG was set up by a Neurodiagnostic technologist who performed education to the patient and staff. A minimum of 23 electrodes and 23 channel recording was setup and performed by Neurodiagnostic technologist, in accordance with the international 10-20 system. Impedence, electrode integrity, and technical impressions were documented a minimum of every 2-24 hour period by a Neurodiagnostic Technologist and reviewed by Interpreting Physician. The study was reviewed in bipolar and referential montages. The recording examined the patient in the awake and drowsy/sleep state(s).     DESCRIPTION OF THE RECORD:  During wakefulness, the background was continuous and showed a 8-9 Hz posterior dominant rhythm, with a mixture of excess generalized theta, as well as alpha/beta frequencies  There was reactivity to eye closure/opening.  A normal anterior-posterior gradient was noted with faster beta frequencies seen anteriorly.  During drowsiness, theta/delta frequencies were seen.    Sleep was captured and was characterized by diffuse background delta/theta activity with a loss of myogenic artifact.  N2 sleep transients in the form of sleep spindles and vertex waves were seen in the leads over the central regions.     ICTAL AND INTERICTAL FINDINGS:   Rare left anterior inferior temporal maximal ( F9T9>F7T3) sharp and slow wave discharges seen while awake. Discharges were frequent to abundantly present during drowsiness, and  at times occurred in brief periodic or quasi-periodic 1 Hz runs (LPDs as pictured below).        Occasional centro-parietal and midline parietal sharply contoured, often shifting in laterality and rarely bilaterally seen, with brief interrupted 1-2 Hz runs of qausirhythmic>rhythmic activity with overriding spiky beta and/or midline and left>right parietal maximal positive spikes, at times suspiciously epileptiform (pictured below with spikes marked with purple ink)         No definitive seizures    ACTIVATION PROCEDURES:   Intermittent Photic stimulation was performed in a stepwise fashion from 1 to 30 Hz and did not elicited any abnormalities on EEG.     EKG: EKG was intermittently irriegular at times, at times bradycardic briefly in 30-40 BPM range.    EVENTS:  None    INTERPRETATION:  Abnormal video EEG recording in the awake and drowsy/sleep state(s):  - Rare left anterior inferior temporal sharp and slow wave discharges seen while awake. These discharges became frequent to abundantly present during drowsiness and sleep, and at times occurred in brief periodic or quasi-periodic 1 Hz runs (LPDs). These findings are suggestive of an increased risk for seizures arising from this region and are consistent with prior EEGs.  -Occasional centro-parietal and midline parietal sharply contoured, often shifting in laterality and rarely bilaterally seen, with brief interrupted 1-2 Hz runs of qausirhythmic>rhythmic activity with overriding spiky beta and/or midline and left>right parietal maximal positive spikes, at times suspiciously epileptiform . These findings suggest nonspecific focal dysfunction in this region as well as a potential independent deep centro-parietal mesial epileptgoenic focus. Clinical and radiographic correlation recommended. Compared to prior EEGs, this findings is a new finding not previously seen or reported on and the additional inferior temporal electrodes added may account for this new finding.  Further monitoring is needed to clarify its significance.   -Occasional mild background slowing in the form of excess theta suggestive of a nonspecific encephalopathy. This may also reflect excessive drowsiness which can be a normal finding in sleep deprived [patients. Clinical correlation advised  - No definitive seizures. Clinical correlation is recommended.  -No clinical event reported or recorded  -      Fredy Franklin MD  Department of Neurology at [facility]  General Neurologist and Epileptologist  Director of [facility]'s Level III Comprehensive Epilepsy Program  Professor of Clinical Neurology, [facility].   Phone: [phone]  Fax: [phone]  E-mail: [email or web address]

## 2023-10-02 NOTE — PROGRESS NOTES
4 Eyes Skin Assessment Completed by Yoselin, RN and Connor RN.    Head WDL  Ears WDL  Nose WDL  Mouth WDL  Neck WDL  Breast/Chest WDL  Shoulder Blades WDL  Spine WDL  (R) Arm/Elbow/Hand WDL  (L) Arm/Elbow/Hand WDL  Abdomen WDL  Groin WDL  Scrotum/Coccyx/Buttocks WDL  (R) Leg WDL  (L) Leg WDL  (R) Heel/Foot/Toe WDL  (L) Heel/Foot/Toe WDL          Devices In Places Tele Box, Pulse Ox, and SCD's      Interventions In Place Pillows    Possible Skin Injury No    Pictures Uploaded Into Epic N/A  Wound Consult Placed N/A  RN Wound Prevention Protocol Ordered No

## 2023-10-03 PROCEDURE — 700111 HCHG RX REV CODE 636 W/ 250 OVERRIDE (IP): Mod: JZ | Performed by: STUDENT IN AN ORGANIZED HEALTH CARE EDUCATION/TRAINING PROGRAM

## 2023-10-03 PROCEDURE — 95720 EEG PHY/QHP EA INCR W/VEEG: CPT | Performed by: STUDENT IN AN ORGANIZED HEALTH CARE EDUCATION/TRAINING PROGRAM

## 2023-10-03 PROCEDURE — G0316 PR PROLONGED IP/OBS E&M EA 15 MIN: HCPCS | Performed by: STUDENT IN AN ORGANIZED HEALTH CARE EDUCATION/TRAINING PROGRAM

## 2023-10-03 PROCEDURE — 99233 SBSQ HOSP IP/OBS HIGH 50: CPT | Mod: 25 | Performed by: STUDENT IN AN ORGANIZED HEALTH CARE EDUCATION/TRAINING PROGRAM

## 2023-10-03 PROCEDURE — 770020 HCHG ROOM/CARE - TELE (206)

## 2023-10-03 PROCEDURE — 95714 VEEG EA 12-26 HR UNMNTR: CPT | Performed by: STUDENT IN AN ORGANIZED HEALTH CARE EDUCATION/TRAINING PROGRAM

## 2023-10-03 PROCEDURE — 700102 HCHG RX REV CODE 250 W/ 637 OVERRIDE(OP): Performed by: STUDENT IN AN ORGANIZED HEALTH CARE EDUCATION/TRAINING PROGRAM

## 2023-10-03 PROCEDURE — 4A10X4Z MONITORING OF CENTRAL NERVOUS ELECTRICAL ACTIVITY, EXTERNAL APPROACH: ICD-10-PCS | Performed by: STUDENT IN AN ORGANIZED HEALTH CARE EDUCATION/TRAINING PROGRAM

## 2023-10-03 PROCEDURE — A9270 NON-COVERED ITEM OR SERVICE: HCPCS | Performed by: STUDENT IN AN ORGANIZED HEALTH CARE EDUCATION/TRAINING PROGRAM

## 2023-10-03 RX ADMIN — CENOBAMATE 300 MG: 100 TABLET, FILM COATED ORAL at 21:31

## 2023-10-03 RX ADMIN — CLONAZEPAM 0.25 MG: 0.5 TABLET ORAL at 18:12

## 2023-10-03 RX ADMIN — ESLICARBAZEPINE ACETATE 400 MG: 800 TABLET ORAL at 21:31

## 2023-10-03 RX ADMIN — ENOXAPARIN SODIUM 40 MG: 100 INJECTION SUBCUTANEOUS at 18:13

## 2023-10-03 RX ADMIN — Medication 2000 UNITS: at 18:12

## 2023-10-03 RX ADMIN — ATORVASTATIN CALCIUM 20 MG: 20 TABLET, FILM COATED ORAL at 21:31

## 2023-10-03 RX ADMIN — FLUTICASONE FUROATE, UMECLIDINIUM BROMIDE AND VILANTEROL TRIFENATATE 1 PUFF: 200; 62.5; 25 POWDER RESPIRATORY (INHALATION) at 06:13

## 2023-10-03 ASSESSMENT — PAIN DESCRIPTION - PAIN TYPE
TYPE: ACUTE PAIN
TYPE: ACUTE PAIN

## 2023-10-03 ASSESSMENT — FIBROSIS 4 INDEX: FIB4 SCORE: 1.15

## 2023-10-03 NOTE — PROCEDURES
VIDEO ELECTROENCEPHALOGRAM REPORT - EPILEPSY MONITORING UNIT (EMU)     REFERRING PROVIDER: Dr. Franklin  DOS: 10/03/23   ROOM: [place] [address]   TOTAL RECORDING TIME: 23 hours and 58 minutes of total recording time    INDICATION:  Fredy Zavala 63 y.o. male presenting with  intractable focal epilepsy    RELEVANT MEDICATIONS/TREATMENTS:   cenobamate, 400 mg, QHS  eslicarbazepine, 400 mg, QHS  clonazePAM, 0.25 mg, Q EVENING         TECHNIQUE:   CVEEG was set up by a Neurodiagnostic technologist who performed education to the patient and staff. A minimum of 23 electrodes and 23 channel recording was setup and performed by Neurodiagnostic technologist, in accordance with the international 10-20 system. Impedence, electrode integrity, and technical impressions were documented a minimum of every 2-24 hour period by a Neurodiagnostic Technologist and reviewed by Interpreting Physician. The study was reviewed in bipolar and referential montages. The recording examined the patient in the awake and drowsy/sleep state(s).     DESCRIPTION OF THE RECORD:  During wakefulness, the background was continuous and showed a 9 Hz posterior dominant rhythm.  A normal anterior-posterior gradient was noted with faster beta frequencies seen anteriorly.  During drowsiness, theta/delta frequencies were seen.    Sleep was captured and was characterized by diffuse background delta/theta activity with a loss of myogenic artifact.  N2 sleep transients in the form of sleep spindles and vertex waves were seen in the leads over the central regions.     ICTAL AND INTERICTAL FINDINGS:   Occasional to frequent sleep activated left anterior inferior temporal maximal ( F9T9>F7T3) sharp and slow wave discharges seen while awake, rarely occurring in brief periodic or quasi-periodic 1-1.5 Hz runs     Rare to occasional bilateral right>left centro-parietal and midline parietal sharply contoured slowing, often shifting in laterality and occurring in brief 1-2 Hz  qausi-rhythmic>rhythmic runs with overriding spiky beta and/or midline and left>right parietal maximal low voltage  spikes, at times suspiciously epileptiform . T    No definitive seizures    ACTIVATION PROCEDURES:   NA    EKG: EKG was intermittently irriegular at times, at times bradycardic briefly in 30-40 BPM range.    EVENTS:  None    INTERPRETATION:  Abnormal video EEG recording in the awake and drowsy/sleep state(s):  - Occasional to frequent sleep activated left anterior inferior temporal maximal ( F9T9>F7T3) sharp and slow wave discharges seen while awake, rarely occurring in brief periodic or quasi-periodic 1-1.5 Hz runs. These findings are suggestive of an increased risk for seizures arising from this region and are consistent with prior EEGs.  -rare to occasional bilateral right>left centro-parietal and midline parietal sharply contoured slowing, often shifting in laterality and occurring in brief 1-2 Hz qausi-rhythmic>rhythmic runs with overriding spiky beta and/or midline and left>right parietal maximal low voltage  spikes, at times suspiciously epileptiform . These findings suggest nonspecific focal dysfunction in this region as well as a potential independent deep centro-parietal mesial epileptgoenic focus. Clinical and radiographic correlation recommended. Compared to prior EEGs, this findings is a new finding not previously seen or reported on and the additional inferior temporal electrodes added may account for this new finding. Further monitoring is needed to clarify its significance.   - No definitive seizures. Clinical correlation is recommended.  -No clinical events reported or recorded  -Compared to yesterday's study, there was improvement in background since the background slowing was not seen. Also, the left temporal discharges were less frequently seen.      Fredy Franklin MD  Department of Neurology at [facility]  General Neurologist and Epileptologist  Director  of [facility] Level III Comprehensive [facility]ile[facility]  Professor of Clinical Neurology, [facility].   Phone: [phone]  Fax: [phone]  E-mail: [email or web address]

## 2023-10-03 NOTE — CARE PLAN
Problem: Seizure Precautions  Goal: Implementation of seizure precautions  Description: Target End Date:  Prior to discharge or change in level of care    1.  Padded side rails up at all times  2.  Suction equipment and oxygen delivery system at bedside  3.  Continuous pulse oximeter in use  4.  Implement fall precautions, bed alarm on, bed in lowest position  5.  IV access (per order)  6.  Provide low stimulus environment, avoid exposure to triggers  7.  Instruct patient to use call light/seizure button if having warning signs of impending seizure  Outcome: Progressing     Problem: Seizure EEG Monitoring  Goal: Appropriate Monitoring of EEG patient  Description: Target End Date:  Prior to discharge or change in level of care    1.  Ensure patient in view of camera at all times, do not block camera, curtains are pulled, door remains open  2.  May be off camera for bathroom privileges with assistance from staff, do not leave patient in bathroom unattended  3.  Face to face evaluation every 10 minutes or continuous video monitoring by sitter  4.  Review order for remote cardiac monitoring  5.  Press event button, observe and document events  Outcome: Progressing     Problem: Medication  Goal: Risk for seizure medication side effects will decrease  Description: Target End Date:  Prior to discharge or change in level of care    1.  Monitor for potential side effects of medications  2.  Monitor for signs and symptoms of medication toxicity  3.  Monitor lab values for medications for toxicity and subtherapeutic levels  Outcome: Progressing     Problem: Knowledge Deficit - Seizures  Goal: Knowledge of seizure treatment regimen will improve  Description: Target End Date:  1-3 days or as soon as patient condition allows    Document in Patient Education    1.  Educate patient and family/caregiver of importance of seizure precautions in hospital  2.  Educate patient and family/caregiver to report auras and triggers prior to  events/ EMU event reporting  3.  Review anticonvulsant medication regimen and side effects  Outcome: Progressing   The patient is Watcher - Medium risk of patient condition declining or worsening    Shift Goals  Clinical Goals: watch for seizures  Patient Goals: no seizures  Family Goals: no seizures    Progress made toward(s) clinical / shift goals:      Patient is not progressing towards the following goals:

## 2023-10-03 NOTE — CARE PLAN
The patient is Stable - Low risk of patient condition declining or worsening    Shift Goals  Clinical Goals: monitor for seizures  Patient Goals: rest  Family Goals: JIMBO    Progress made toward(s) clinical / shift goals:  VEEG sitter in place throughout the night. Seizure precautions in place.     Patient is not progressing towards the following goals:      Problem: Seizure Precautions  Goal: Implementation of seizure precautions  Outcome: Progressing  Note: Rails have been padded and patient had been educated on the use of the EEG event button.      Problem: Medication  Goal: Risk for seizure medication side effects will decrease  Outcome: Progressing  Note: Patient verbalizes an understanding of his medications and what they do for him.

## 2023-10-03 NOTE — PROGRESS NOTES
NEUROLOGY EMU DAILY PROGRESS NOTE 10/3/2023     REFERRING PROVIDER: Dr. Franklin    REASON FOR ADMISSION: Reason for EMU Admission: Medication titration/optimization and Presurgical work-up. 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  Patient has been transitioning to Xcopri and is currently taking 400 mg nightly, the maximum dose.  Any attempts to wean him off one or more of his other medications have resulted in breakthrough seizures (focal unaware seizures and most recently a few weeks ago focal to bilateral tonic-clonic seizure prolonged lasting several minutes with several hours of postictal confusion).  Because of the difficulty of making medication changes in an outpatient basis and because of his recent prolonged focal to bilateral tonic-clonic seizure which poses a serious risk to his health, he warrants inpatient admission while medication adjustments are made with very close supervision.  Therefore, inpatient admission is a medical necessity as medications are optimized.     INTERVAL  HISTORY:   No overnight events    Patient arrived on vimpat 100 mg BID. This was rapidly tapered off. He received 100 mg the morning of the admission. It was then decreased to 50 mg in the evening of day on and then discontinued altogether thereafter.     EEG showed left anterior inferior left temporal discharges intermittently periodic. Midline parietal and bilateral centro-parietal slowing and suspicious low voltage polyspike were seen as well, which was a new finding now prreviousl;y appreciated on other EEGs. Ultimately., no seizures were seen. No clinical events. Mild background slowing was seen as  well likely reflecting nonspecific encephalopathy.  Patient tolerated  demarco        CURRENT INPATIENT MED LIST:   SCHEDULED  cenobamate, 300 mg, QHS  enoxaparin (LOVENOX) injection, 40 mg, DAILY AT 1800  senna-docusate, 2 Tablet, BID  atorvastatin, 20 mg, Nightly  vitamin D3, 2,000 Units, DAILY AT 1800  eslicarbazepine, 400 mg, QHS  fluticasone-umeclidinium-vilanterol, 1 Puff, DAILY  clonazePAM, 0.25 mg, Q EVENING    PRN  ibuprofen, 600 mg, Q6HRS PRN  LORazepam, 1 mg, Once PRN   Or  LORazepam, 2 mg, Once PRN  ondansetron, 4 mg, Q4HRS PRN   Or  ondansetron, 4 mg, Q4HRS PRN  polyethylene glycol/lytes, 1 Packet, QDAY PRN   And  magnesium hydroxide, 30 mL, QDAY PRN   And  bisacodyl, 10 mg, QDAY PRN  albuterol, 2 Puff, Q4HRS PRN               PRIOR WORK-UP TO DATE:    imaging  MRI Brain August 2015:  FINDINGS:     There is again seen extraaxial mass effect in the left   supraclinoid region.  There is again seen inferior impression on the left   hypothalamus and subfrontal region.  This mass appears to measure less   than 1 cm in craniocaudad dimension.  This also measures just over 1 cm   in transverse dimension.  This is characterized by hypointense signal in   all sequences with no evidence of enhancement.  The overall size and   signal characteristics are stable from comparison.  There is some   uplifting of the left supraclinoid carotid artery as well as the middle   cerebral artery branches, stable from comparison. There is no evidence of   midline shift or focal edema.  No evidence of abnormal diffusion-weighted   signal.  The CSF spaces are otherwise normal in caliber.  No extraaxial   fluid collections are seen.  There is again noted increased signal within   the medial temporal lobes bilaterally which is stable from comparison.     There is normal flow void seen within the visualized intracranial   vascular structures.  Bilateral maxillary sinus disease is again seen.     Ethmoid and sphenoid sinus disease is seen as well.        Impression  IMPRESSION:     1. 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  THE OVERALL SIZE AND SIGNAL   CHARACTERISTICS APPEAR STABLE FROM COMPARISON AND DIFFERENTIAL REMAINS AS   PREVIOUSLY DISCUSSED.       2. INCREASED FLAIR SIGNAL SEEN WITHIN THE MESIAL TEMPORAL LOBES WHICH CAN   BE SEEN IN THE SETTING OF MESIAL TEMPORAL SCLEROSIS.  THIS IS UNCHANGED   FROM COMPARISON.       3. MAXILLARY, ETHMOID, AND SPHENOID SINUS DISEASE.   prior eegs:  March 2022 most recent:  INTERPRETATION:   Abnormal video EEG recording in the awake and drowsy state(s):  -Intermittent left frontotemporal theta>delta slowing, suggestive of focal dysfunction and/or structural abnormality in this region. Clinical and radiographic correlation recommended.  - No epileptiform discharges seen   - No seizures. Clinical correlation is recommended.      PHYSICAL EXAM:   /68   Pulse 61   Temp 36.5 °C (97.7 °F) (Temporal)   Resp 16   Wt 67.8 kg (149 lb 7.6 oz)   SpO2 94%     Physical Exam  Constitutional:       General: He is not in acute distress.  HENT:      Head: Normocephalic.      Nose: Nose normal.      Mouth/Throat:      Pharynx: Uvula swelling present.      Comments: Enlarged tongue and marked enlarged and long uvula with minor swelling  Cardiovascular:      Rate and Rhythm: Normal rate.   Neurological:      Deep Tendon Reflexes:      Reflex Scores:       Tricep reflexes are 2+ on the right side and 2+ on the left side.       Bicep reflexes are 2+ on the right side and 2+ on the left side.       Brachioradialis reflexes are 2+ on the right side and 2+ on the left side.       Patellar reflexes are 3+ on the right side and 3+ on the left side.       Achilles reflexes are 2+ on the right side and 2+ on the left side.  Psychiatric:         Speech: Speech normal.        NEUROLOGICAL EXAM:   Neurological Exam  Mental Status   Oriented only to person, place, time and situation. Recalls 2 of 3 objects immediately. At  3 minutes recalls 2 of 3 objects. Recalls 1 of 3 objects with prompting. Speech is normal. Able to name objects. Follows complex commands. Digit span was 5 forward Fund of knowledge is appropriate for level of education.  Able to name months of years forward and backwards, complex sentence reprtiytion intact, abstract thinking and understanding intact. Speech and thinking is slower and more hesitant than usual.    Motor   No pronator drift.    Sensory  Light touch is normal in upper and lower extremities.     Reflexes                                            Right                      Left  Brachioradialis                    2+                         2+  Biceps                                 2+                         2+  Triceps                                2+                         2+  Patellar                                3+                         3+  Achilles                                2+                         2+    Coordination  Right: Finger-to-nose abnormality: Rapid alternating movement abnormality:Left: Finger-to-nose abnormality: Rapid alternating movement abnormality:  Mild dysmetria on finger-nose-finger.    Gait    Deferred.       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  Patient has been transitioning onto Xcopri and is currently taking 400 mg nightly, the maximum dose.  Any attempts to wean him off one or more of his other medications have resulted in breakthrough seizures (focal unaware seizures and most recently a few weeks ago focal to bilateral tonic-clonic seizure prolonged lasting several minutes with several hours of postictal confusion).  Because of the difficulty of making medication changes  in an outpatient basis and because of his recent prolonged focal to bilateral tonic-clonic seizure which poses a serious risk to his health, he warrants inpatient admission while medication adjustments are made with very close supervision.  Therefore, inpatient admission is a medical necessity as medications are optimized.        Day 1: Patient arrived on vimpat 100 mg BID. This was rapidly tapered off. He received 100 mg the morning of the admission. It was then decreased to 50 mg in the evening of day on and then discontinued altogether thereafter. Other medications remained the same. His EEG showed showed left temporal discharges as seen on prior discharges and suspicious slowing and spiky activity over the midline parietal and centro-parietal region. Nevertheless no seizures were seen. Patient tolerated rapid weaning of vimpat. He wants to proceed with the plan of coming off aptiom. Discussed possibility of adding Briviact if needed which will likely be better tolerated and avoid pharmacological redundancy that vimpat, aptiom, and Xcopri afford as sodium channel blockers.    Discussed surgical options again should medications fail to control epilepsy. Patient not interested so I will defer further discussion.       PLAN:    Intractable focal epilepsy  Semiology  Focal unaware seizures with staring off, unresponsiveness, lip smacking, hand automatisms  Focal to bilateral tonic clonic seizure with with prolonged convulsions  Continuous VEEG monitoring  Vitals and neurological checks as ordered  Telemetry  Other diagnostics if applicable: NA  HV and PS to be performed on Day 1 of admission and at the discretion of the attending epileptologist on subsequent days  Rescue Ativan Protocol ordered  Sleep deprivation: No  Active antiseizure regimen:  Xcopri 400 mg QHD  Vimpat 100 mg in AM of day 1/decreased to 50 mg PM of day 2 then stopping thereafter  Aptiom 400 mg QHS-> decrease to 300 mg QHS evening of  Day  Clonazepam 0.25 mg QHS  Will consider getting updated MRI Brain as an outpatient  Consider referral to epilepsy level 4 center for consideration for phase II monitoring  Will add briviact as needed. Will load with briviact in addition to ativan IV for any convulsions or prolonged seizure    OTHER ITEMS:  DVT Ppx: Lovenox and/or SCDs  DIET: Regular  Bowel regimen  PRN analgesics available for pain  PRN antiemetics available    CODE STATUS:   FULL CODE    BILLING DOCUMENTATION:     I spent a total of 77 minutes of face-to-face time in this visit. Over 50% of the time of the visit today was spent on counseling and/or coordination of care wtih the patient and/or family, as above in assessment in plan.    Fredy Franklin MD  Department of Neurology at [facility]  General Neurologist and Epileptologist  Director of [facility] Level III Comprehensive Epilepsy Program  Professor of Clinical Neurology, [facility].   Phone: [phone]  Fax: [phone]  E-mail: [email or web address]

## 2023-10-03 NOTE — DISCHARGE SUMMARY
EMU Discharge Summary    ADMISSION DATE: 10/2/2023  6:13 AM  DISCHARGE DATE: 10/6/2023 12:21 PM   REFERRING PROVIDER: Dr. Franklin  REASON(S) FOR ADMISSION: Reason for EMU Admission: Medication titration/optimization and Presurgical work-up     FOLLOW-UP ITEMS AFTER DISCHARGE:  Follow-up with Dr. Franklin in epilepsy clinic as planned/scheduled  Patient to keep symptoms diary/seizure diary  Patient to call/schedule appointment with Sheri Weathers D.N.P. for healthcare maintenance  Patient should follow-up with sleep medicine and ENT referral for ongoing discussion about management approaches to treating/curing sleep apnea that do not involve wearing mask    MEDICATIONS ON DISCHARGE:      Medication List        START taking these medications        Instructions   Clonazepam 0.25 MG Tbdp  Start taking on: October 6, 2023   Take 0.5 Tablets by mouth every evening for 14 days, THEN 0.5 Tablets every 48 hours for 14 days.            CONTINUE taking these medications        Instructions   albuterol 108 (90 Base) MCG/ACT Aers inhalation aerosol   USE 2 INHALATIONS EVERY 6 HOURS AS NEEDED FOR SHORTNESS OF BREATH     atorvastatin 20 MG Tabs  Commonly known as: Lipitor   Take 1 tablet by mouth every evening.  Dose: 20 mg     Trelegy Ellipta 200-62.5-25 mcg/act inhaler  Generic drug: fluticasone-umeclidinium-vilanterol   Inhale 1 puff every day.  Dose: 1 Puff     vitamin D 2000 UNIT Tabs   Take 2,000 Units by mouth every day.  Dose: 2,000 Units     Xcopri 200 MG Tabs  Generic drug: Cenobamate   Doctor's comments: This is a change in dose. Patient increasing from 300 to 400 mg of Xcopri QHS. Please DC all other orders except this one.  for 90 days. Indications: Partial Onset Seizure  Take 2 tablets (400 mg) by mouth at bedtime  Dose: 400 mg            STOP taking these medications      Aptiom 200 MG Tabs  Generic drug: eslicarbazepine     lacosamide 100 MG Tabs tablet  Commonly known as: Vimpat              DISCHARGE DIAGNOSE(S):    Principal Problem:    Intractable focal epilepsy with impairment of consciousness (HCC) (Chronic) (POA: Yes)      Overview: Followed by Dr Galvan/[facility]      Taking Vimpat 300 mg QAM, 200 mg QPM;  Aptiom 1600mg QPM; recently added       Xcopri      He saw Dr Franklin to discuss VNS (vagal nerve stimilator); determined not       safe option with his sleep apnea. He met with Dr zambrano/[facility]       and declined CPAP for now. He was also referred to ENT,  pending appt.         Active Problems:    Sleep apnea (POA: Yes)    Elevated fasting glucose (POA: Yes)      Overview:  Latest Reference Range & Units 3/29/21 10:48 11/11/21 07:08       Glucose 65 - 99 mg/dL 137 (H) 107 (H)           Hypertrophy of uvula (POA: Yes)    Polypharmacy (POA: Yes)      Overview: Due to effects of multiple sedating AEDs. Attempts to wean meds in the       outpatient setting were unsuccessful due to breakthrough seizures       including prolonged focal to bilatera tonic clonic seizure. Admitted to       EMU October, 2023 for supervised withdrawal of antiseizure medicatons    Tremor due to multiple drugs (POA: Yes)    Vitamin D deficiency (POA: Yes)      Overview: Taking Vit D 2000 units daily  Resolved Problems:    * No resolved hospital problems. *       FOLLOW UP APPOINTMENTS:  Future Appointments         Provider Department Center    11/30/2023 11:20 AM (Arrive by 11:00 AM) [facility]     12/7/2023 7:40 AM (Arrive by 7:25 AM) Colleen Merlos P.A.-C[facility]             HPI, PRIOR WORK-UP, EXAM AS PER H&P FROM THIS ADMISSION:   Patient with intractable focal epilepsy (bilateral mesial temporal sclerosis; question of left mesial frontal onset due to stable mass on prior imaging) who presents to the epilepsy monitoring unit for presurgical work-up and to undergo a supervised medication titration/optimization.  Patient has been transitioning onto Xcopri and is  currently taking 400 mg nightly, the maximum dose.  Any attempts to wean him off one or more of his other medications have resulted in breakthrough seizures (focal unaware seizures and most recently a few weeks ago focal to bilateral tonic-clonic seizure prolonged lasting several minutes with several hours of postictal confusion).  Because of the difficulty of making medication changes in an outpatient basis and because of his recent prolonged focal to bilateral tonic-clonic seizure which poses a serious risk to his health, he warrants inpatient admission while medication adjustments are made with very close supervision.  Therefore, inpatient admission is a medical necessity as medications are optimized.      Patient feels more sedated since increasing Xcopri to 400 mg QHS and since adding back and increasing vimpat for further antiseizure protection. He has had only 1 focal to bilateral tonic clonic seizure lasting several minutes and with post-ictal confusion lasting hours > 2 month ago in the setting of medication weaning which prompted admission to the EMU this week. If patient is unable wean off his medications and/or continues to have seizures, then we will discuss getting updated MRI Brain w/wo contrast epilepsy protocol and referring to epilepsy level 4 center with consideration for phase II monitoring for epilepsy surgery. Since our last visit in August he has had a couple focal unaware seizures but no big FBLTC seizure.           Antiseizure regimen upon admisson:  Xcopri 400 mg QHD  Vimpat 100 mg BID  Aptiom 400 mg QHS  Clonazepam 0.25 mg QHS    DIAGNOSTICS  MRI Brain August 2015:  FINDINGS:     There is again seen extraaxial mass effect in the left   supraclinoid region.  There is again seen inferior impression on the left   hypothalamus and subfrontal region.  This mass appears to measure less   than 1 cm in craniocaudad dimension.  This also measures just over 1 cm   in transverse dimension.  This is  characterized by hypointense signal in   all sequences with no evidence of enhancement.  The overall size and   signal characteristics are stable from comparison.  There is some   uplifting of the left supraclinoid carotid artery as well as the middle   cerebral artery branches, stable from comparison. There is no evidence of   midline shift or focal edema.  No evidence of abnormal diffusion-weighted   signal.  The CSF spaces are otherwise normal in caliber.  No extraaxial   fluid collections are seen.  There is again noted increased signal within   the medial temporal lobes bilaterally which is stable from comparison.     There is normal flow void seen within the visualized intracranial   vascular structures.  Bilateral maxillary sinus disease is again seen.     Ethmoid and sphenoid sinus disease is seen as well.        Impression  IMPRESSION:     1. 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  THE OVERALL SIZE AND SIGNAL   CHARACTERISTICS APPEAR STABLE FROM COMPARISON AND DIFFERENTIAL REMAINS AS   PREVIOUSLY DISCUSSED.       2. INCREASED FLAIR SIGNAL SEEN WITHIN THE MESIAL TEMPORAL LOBES WHICH CAN   BE SEEN IN THE SETTING OF MESIAL TEMPORAL SCLEROSIS.  THIS IS UNCHANGED   FROM COMPARISON.       3. MAXILLARY, ETHMOID, AND SPHENOID SINUS DISEASE.     Most recent EEG:  March 2022 most recent:  INTERPRETATION:   Abnormal video EEG recording in the awake and drowsy state(s):  -Intermittent left frontotemporal theta>delta slowing, suggestive of focal dysfunction and/or structural abnormality in this region. Clinical and radiographic correlation recommended.  - No epileptiform discharges seen   - No seizures. Clinical correlation is recommended.     PHYSICAL EXAM:   Physical Exam  Constitutional:       General: He is not in acute distress.  HENT:      Head: Normocephalic.      Nose: Nose normal.      Mouth/Throat:      Pharynx: Uvula swelling present.       Comments: Enlarged tongue and marked enlarged and long uvula with minor swelling  Cardiovascular:      Rate and Rhythm: Normal rate.   Neurological:      Deep Tendon Reflexes:      Reflex Scores:       Tricep reflexes are 2+ on the right side and 2+ on the left side.       Bicep reflexes are 2+ on the right side and 2+ on the left side.       Brachioradialis reflexes are 2+ on the right side and 2+ on the left side.       Patellar reflexes are 3+ on the right side and 3+ on the left side.       Achilles reflexes are 2+ on the right side and 2+ on the left side.  Psychiatric:         Speech: Speech normal.         NEUROLOGICAL EXAM:   Neurological Exam  Mental Status   Oriented only to person, place, time and situation. Recalls 2 of 3 objects immediately. At 3 minutes recalls 2 of 3 objects. Recalls 1 of 3 objects with prompting. Speech is normal. Able to name objects. Follows complex commands. Digit span was 5 forward Fund of knowledge is appropriate for level of education.  Able to name months of years forward and backwards, complex sentence reprtiytion intact, abstract thinking and understanding intact. Speech and thinking is slower and more hesitant than usual.     Motor   No pronator drift.     Sensory  Light touch is normal in upper and lower extremities.      Reflexes                                            Right                      Left  Brachioradialis                    2+                         2+  Biceps                                 2+                         2+  Triceps                                2+                         2+  Patellar                                3+                         3+  Achilles                                2+                         2+     Coordination  Right: Finger-to-nose abnormality: Rapid alternating movement abnormality:Left: Finger-to-nose abnormality: Rapid alternating movement abnormality:  Mild dysmetria on finger-nose-finger.     Gait      Deferred.    HOSPITAL COURSE:  Patient with intractable focal epilepsy (bilateral mesial temporal sclerosis; question of left mesial frontal onset due to stable mass on prior imaging) who presents to the epilepsy monitoring unit for presurgical work-up and to undergo a supervised medication titration/optimization.  Patient has been transitioning onto Xcopri and is currently taking 400 mg nightly, the maximum dose.  Any attempts to wean him off one or more of his other medications have resulted in breakthrough seizures (focal unaware seizures and most recently a few weeks ago focal to bilateral tonic-clonic seizure prolonged lasting several minutes with several hours of postictal confusion).  Because of the difficulty of making medication changes in an outpatient basis and because of his recent prolonged focal to bilateral tonic-clonic seizure which poses a serious risk to his health, he warrants inpatient admission while medication adjustments are made with very close supervision.  Therefore, inpatient admission is a medical necessity as medications are optimized.        Day 1: Patient arrived on vimpat 100 mg BID. This was rapidly tapered off. He received 100 mg the morning of the admission. It was then decreased to 50 mg in the evening of day on and then discontinued altogether thereafter. Other medications remained the same. His EEG showed showed left temporal discharges as seen on prior discharges and suspicious slowing and spiky activity over the midline parietal and centro-parietal region. Nevertheless no seizures were seen. Patient tolerated rapid weaning of vimpat. He wants to proceed with the plan of coming off aptiom. Discussed possibility of adding Briviact if needed which will likely be better tolerated and avoid pharmacological redundancy that vimpat, aptiom, and Xcopri afford as sodium channel blockers.     Discussed surgical options again should medications fail to control epilepsy. Patient not  interested so I will defer further discussion.        Day 2: Patient had acute on chornic back pain for having to lie in bed all day and due to hospital bed. Adding toradol IV prn and tizanidine to help with pain and cramping. Otherwise no seizures. No auras. He is tolered medications tapering. Now off of vimpat.  Decrease aptiom this evening.      Day 3: Patient continues to do well with medication tapering. He reamins off vimpat and received a lower dose of Aptiom last night (200 mg) and will proceed with the plan of stopping it after last night. His EEG remains unchanged. No seizures. No increase in interictal activity.     Close supervised discontinuation/tapering of his antiseizure medications were done given his risk for injury, serious harm, or even death from convulsive seizures which we aimed to avoid. He therefore warranted admission to the EMU as we discontinued his vimpat and aptiom. Will add briviact if he needs additional antiseizure medication beyond the Xcopri as an outpatient. I have also written a tapering schedule for the clonazepam and sent to his pharmacy.        EEG SUMMARY:   Abnormal video EEG recording in the awake and drowsy/sleep state(s):  - Occasional to frequent sleep activated left anterior inferior temporal maximal ( F9T9>F7T3) sharp and slow wave discharges seen while awake, rarely occurring in brief periodic or quasi-periodic 1-1.5 Hz runs. These findings are suggestive of an increased risk for seizures arising from this region and are consistent with prior EEGs.  -rare to occasional bilateral right>left centro-parietal and midline parietal sharply contoured slowing, often shifting in laterality and occurring in brief 1-2 Hz qausi-rhythmic>rhythmic runs with overriding spiky beta and/or midline and left>right parietal maximal low voltage  spikes, at times suspiciously epileptiform . These findings suggest nonspecific focal dysfunction in this region as well as a potential  independent deep centro-parietal mesial epileptgoenic focus. Clinical and radiographic correlation recommended. Compared to prior EEGs, this findings is a new finding not previously seen or reported on and the additional inferior temporal electrodes added may account for this new finding. Further monitoring is needed to clarify its significance.   - No definitive seizures.   -No clinical events reported or recorded    EXAM ON DATE OF DISCHARGE:    Physical/Neurological Exam at discharge if different than Admission Exam: NA    Therefore,  patient is discharged in fair and stable condition to home with close outpatient follow-up.    The patient met 2-midnight criteria for an inpatient stay at the time of discharge.    CODE STATUS: Full Code    POST DISCHARGE DIET RECOMMENDATION: Regular Diet    POST-DISCHARGE ACTIVITY RECOMMENDATIONS:  As tolerated.  Weight bearing as tolerated    RECOMMENDATIONS FROM CONSULTANTS IF APPLICABLE: NA    PROCEDURES: Long-Term Video EEG    BILLING DOCUMENTATION:  Total time of the discharge process exceeded 65 minutes.    Fredy Franklin MD  Department of Neurology at [facility]  Diplomate of the American Board of Psychiatry and Neurology, General Neurology  Diplomate of American Board of Psychiatry and Neurology, a Member Board of the American Board of Medical Subspecialties, Epilepsy  Director of [facility]'s Level III Comprehensive Epilepsy Program  Professor of Clinical Neurology, [facility].   Number: [phone]  Fax: [phone]  E-mail: [email or web address]

## 2023-10-03 NOTE — PROGRESS NOTES
Monitor summary: SB-SR 57-84, NE 0.19, QRS 0.08, QT 0.36, with rare PVCs per strip from monitor room.

## 2023-10-04 PROCEDURE — G0316 PR PROLONGED IP/OBS E&M EA 15 MIN: HCPCS | Performed by: STUDENT IN AN ORGANIZED HEALTH CARE EDUCATION/TRAINING PROGRAM

## 2023-10-04 PROCEDURE — 700111 HCHG RX REV CODE 636 W/ 250 OVERRIDE (IP): Mod: JZ | Performed by: STUDENT IN AN ORGANIZED HEALTH CARE EDUCATION/TRAINING PROGRAM

## 2023-10-04 PROCEDURE — 95714 VEEG EA 12-26 HR UNMNTR: CPT | Performed by: STUDENT IN AN ORGANIZED HEALTH CARE EDUCATION/TRAINING PROGRAM

## 2023-10-04 PROCEDURE — 700102 HCHG RX REV CODE 250 W/ 637 OVERRIDE(OP): Performed by: STUDENT IN AN ORGANIZED HEALTH CARE EDUCATION/TRAINING PROGRAM

## 2023-10-04 PROCEDURE — 99233 SBSQ HOSP IP/OBS HIGH 50: CPT | Mod: 25 | Performed by: STUDENT IN AN ORGANIZED HEALTH CARE EDUCATION/TRAINING PROGRAM

## 2023-10-04 PROCEDURE — 770020 HCHG ROOM/CARE - TELE (206)

## 2023-10-04 PROCEDURE — 4A10X4Z MONITORING OF CENTRAL NERVOUS ELECTRICAL ACTIVITY, EXTERNAL APPROACH: ICD-10-PCS | Performed by: STUDENT IN AN ORGANIZED HEALTH CARE EDUCATION/TRAINING PROGRAM

## 2023-10-04 PROCEDURE — A9270 NON-COVERED ITEM OR SERVICE: HCPCS | Performed by: STUDENT IN AN ORGANIZED HEALTH CARE EDUCATION/TRAINING PROGRAM

## 2023-10-04 PROCEDURE — 95720 EEG PHY/QHP EA INCR W/VEEG: CPT | Performed by: STUDENT IN AN ORGANIZED HEALTH CARE EDUCATION/TRAINING PROGRAM

## 2023-10-04 RX ORDER — TIZANIDINE 4 MG/1
4 TABLET ORAL EVERY 6 HOURS PRN
Status: DISCONTINUED | OUTPATIENT
Start: 2023-10-04 | End: 2023-10-06 | Stop reason: HOSPADM

## 2023-10-04 RX ORDER — KETOROLAC TROMETHAMINE 30 MG/ML
30 INJECTION, SOLUTION INTRAMUSCULAR; INTRAVENOUS EVERY 6 HOURS PRN
Status: DISCONTINUED | OUTPATIENT
Start: 2023-10-04 | End: 2023-10-06 | Stop reason: HOSPADM

## 2023-10-04 RX ADMIN — ENOXAPARIN SODIUM 40 MG: 100 INJECTION SUBCUTANEOUS at 18:27

## 2023-10-04 RX ADMIN — CENOBAMATE 400 MG: 100 TABLET, FILM COATED ORAL at 20:20

## 2023-10-04 RX ADMIN — CLONAZEPAM 0.25 MG: 0.5 TABLET ORAL at 18:27

## 2023-10-04 RX ADMIN — FLUTICASONE FUROATE, UMECLIDINIUM BROMIDE AND VILANTEROL TRIFENATATE 1 PUFF: 200; 62.5; 25 POWDER RESPIRATORY (INHALATION) at 04:54

## 2023-10-04 RX ADMIN — KETOROLAC TROMETHAMINE 30 MG: 30 INJECTION, SOLUTION INTRAMUSCULAR at 20:19

## 2023-10-04 RX ADMIN — Medication 2000 UNITS: at 18:26

## 2023-10-04 RX ADMIN — DOCUSATE SODIUM 50 MG AND SENNOSIDES 8.6 MG 2 TABLET: 8.6; 5 TABLET, FILM COATED ORAL at 18:26

## 2023-10-04 RX ADMIN — ATORVASTATIN CALCIUM 20 MG: 20 TABLET, FILM COATED ORAL at 20:21

## 2023-10-04 RX ADMIN — CENOBAMATE 100 MG: 50 TABLET, FILM COATED ORAL at 00:19

## 2023-10-04 RX ADMIN — ESLICARBAZEPINE ACETATE 200 MG: 800 TABLET ORAL at 20:20

## 2023-10-04 ASSESSMENT — PAIN DESCRIPTION - PAIN TYPE
TYPE: ACUTE PAIN

## 2023-10-04 ASSESSMENT — FIBROSIS 4 INDEX: FIB4 SCORE: 1.15

## 2023-10-04 NOTE — CARE PLAN
The patient is Stable - Low risk of patient condition declining or worsening    Shift Goals  Clinical Goals: VEEG monitoring  Patient Goals: rest  Family Goals: JIMBO    Progress made toward(s) clinical / shift goals:  Patient being monitored throughout the night. Physician updated on low heart rate. Rest maintained throughout this shift.     Patient is   progressing towards the following goals:    Problem: Seizure EEG Monitoring  Goal: Appropriate Monitoring of EEG patient  Outcome: Progressing  Note: VEEG in place and patient monitored by medical staff. Education given on light for seizures.      Problem: Medication  Goal: Risk for seizure medication side effects will decrease  Outcome: Progressing  Note: Patient educated on medications and is able to state their use verbally.

## 2023-10-04 NOTE — PROCEDURES
VIDEO ELECTROENCEPHALOGRAM REPORT - EPILEPSY MONITORING UNIT (EMU)     REFERRING PROVIDER: Dr. Franklin  DOS: 10/04/23   ROOM: [facility]/   TOTAL RECORDING TIME: 23 hours and 17 minutes of total recording time    INDICATION:  Fredy Zavala 63 y.o. male presenting with  intractable focal epilepsy    RELEVANT MEDICATIONS/TREATMENTS:   cenobamate, 400 mg, QHS  eslicarbazepine, 200 mg, QHS  clonazePAM, 0.25 mg, Q EVENING         TECHNIQUE:   CVEEG was set up by a Neurodiagnostic technologist who performed education to the patient and staff. A minimum of 23 electrodes and 23 channel recording was setup and performed by Neurodiagnostic technologist, in accordance with the international 10-20 system. Impedence, electrode integrity, and technical impressions were documented a minimum of every 2-24 hour period by a Neurodiagnostic Technologist and reviewed by Interpreting Physician. The study was reviewed in bipolar and referential montages. The recording examined the patient in the awake and drowsy/sleep state(s).     DESCRIPTION OF THE RECORD:  During wakefulness, the background was continuous and showed a 9 Hz posterior dominant rhythm.  A normal anterior-posterior gradient was noted with faster beta frequencies seen anteriorly.  During drowsiness, theta/delta frequencies were seen.    Sleep was captured and was characterized by diffuse background delta/theta activity with a loss of myogenic artifact.  N2 sleep transients in the form of sleep spindles and vertex waves were seen in the leads over the central regions.     ICTAL AND INTERICTAL FINDINGS:   Occasional to frequent sleep activated left anterior inferior temporal maximal ( F9T9>F7T3) sharp and slow wave discharges seen while awake, rarely occurring in brief periodic or quasi-periodic 1-1.5 Hz runs     Rare to occasional bilateral right>left centro-parietal and midline parietal sharply contoured slowing, often shifting in laterality and occurring in brief 1-2 Hz  qausi-rhythmic>rhythmic runs with overriding spiky beta and/or midline and left>right parietal maximal low voltage  spikes, at times suspiciously epileptiform . T    No definitive seizures    ACTIVATION PROCEDURES:   NA    EKG: EKG was intermittently irriegular at times, at times bradycardic briefly in 30-40 BPM range.    EVENTS:  None    INTERPRETATION:  Abnormal video EEG recording in the awake and drowsy/sleep state(s):  - Occasional to frequent sleep activated left anterior inferior temporal maximal ( F9T9>F7T3) sharp and slow wave discharges seen while awake, rarely occurring in brief periodic or quasi-periodic 1-1.5 Hz runs. These findings are suggestive of an increased risk for seizures arising from this region and are consistent with prior EEGs.  -rare to occasional bilateral right>left centro-parietal and midline parietal sharply contoured slowing, often shifting in laterality and occurring in brief 1-2 Hz qausi-rhythmic>rhythmic runs with overriding spiky beta and/or midline and left>right parietal maximal low voltage  spikes, at times suspiciously epileptiform . These findings suggest nonspecific focal dysfunction in this region as well as a potential independent deep centro-parietal mesial epileptgoenic focus. Clinical and radiographic correlation recommended. Compared to prior EEGs, this findings is a new finding not previously seen or reported on and the additional inferior temporal electrodes added may account for this new finding. Further monitoring is needed to clarify its significance.   - No definitive seizures. Clinical correlation is recommended.  -No clinical events reported or recorded  -Compared to yesterday's study, this study is similar.      Fredy Franklin MD  Department of Neurology at [facility]  General Neurologist and Epileptologist  Director of [facility] of the [facility] Level III Comprehensive Epilepsy Program  Professor of Clinical Neurology, [facility]  [facility].   Phone: [phone]  Fax: [phone]  E-mail: [email or web address]

## 2023-10-04 NOTE — PROGRESS NOTES
NEUROLOGY EMU DAILY PROGRESS NOTE 10/4/2023     REFERRING PROVIDER: Dr. Franklin    REASON FOR ADMISSION: Reason for EMU Admission: Medication titration/optimization and Presurgical work-up. 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  Patient has been transitioning to Xcopri and is currently taking 400 mg nightly, the maximum dose.  Any attempts to wean him off one or more of his other medications have resulted in breakthrough seizures (focal unaware seizures and most recently a few weeks ago focal to bilateral tonic-clonic seizure prolonged lasting several minutes with several hours of postictal confusion).  Because of the difficulty of making medication changes in an outpatient basis and because of his recent prolonged focal to bilateral tonic-clonic seizure which poses a serious risk to his health, he warrants inpatient admission while medication adjustments are made with very close supervision.  Therefore, inpatient admission is a medical necessity as medications are optimized.     INTERVAL  HISTORY:   No overnight events    No seizures.    EEG without any push button events. No seizures on EEG. EEG  remains slightly improved compared to the first day as noted in the report. Patient is not off of vimpat. He has not noticed any changes to how he feels.    Did sleep poorly last night. Has acute on chronic back pain when I saw him today so he is having a hrard time getting comfortable.    Discussed plan to further decrease his medications - aptiom next from 400 mg to 200 mg QHS.        CURRENT INPATIENT MED LIST:   SCHEDULED  eslicarbazepine, 200 mg, QHS  cenobamate, 400 mg, QHS  enoxaparin (LOVENOX) injection, 40 mg, DAILY AT 1800  senna-docusate, 2 Tablet, BID  atorvastatin, 20  mg, Nightly  vitamin D3, 2,000 Units, DAILY AT 1800  fluticasone-umeclidinium-vilanterol, 1 Puff, DAILY  clonazePAM, 0.25 mg, Q EVENING    PRN  ketorolac, 30 mg, Q6HRS PRN  tizanidine, 4 mg, Q6HRS PRN  ibuprofen, 600 mg, Q6HRS PRN  LORazepam, 1 mg, Once PRN   Or  LORazepam, 2 mg, Once PRN  ondansetron, 4 mg, Q4HRS PRN   Or  ondansetron, 4 mg, Q4HRS PRN  polyethylene glycol/lytes, 1 Packet, QDAY PRN   And  magnesium hydroxide, 30 mL, QDAY PRN   And  bisacodyl, 10 mg, QDAY PRN  albuterol, 2 Puff, Q4HRS PRN               PRIOR WORK-UP TO DATE:    imaging  MRI Brain August 2015:  FINDINGS:     There is again seen extraaxial mass effect in the left   supraclinoid region.  There is again seen inferior impression on the left   hypothalamus and subfrontal region.  This mass appears to measure less   than 1 cm in craniocaudad dimension.  This also measures just over 1 cm   in transverse dimension.  This is characterized by hypointense signal in   all sequences with no evidence of enhancement.  The overall size and   signal characteristics are stable from comparison.  There is some   uplifting of the left supraclinoid carotid artery as well as the middle   cerebral artery branches, stable from comparison. There is no evidence of   midline shift or focal edema.  No evidence of abnormal diffusion-weighted   signal.  The CSF spaces are otherwise normal in caliber.  No extraaxial   fluid collections are seen.  There is again noted increased signal within   the medial temporal lobes bilaterally which is stable from comparison.     There is normal flow void seen within the visualized intracranial   vascular structures.  Bilateral maxillary sinus disease is again seen.     Ethmoid and sphenoid sinus disease is seen as well.        Impression  IMPRESSION:     1. 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  THE OVERALL SIZE AND SIGNAL   CHARACTERISTICS APPEAR STABLE  FROM COMPARISON AND DIFFERENTIAL REMAINS AS   PREVIOUSLY DISCUSSED.       2. INCREASED FLAIR SIGNAL SEEN WITHIN THE MESIAL TEMPORAL LOBES WHICH CAN   BE SEEN IN THE SETTING OF MESIAL TEMPORAL SCLEROSIS.  THIS IS UNCHANGED   FROM COMPARISON.       3. MAXILLARY, ETHMOID, AND SPHENOID SINUS DISEASE.   prior eegs:  March 2022 most recent:  INTERPRETATION:   Abnormal video EEG recording in the awake and drowsy state(s):  -Intermittent left frontotemporal theta>delta slowing, suggestive of focal dysfunction and/or structural abnormality in this region. Clinical and radiographic correlation recommended.  - No epileptiform discharges seen   - No seizures. Clinical correlation is recommended.      PHYSICAL EXAM:   /64   Pulse 63   Temp 37 °C (98.6 °F) (Temporal)   Resp 17   Wt 67.6 kg (149 lb 0.5 oz)   SpO2 97%     Physical Exam  Constitutional:       General: He is not in acute distress.  HENT:      Head: Normocephalic.      Nose: Nose normal.      Mouth/Throat:      Pharynx: Uvula swelling present.      Comments: Enlarged tongue and marked enlarged and long uvula with minor swelling  Cardiovascular:      Rate and Rhythm: Normal rate.   Neurological:      Deep Tendon Reflexes:      Reflex Scores:       Tricep reflexes are 2+ on the right side and 2+ on the left side.       Bicep reflexes are 2+ on the right side and 2+ on the left side.       Brachioradialis reflexes are 2+ on the right side and 2+ on the left side.       Patellar reflexes are 3+ on the right side and 3+ on the left side.       Achilles reflexes are 2+ on the right side and 2+ on the left side.  Psychiatric:         Speech: Speech normal.        NEUROLOGICAL EXAM:   Neurological Exam  Mental Status   Oriented only to person, place, time and situation. Recalls 2 of 3 objects immediately. At 3 minutes recalls 2 of 3 objects. Recalls 1 of 3 objects with prompting. Speech is normal. Able to name objects. Follows complex commands. Digit span was 5  forward Fund of knowledge is appropriate for level of education.  Able to name months of years forward and backwards, complex sentence reprtiytion intact, abstract thinking and understanding intact. Speech and thinking is slower and more hesitant than usual.    Motor   No pronator drift.    Sensory  Light touch is normal in upper and lower extremities.     Reflexes                                            Right                      Left  Brachioradialis                    2+                         2+  Biceps                                 2+                         2+  Triceps                                2+                         2+  Patellar                                3+                         3+  Achilles                                2+                         2+    Coordination  Right: Finger-to-nose abnormality: Rapid alternating movement abnormality:Left: Finger-to-nose abnormality: Rapid alternating movement abnormality:  Mild dysmetria on finger-nose-finger.    Gait    Deferred.       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  Patient has been transitioning onto Xcopri and is currently taking 400 mg nightly, the maximum dose.  Any attempts to wean him off one or more of his other medications have resulted in breakthrough seizures (focal unaware seizures and most recently a few weeks ago focal to bilateral tonic-clonic seizure prolonged lasting several minutes with several hours of postictal confusion).  Because of the difficulty of making medication changes in an outpatient basis and because of his recent prolonged focal to bilateral tonic-clonic seizure which poses a serious risk to his health, he warrants  inpatient admission while medication adjustments are made with very close supervision.  Therefore, inpatient admission is a medical necessity as medications are optimized.        Day 1: Patient arrived on vimpat 100 mg BID. This was rapidly tapered off. He received 100 mg the morning of the admission. It was then decreased to 50 mg in the evening of day on and then discontinued altogether thereafter. Other medications remained the same. His EEG showed showed left temporal discharges as seen on prior discharges and suspicious slowing and spiky activity over the midline parietal and centro-parietal region. Nevertheless no seizures were seen. Patient tolerated rapid weaning of vimpat. He wants to proceed with the plan of coming off aptiom. Discussed possibility of adding Briviact if needed which will likely be better tolerated and avoid pharmacological redundancy that vimpat, aptiom, and Xcopri afford as sodium channel blockers.    Discussed surgical options again should medications fail to control epilepsy. Patient not interested so I will defer further discussion.       Day 2: Patient has acute on chornic back pain for having to lie in bed all day and due to hospital bed. Adding toradol IV prn and tizanidine to help with pain and cramping. Otherwise no seizures. No auras. He is tolerating medications tapering. Now off of vimpat. Ill decrease aptiom tonight. Will add brivact if needed but with hold on making this change for now.     Will continue close supervised discontinuation/tapering of his antiseizure medications given his risk for injury, serious harm, or even death from convulsive seizures which we aim to avoid. He therefore warrants ongoing admission to the EMU as we decrease aptiom from 400 to 200 mg QHS. Will add briviact if he needs addition antiseizure medication beyond the Xcopri.    PLAN:    Intractable focal epilepsy  Semiology  Focal unaware seizures with staring off, unresponsiveness, lip smacking,  hand automatisms  Focal to bilateral tonic clonic seizure with with prolonged convulsions  Continuous VEEG monitoring  Vitals and neurological checks as ordered  Telemetry  Other diagnostics if applicable: NA  HV and PS to be performed on Day 1 of admission and at the discretion of the attending epileptologist on subsequent days  Rescue Ativan Protocol ordered  Sleep deprivation: No  Active antiseizure regimen:  Xcopri 400 mg QHD  Vimpat 100 mg in AM of day 1/decreased to 50 mg PM of day 2 then stopped thereafter  Aptiom 400 mg QHS-> decrease to 200 mg QHS evening of Day 3  Clonazepam 0.25 mg QHS  Will consider getting updated MRI Brain as an outpatient  Consider referral to epilepsy level 4 center for consideration for phase II monitoring  Will add briviact as needed. Will load with briviact in addition to ativan IV for any convulsions or prolonged seizure    Acute on Chronic Back pain  -IV toradol 30 mg Q6 hours added   -prn tizaidine added for cramps/spasms      Polypharmacy  -tapering antiseizure medications under supervision as detailed above    OTHER ITEMS:  DVT Ppx: Lovenox and/or SCDs  DIET: Regular  Bowel regimen  PRN analgesics available for pain  PRN antiemetics available    CODE STATUS:   FULL CODE    BILLING DOCUMENTATION:     I spent a total of 70 minutes of face-to-face time in this visit. Over 50% of the time of the visit today was spent on counseling and/or coordination of care wtih the patient and/or family, as above in assessment in plan.    Fredy Franklin MD  Department of Neurology at [facility]  General Neurologist and Epileptologist  Director of [facility] Level III Comprehensive Epilepsy Program  Professor of Clinical Neurology, [facility].   Phone: [phone]  Fax: [phone]  E-mail: [email or web address]

## 2023-10-04 NOTE — CARE PLAN
Problem: Knowledge Deficit - Standard  Goal: Patient and family/care givers will demonstrate understanding of plan of care, disease process/condition, diagnostic tests and medications  Description: Target End Date:  1-3 days or as soon as patient condition allows    Document in Patient Education    1.  Patient and family/caregiver oriented to unit, equipment, visitation policy and means for communicating concern  2.  Complete/review Learning Assessment  3.  Assess knowledge level of disease process/condition, treatment plan, diagnostic tests and medications  4.  Explain disease process/condition, treatment plan, diagnostic tests and medications  Outcome: Progressing   The patient is Stable - Low risk of patient condition declining or worsening    Shift Goals  Clinical Goals: monitor sz  Patient Goals: rest  Family Goals: JIMBO    Progress made toward(s) clinical / shift goals:      Patient is not progressing towards the following goals:

## 2023-10-05 PROBLEM — Z79.899 POLYPHARMACY: Status: ACTIVE | Noted: 2023-10-05

## 2023-10-05 PROCEDURE — 95720 EEG PHY/QHP EA INCR W/VEEG: CPT | Performed by: STUDENT IN AN ORGANIZED HEALTH CARE EDUCATION/TRAINING PROGRAM

## 2023-10-05 PROCEDURE — 4A10X4Z MONITORING OF CENTRAL NERVOUS ELECTRICAL ACTIVITY, EXTERNAL APPROACH: ICD-10-PCS | Performed by: STUDENT IN AN ORGANIZED HEALTH CARE EDUCATION/TRAINING PROGRAM

## 2023-10-05 PROCEDURE — 770020 HCHG ROOM/CARE - TELE (206)

## 2023-10-05 PROCEDURE — A9270 NON-COVERED ITEM OR SERVICE: HCPCS | Performed by: STUDENT IN AN ORGANIZED HEALTH CARE EDUCATION/TRAINING PROGRAM

## 2023-10-05 PROCEDURE — 700102 HCHG RX REV CODE 250 W/ 637 OVERRIDE(OP): Performed by: STUDENT IN AN ORGANIZED HEALTH CARE EDUCATION/TRAINING PROGRAM

## 2023-10-05 PROCEDURE — 95714 VEEG EA 12-26 HR UNMNTR: CPT | Performed by: STUDENT IN AN ORGANIZED HEALTH CARE EDUCATION/TRAINING PROGRAM

## 2023-10-05 PROCEDURE — 700111 HCHG RX REV CODE 636 W/ 250 OVERRIDE (IP): Mod: JZ | Performed by: STUDENT IN AN ORGANIZED HEALTH CARE EDUCATION/TRAINING PROGRAM

## 2023-10-05 PROCEDURE — 99233 SBSQ HOSP IP/OBS HIGH 50: CPT | Mod: 25 | Performed by: STUDENT IN AN ORGANIZED HEALTH CARE EDUCATION/TRAINING PROGRAM

## 2023-10-05 RX ADMIN — FLUTICASONE FUROATE, UMECLIDINIUM BROMIDE AND VILANTEROL TRIFENATATE 1 PUFF: 200; 62.5; 25 POWDER RESPIRATORY (INHALATION) at 05:17

## 2023-10-05 RX ADMIN — ENOXAPARIN SODIUM 40 MG: 100 INJECTION SUBCUTANEOUS at 17:03

## 2023-10-05 RX ADMIN — CLONAZEPAM 0.25 MG: 0.5 TABLET ORAL at 17:03

## 2023-10-05 RX ADMIN — Medication 2000 UNITS: at 17:03

## 2023-10-05 RX ADMIN — ATORVASTATIN CALCIUM 20 MG: 20 TABLET, FILM COATED ORAL at 19:08

## 2023-10-05 RX ADMIN — CENOBAMATE 400 MG: 100 TABLET, FILM COATED ORAL at 19:08

## 2023-10-05 ASSESSMENT — PAIN DESCRIPTION - PAIN TYPE: TYPE: ACUTE PAIN

## 2023-10-05 ASSESSMENT — FIBROSIS 4 INDEX: FIB4 SCORE: 1.15

## 2023-10-05 NOTE — CARE PLAN
Problem: Seizure EEG Monitoring  Goal: Appropriate Monitoring of EEG patient  Description: Target End Date:  Prior to discharge or change in level of care    1.  Ensure patient in view of camera at all times, do not block camera, curtains are pulled, door remains open  2.  May be off camera for bathroom privileges with assistance from staff, do not leave patient in bathroom unattended  3.  Face to face evaluation every 10 minutes or continuous video monitoring by sitter  4.  Review order for remote cardiac monitoring  5.  Press event button, observe and document events  Outcome: Progressing   The patient is Stable - Low risk of patient condition declining or worsening    Shift Goals  Clinical Goals: sz activity  Patient Goals: rest  Family Goals: JIMBO    Progress made toward(s) clinical / shift goals:      Patient is not progressing towards the following goals:

## 2023-10-05 NOTE — PROCEDURES
VIDEO ELECTROENCEPHALOGRAM REPORT - EPILEPSY MONITORING UNIT (EMU)     REFERRING PROVIDER: Dr. Franklin  DOS: 10/05/23   ROOM: [place] [id number]   TOTAL RECORDING TIME: 26 hours and 00 minutes of total recording time    INDICATION:  Fredy Zavala 63 y.o. male presenting with  intractable focal epilepsy    RELEVANT MEDICATIONS/TREATMENTS:   cenobamate, 400 mg, QHS  clonazePAM, 0.25 mg, Q EVENING         TECHNIQUE:   CVEEG was set up by a Neurodiagnostic technologist who performed education to the patient and staff. A minimum of 23 electrodes and 23 channel recording was setup and performed by Neurodiagnostic technologist, in accordance with the international 10-20 system. Impedence, electrode integrity, and technical impressions were documented a minimum of every 2-24 hour period by a Neurodiagnostic Technologist and reviewed by Interpreting Physician. The study was reviewed in bipolar and referential montages. The recording examined the patient in the awake and drowsy/sleep state(s).     DESCRIPTION OF THE RECORD:  During wakefulness, the background was continuous and showed a 9 Hz posterior dominant rhythm.  A normal anterior-posterior gradient was noted with faster beta frequencies seen anteriorly.  During drowsiness, theta/delta frequencies were seen.    Sleep was captured and was characterized by diffuse background delta/theta activity with a loss of myogenic artifact.  N2 sleep transients in the form of sleep spindles and vertex waves were seen in the leads over the central regions.     ICTAL AND INTERICTAL FINDINGS:   Occasional to frequent sleep activated left anterior inferior temporal maximal ( F9T9>F7T3) sharp and slow wave discharges seen while awake, rarely occurring in brief periodic or quasi-periodic 1-1.5 Hz runs     Rare to occasional bilateral right>left centro-parietal and midline parietal sharply contoured slowing, often shifting in laterality and occurring in brief 1-2 Hz qausi-rhythmic>rhythmic runs  with overriding spiky beta and/or midline and left>right parietal maximal low voltage  spikes, at times suspiciously epileptiform . T    No definitive seizures    ACTIVATION PROCEDURES:   NA    EKG: EKG was intermittently irriegular at times, at times bradycardic briefly in 30-40 BPM range.    EVENTS:  None    INTERPRETATION:  Abnormal video EEG recording in the awake and drowsy/sleep state(s):  - Occasional to frequent sleep activated left anterior inferior temporal maximal ( F9T9>F7T3) sharp and slow wave discharges seen while awake, rarely occurring in brief periodic or quasi-periodic 1-1.5 Hz runs. These findings are suggestive of an increased risk for seizures arising from this region and are consistent with prior EEGs.  -rare to occasional bilateral right>left centro-parietal and midline parietal sharply contoured slowing, often shifting in laterality and occurring in brief 1-2 Hz qausi-rhythmic>rhythmic runs with overriding spiky beta and/or midline and left>right parietal maximal low voltage  spikes, at times suspiciously epileptiform . These findings suggest nonspecific focal dysfunction in this region as well as a potential independent deep centro-parietal mesial epileptgoenic focus. Clinical and radiographic correlation recommended. Compared to prior EEGs, this findings is a new finding not previously seen or reported on and the additional inferior temporal electrodes added may account for this new finding. Further monitoring is needed to clarify its significance.   - No definitive seizures. Clinical correlation is recommended.  -No clinical events reported or recorded  -Compared to yesterday's study, no major differences were seen      Fredy Franklin MD  Department of Neurology at [facility]  General Neurologist and Epileptologist  Director of [facility] Level III Comprehensive Epilepsy Program  Professor of Clinical Neurology, [facility].    Phone: [phone]  Fax: [phone]  E-mail: [email or web address]

## 2023-10-05 NOTE — PROGRESS NOTES
Monitor summary: SR, HR 64-69, TX 0.17, QRS 0.04, QT 0.34 with (O)PACs per strip from monitor room

## 2023-10-05 NOTE — CARE PLAN
The patient is Stable - Low risk of patient condition declining or worsening    Shift Goals  Clinical Goals: Monitor for seizures  Patient Goals: Rest  Family Goals: JIMBO    Progress made toward(s) clinical / shift goals:      Problem: Seizure Precautions  Goal: Implementation of seizure precautions  Outcome: Progressing     Problem: Pain - Standard  Goal: Alleviation of pain or a reduction in pain to the patient’s comfort goal  Outcome: Progressing  Note: Pt medicated per MAR for pain control.      Problem: Knowledge Deficit - Standard  Goal: Patient and family/care givers will demonstrate understanding of plan of care, disease process/condition, diagnostic tests and medications  Outcome: Progressing       Patient is not progressing towards the following goals:

## 2023-10-06 ENCOUNTER — PHARMACY VISIT (OUTPATIENT)
Dept: PHARMACY | Facility: MEDICAL CENTER | Age: 63
End: 2023-10-06
Payer: COMMERCIAL

## 2023-10-06 VITALS
OXYGEN SATURATION: 95 % | SYSTOLIC BLOOD PRESSURE: 114 MMHG | DIASTOLIC BLOOD PRESSURE: 66 MMHG | HEART RATE: 59 BPM | TEMPERATURE: 97.5 F | RESPIRATION RATE: 18 BRPM | BODY MASS INDEX: 25.14 KG/M2 | WEIGHT: 165.34 LBS

## 2023-10-06 PROCEDURE — RXMED WILLOW AMBULATORY MEDICATION CHARGE: Performed by: STUDENT IN AN ORGANIZED HEALTH CARE EDUCATION/TRAINING PROGRAM

## 2023-10-06 PROCEDURE — 95711 VEEG 2-12 HR UNMONITORED: CPT | Performed by: STUDENT IN AN ORGANIZED HEALTH CARE EDUCATION/TRAINING PROGRAM

## 2023-10-06 PROCEDURE — 99239 HOSP IP/OBS DSCHRG MGMT >30: CPT | Performed by: STUDENT IN AN ORGANIZED HEALTH CARE EDUCATION/TRAINING PROGRAM

## 2023-10-06 PROCEDURE — 4A10X4Z MONITORING OF CENTRAL NERVOUS ELECTRICAL ACTIVITY, EXTERNAL APPROACH: ICD-10-PCS | Performed by: STUDENT IN AN ORGANIZED HEALTH CARE EDUCATION/TRAINING PROGRAM

## 2023-10-06 RX ORDER — CLONAZEPAM 0.25 MG/1
TABLET, ORALLY DISINTEGRATING ORAL
Qty: 11 TABLET | Refills: 0 | Status: SHIPPED | OUTPATIENT
Start: 2023-10-06 | End: 2023-11-03

## 2023-10-06 RX ADMIN — FLUTICASONE FUROATE, UMECLIDINIUM BROMIDE AND VILANTEROL TRIFENATATE 1 PUFF: 200; 62.5; 25 POWDER RESPIRATORY (INHALATION) at 04:50

## 2023-10-06 ASSESSMENT — PAIN DESCRIPTION - PAIN TYPE: TYPE: ACUTE PAIN

## 2023-10-06 ASSESSMENT — FIBROSIS 4 INDEX: FIB4 SCORE: 1.15

## 2023-10-06 NOTE — PROGRESS NOTES
Pt arrived to the [facility] without a PIV. Armband removed. Discussed dc instructions including medications, and follow-up appointments. Meds to beds delivered.

## 2023-10-06 NOTE — CARE PLAN
The patient is Stable - Low risk of patient condition declining or worsening    Shift Goals  Clinical Goals: Monitor for seizures  Patient Goals: Rest  Family Goals: JIMBO    Progress made toward(s) clinical / shift goals:        Problem: Seizure EEG Monitoring  Goal: Appropriate Monitoring of EEG patient  Outcome: Progressing      Problem: Pain - Standard  Goal: Alleviation of pain or a reduction in pain to the patient’s comfort goal  Outcome: Progressing  Note: Pt medicated per MAR for pain control.       Problem: Knowledge Deficit - Standard  Goal: Patient and family/care givers will demonstrate understanding of plan of care, disease process/condition, diagnostic tests and medications  Outcome: Progressing          Patient is not progressing towards the following goals:

## 2023-10-06 NOTE — PROGRESS NOTES
NEUROLOGY EMU DAILY PROGRESS NOTE 10/5/2023     REFERRING PROVIDER: Dr. Franklin    REASON FOR ADMISSION: Reason for EMU Admission: Medication titration/optimization and Presurgical work-up. 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  Patient has been transitioning to Xcopri and is currently taking 400 mg nightly, the maximum dose.  Any attempts to wean him off one or more of his other medications have resulted in breakthrough seizures (focal unaware seizures and most recently a few weeks ago focal to bilateral tonic-clonic seizure prolonged lasting several minutes with several hours of postictal confusion).  Because of the difficulty of making medication changes in an outpatient basis and because of his recent prolonged focal to bilateral tonic-clonic seizure which poses a serious risk to his health, he warrants inpatient admission while medication adjustments are made with very close supervision.  Therefore, inpatient admission is a medical necessity as medications are optimized.     INTERVAL  HISTORY:   No overnight events    No seizures.    EEG without any push button events. No seizures on EEG. No major changes on EEG compared to the prior one. Patient received 200 mg of Aptiom last night and now it has been discontinued.    Back pain has improved with toradol yesterday evening. No complaints this morning.    Patient still does not feel any different. His wife does notice a difference in his mental clarity with the reduction in medications.        CURRENT INPATIENT MED LIST:   SCHEDULED  cenobamate, 400 mg, QHS  enoxaparin (LOVENOX) injection, 40 mg, DAILY AT 1800  senna-docusate, 2 Tablet, BID  atorvastatin, 20 mg, Nightly  vitamin D3, 2,000 Units, DAILY AT  1800  fluticasone-umeclidinium-vilanterol, 1 Puff, DAILY  clonazePAM, 0.25 mg, Q EVENING    PRN  ketorolac, 30 mg, Q6HRS PRN  tizanidine, 4 mg, Q6HRS PRN  ibuprofen, 600 mg, Q6HRS PRN  LORazepam, 1 mg, Once PRN   Or  LORazepam, 2 mg, Once PRN  ondansetron, 4 mg, Q4HRS PRN   Or  ondansetron, 4 mg, Q4HRS PRN  polyethylene glycol/lytes, 1 Packet, QDAY PRN   And  magnesium hydroxide, 30 mL, QDAY PRN   And  bisacodyl, 10 mg, QDAY PRN  albuterol, 2 Puff, Q4HRS PRN               PRIOR WORK-UP TO DATE:    imaging  MRI Brain August 2015:  FINDINGS:     There is again seen extraaxial mass effect in the left   supraclinoid region.  There is again seen inferior impression on the left   hypothalamus and subfrontal region.  This mass appears to measure less   than 1 cm in craniocaudad dimension.  This also measures just over 1 cm   in transverse dimension.  This is characterized by hypointense signal in   all sequences with no evidence of enhancement.  The overall size and   signal characteristics are stable from comparison.  There is some   uplifting of the left supraclinoid carotid artery as well as the middle   cerebral artery branches, stable from comparison. There is no evidence of   midline shift or focal edema.  No evidence of abnormal diffusion-weighted   signal.  The CSF spaces are otherwise normal in caliber.  No extraaxial   fluid collections are seen.  There is again noted increased signal within   the medial temporal lobes bilaterally which is stable from comparison.     There is normal flow void seen within the visualized intracranial   vascular structures.  Bilateral maxillary sinus disease is again seen.     Ethmoid and sphenoid sinus disease is seen as well.        Impression  IMPRESSION:     1. 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  THE OVERALL SIZE AND SIGNAL   CHARACTERISTICS APPEAR STABLE FROM COMPARISON AND DIFFERENTIAL REMAINS AS    PREVIOUSLY DISCUSSED.       2. INCREASED FLAIR SIGNAL SEEN WITHIN THE MESIAL TEMPORAL LOBES WHICH CAN   BE SEEN IN THE SETTING OF MESIAL TEMPORAL SCLEROSIS.  THIS IS UNCHANGED   FROM COMPARISON.       3. MAXILLARY, ETHMOID, AND SPHENOID SINUS DISEASE.   prior eegs:  March 2022 most recent:  INTERPRETATION:   Abnormal video EEG recording in the awake and drowsy state(s):  -Intermittent left frontotemporal theta>delta slowing, suggestive of focal dysfunction and/or structural abnormality in this region. Clinical and radiographic correlation recommended.  - No epileptiform discharges seen   - No seizures. Clinical correlation is recommended.      PHYSICAL EXAM:   /57   Pulse (!) 56   Temp 36.9 °C (98.4 °F) (Temporal)   Resp 18   Wt 67.6 kg (149 lb 0.5 oz)   SpO2 95%     Physical Exam  Constitutional:       General: He is not in acute distress.  HENT:      Head: Normocephalic.      Nose: Nose normal.      Mouth/Throat:      Pharynx: Uvula swelling present.      Comments: Enlarged tongue and marked enlarged and long uvula with minor swelling  Cardiovascular:      Rate and Rhythm: Normal rate.   Psychiatric:         Speech: Speech normal.        NEUROLOGICAL EXAM:   Neurological Exam  Mental Status   Oriented only to person, place, time and situation. Recalls 2 of 3 objects immediately. At 3 minutes recalls 2 of 3 objects. Recalls 1 of 3 objects with prompting. Speech is normal. Able to name objects. Follows complex commands. Digit span was 5 forward Fund of knowledge is appropriate for level of education.  Able to name months of years forward and backwards, complex sentence reprtiytion intact, abstract thinking and understanding intact. Speech and thinking is slower and more hesitant than usual.    Motor   No pronator drift.    Sensory  Light touch is normal in upper and lower extremities.     Coordination  Right: Finger-to-nose abnormality: Rapid alternating movement abnormality:Left: Finger-to-nose  abnormality: Rapid alternating movement abnormality:  Mild dysmetria on finger-nose-finger.    Gait    Deferred.       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  Patient has been transitioning onto Xcopri and is currently taking 400 mg nightly, the maximum dose.  Any attempts to wean him off one or more of his other medications have resulted in breakthrough seizures (focal unaware seizures and most recently a few weeks ago focal to bilateral tonic-clonic seizure prolonged lasting several minutes with several hours of postictal confusion).  Because of the difficulty of making medication changes in an outpatient basis and because of his recent prolonged focal to bilateral tonic-clonic seizure which poses a serious risk to his health, he warrants inpatient admission while medication adjustments are made with very close supervision.  Therefore, inpatient admission is a medical necessity as medications are optimized.        Day 1: Patient arrived on vimpat 100 mg BID. This was rapidly tapered off. He received 100 mg the morning of the admission. It was then decreased to 50 mg in the evening of day on and then discontinued altogether thereafter. Other medications remained the same. His EEG showed showed left temporal discharges as seen on prior discharges and suspicious slowing and spiky activity over the midline parietal and centro-parietal region. Nevertheless no seizures were seen. Patient tolerated rapid weaning of vimpat. He wants to proceed with the plan of coming off aptiom. Discussed possibility of adding Briviact if needed which will likely be better tolerated and avoid pharmacological redundancy that vimpat, aptiom, and Xcopri  afford as sodium channel blockers.    Discussed surgical options again should medications fail to control epilepsy. Patient not interested so I will defer further discussion.       Day 2: Patient has acute on chornic back pain for having to lie in bed all day and due to hospital bed. Adding toradol IV prn and tizanidine to help with pain and cramping. Otherwise no seizures. No auras. He is tolerating medications tapering. Now off of vimpat. Ill decrease aptiom tonight. Will add brivact if needed but with hold on making this change for now.     Day 3: Patient continues to do well with medication tapering. He reamins off vimpat and received a lower dose of Aptiom last night (200 mg) and will proceed with the plan of stopping it after last night. His EEG remains unchanged. No seizures. No increase in interictal activity.    Will continue close supervised discontinuation/tapering of his antiseizure medications given his risk for injury, serious harm, or even death from convulsive seizures which we aim to avoid. He therefore warrants ongoing admission to the EMU as we stop his Aptiom tonight. Will add briviact if he needs additional antiseizure medication beyond the Xcopri. Will plan for discharge tomorrow morning around 10 AM if there remains no major issues and if his clinical trajectory is maintained.    PLAN:    Intractable focal epilepsy  Semiology  Focal unaware seizures with staring off, unresponsiveness, lip smacking, hand automatisms  Focal to bilateral tonic clonic seizure with with prolonged convulsions  Continuous VEEG monitoring  Vitals and neurological checks as ordered  Telemetry  Other diagnostics if applicable: NA  HV and PS to be performed on Day 1 of admission and at the discretion of the attending epileptologist on subsequent days  Rescue Ativan Protocol ordered  Sleep deprivation: No  Active antiseizure regimen:  Xcopri 400 mg QHD  Vimpat 100 mg in AM of day 1/decreased to 50 mg PM of day 2 then  stopped thereafter  Aptiom 400 mg QHS-> decrease to 200 mg QHS evening of Day 3 and stopping thereafter  Clonazepam 0.25 mg QHS-> will prescribed a 4 week tapering plan upon discharge  Will consider getting updated MRI Brain as an outpatient  Consider referral to epilepsy level 4 center for consideration for phase II monitoring  Will add briviact as needed. Will load with briviact in addition to ativan IV for any convulsions or prolonged seizure    Acute on Chronic Back pain (resolved with prn toradol)  -IV toradol 30 mg Q6 hours added   -prn tizaidine added for cramps/spasms      Polypharmacy (improving)  -tapering antiseizure medications under supervision as detailed above    OTHER ITEMS:  DVT Ppx: Lovenox and/or SCDs  DIET: Regular  Bowel regimen  PRN analgesics available for pain  PRN antiemetics available    CODE STATUS:   FULL CODE    BILLING DOCUMENTATION:     I spent a total of 55 minutes of face-to-face time in this visit. Over 50% of the time of the visit today was spent on counseling and/or coordination of care wtih the patient and/or family, as above in assessment in plan.    Fredy Franklin MD  Department of Neurology at [facility]  General Neurologist and Epileptologist  Director of [facility]'s Level III Comprehensive Epilepsy Program  Professor of Clinical Neurology, [facility].   Phone: [phone]  Fax: [phone]  E-mail: [email or web address]

## 2023-10-09 ENCOUNTER — TELEPHONE (OUTPATIENT)
Dept: PHARMACY | Facility: MEDICAL CENTER | Age: 63
End: 2023-10-09
Payer: MEDICARE

## 2023-10-09 PROCEDURE — RXMED WILLOW AMBULATORY MEDICATION CHARGE: Performed by: STUDENT IN AN ORGANIZED HEALTH CARE EDUCATION/TRAINING PROGRAM

## 2023-10-10 ENCOUNTER — PHARMACY VISIT (OUTPATIENT)
Dept: PHARMACY | Facility: MEDICAL CENTER | Age: 63
End: 2023-10-10
Payer: COMMERCIAL

## 2023-10-10 PROCEDURE — RXMED WILLOW AMBULATORY MEDICATION CHARGE: Performed by: NURSE PRACTITIONER

## 2023-10-10 NOTE — TELEPHONE ENCOUNTER
Contact:      Phone number: [phone], Mobile    Name of person spoken with and relationship to patient: Jovita Zavala, Spouse  Patient’s Adherence:      How patient is doing on medication: Good    How many missed doses and reason: 0    Any new medications: No    Any new conditions: No    Any new allergies: No    Any new side effects: No    Any new diagnoses: No    How many doses remainin tablets    Did patient want to speak with pharmacist: No  Delivery:      Delivery date and method: 10/11  at the pharmacy    Needs by Date:     Signature required: Yes    Any additional details for : N/A   Teach Appointment Date: N/A  Shipping Address: [address] [place] [address]  Medication (name, strength and dose): Xcopri 200mg tabs-2 QD  Copay: $20/30ds  Payment Method: [employer]   Supplies: None  Additional Information: Pt was admitted to the ED last Friday and Aptiom & Lacosamide was discontinued. Jovita reported that they also lowered the dose of Clonazepam and will be titrating off of the medication. Next call date: 11/15.

## 2023-10-17 ENCOUNTER — APPOINTMENT (RX ONLY)
Dept: URBAN - METROPOLITAN AREA CLINIC 15 | Facility: CLINIC | Age: 63
Setting detail: DERMATOLOGY
End: 2023-10-17

## 2023-10-17 DIAGNOSIS — L82.1 OTHER SEBORRHEIC KERATOSIS: ICD-10-CM

## 2023-10-17 DIAGNOSIS — D18.0 HEMANGIOMA: ICD-10-CM

## 2023-10-17 DIAGNOSIS — L57.0 ACTINIC KERATOSIS: ICD-10-CM | Status: INADEQUATELY CONTROLLED

## 2023-10-17 DIAGNOSIS — B35.1 TINEA UNGUIUM: ICD-10-CM | Status: INADEQUATELY CONTROLLED

## 2023-10-17 DIAGNOSIS — L81.4 OTHER MELANIN HYPERPIGMENTATION: ICD-10-CM

## 2023-10-17 DIAGNOSIS — L82.0 INFLAMED SEBORRHEIC KERATOSIS: ICD-10-CM

## 2023-10-17 DIAGNOSIS — D22 MELANOCYTIC NEVI: ICD-10-CM

## 2023-10-17 DIAGNOSIS — Z71.89 OTHER SPECIFIED COUNSELING: ICD-10-CM

## 2023-10-17 DIAGNOSIS — L85.3 XEROSIS CUTIS: ICD-10-CM

## 2023-10-17 PROBLEM — D18.01 HEMANGIOMA OF SKIN AND SUBCUTANEOUS TISSUE: Status: ACTIVE | Noted: 2023-10-17

## 2023-10-17 PROBLEM — D23.39 OTHER BENIGN NEOPLASM OF SKIN OF OTHER PARTS OF FACE: Status: ACTIVE | Noted: 2023-10-17

## 2023-10-17 PROBLEM — D22.71 MELANOCYTIC NEVI OF RIGHT LOWER LIMB, INCLUDING HIP: Status: ACTIVE | Noted: 2023-10-17

## 2023-10-17 PROCEDURE — ? PRESCRIPTION

## 2023-10-17 PROCEDURE — 17000 DESTRUCT PREMALG LESION: CPT | Mod: 59

## 2023-10-17 PROCEDURE — ? COUNSELING

## 2023-10-17 PROCEDURE — 99203 OFFICE O/P NEW LOW 30 MIN: CPT | Mod: 25

## 2023-10-17 PROCEDURE — ? LIQUID NITROGEN

## 2023-10-17 PROCEDURE — 17110 DESTRUCTION B9 LES UP TO 14: CPT

## 2023-10-17 PROCEDURE — 17003 DESTRUCT PREMALG LES 2-14: CPT | Mod: 59

## 2023-10-17 PROCEDURE — ? SUNSCREEN RECOMMENDATIONS

## 2023-10-17 PROCEDURE — ? ADDITIONAL NOTES

## 2023-10-17 RX ORDER — TERBINAFINE HYDROCHLORIDE 1 G/100G
1 CREAM TOPICAL QHS
Qty: 30 | Refills: 3 | Status: ERX | COMMUNITY
Start: 2023-10-17

## 2023-10-17 RX ADMIN — TERBINAFINE HYDROCHLORIDE 1: 1 CREAM TOPICAL at 00:00

## 2023-10-17 ASSESSMENT — LOCATION ZONE DERM
LOCATION ZONE: FACE
LOCATION ZONE: TRUNK
LOCATION ZONE: LEG
LOCATION ZONE: ARM
LOCATION ZONE: TOENAIL

## 2023-10-17 ASSESSMENT — LOCATION DETAILED DESCRIPTION DERM
LOCATION DETAILED: RIGHT PROXIMAL CALF
LOCATION DETAILED: LEFT POSTERIOR SHOULDER
LOCATION DETAILED: LEFT LATERAL ZYGOMA
LOCATION DETAILED: RIGHT GREAT TOENAIL
LOCATION DETAILED: RIGHT MEDIAL INFERIOR CHEST
LOCATION DETAILED: LEFT ANTERIOR DISTAL UPPER ARM
LOCATION DETAILED: LEFT GREAT TOENAIL
LOCATION DETAILED: SUPERIOR LUMBAR SPINE
LOCATION DETAILED: LEFT PROXIMAL DORSAL FOREARM
LOCATION DETAILED: RIGHT SUPERIOR FOREHEAD
LOCATION DETAILED: RIGHT CENTRAL MALAR CHEEK

## 2023-10-17 ASSESSMENT — LOCATION SIMPLE DESCRIPTION DERM
LOCATION SIMPLE: LEFT SHOULDER
LOCATION SIMPLE: LOWER BACK
LOCATION SIMPLE: RIGHT CALF
LOCATION SIMPLE: RIGHT FOREHEAD
LOCATION SIMPLE: LEFT FOREARM
LOCATION SIMPLE: RIGHT CHEEK
LOCATION SIMPLE: LEFT GREAT TOE
LOCATION SIMPLE: LEFT UPPER ARM
LOCATION SIMPLE: LEFT ZYGOMA
LOCATION SIMPLE: CHEST
LOCATION SIMPLE: RIGHT GREAT TOE

## 2023-10-17 NOTE — PROCEDURE: LIQUID NITROGEN
Number Of Freeze-Thaw Cycles: 3 freeze-thaw cycles
Consent: The patient's consent was obtained including but not limited to risks of crusting, scabbing, blistering, scarring, darker or lighter pigmentary change, recurrence, incomplete removal and infection.
Duration Of Freeze Thaw-Cycle (Seconds): 5
Render Note In Bullet Format When Appropriate: No
Application Tool (Optional): Cry-AC
Post-Care Instructions: I reviewed with the patient in detail post-care instructions. Patient is to wear sunprotection, and avoid picking at any of the treated lesions. Pt may apply Vaseline to crusted or scabbing areas.
Render Post-Care Instructions In Note?: yes
Detail Level: Zone
Medical Necessity Clause: This procedure was medically necessary because the lesions that were treated were:
Medical Necessity Information: It is in your best interest to select a reason for this procedure from the list below. All of these items fulfill various CMS LCD requirements except the new and changing color options.
Spray Paint Text: The liquid nitrogen was applied to the skin utilizing a spray paint frosting technique.
Detail Level: Detailed
7

## 2023-10-17 NOTE — PROCEDURE: ADDITIONAL NOTES
Additional Notes: Refer to Aline Medley for benign cosmetic destruction\\nRecommended OTC Cetaphil Rough and Bumpy
Detail Level: Detailed
Render Risk Assessment In Note?: no

## 2023-10-18 PROCEDURE — RXMED WILLOW AMBULATORY MEDICATION CHARGE: Performed by: REGISTERED NURSE

## 2023-10-20 ENCOUNTER — PHARMACY VISIT (OUTPATIENT)
Dept: PHARMACY | Facility: MEDICAL CENTER | Age: 63
End: 2023-10-20
Payer: COMMERCIAL

## 2023-11-07 DIAGNOSIS — G40.209 PARTIAL EPILEPSY WITH IMPAIRMENT OF CONSCIOUSNESS (HCC): ICD-10-CM

## 2023-11-07 DIAGNOSIS — G40.219 INTRACTABLE FOCAL EPILEPSY WITH IMPAIRMENT OF CONSCIOUSNESS (HCC): ICD-10-CM

## 2023-11-08 ENCOUNTER — TELEPHONE (OUTPATIENT)
Dept: NEUROLOGY | Facility: MEDICAL CENTER | Age: 63
End: 2023-11-08
Payer: MEDICARE

## 2023-11-08 RX ORDER — CENOBAMATE 200 MG/1
400 TABLET, FILM COATED ORAL
Qty: 60 TABLET | Refills: 2 | Status: SHIPPED | OUTPATIENT
Start: 2023-11-08 | End: 2024-01-25 | Stop reason: SDUPTHER

## 2023-11-08 NOTE — TELEPHONE ENCOUNTER
Received Refill PA request via [employer]  for Xcopri (Cenobamate) 200 MG Tabs. (Quantity:60, Day Supply:30) - Copay $20.00 Max qty 60 Max DS 30 PA approved previously end date 08/27/2024 ([employer] Voucher for $65.00 please mention voucher to pt.)      Insurance: [employer]  Member ID:  [id number]  BIN: [id number]  PCN: N/A  Group: [id number]     Ran Test claim via [employer] medication Pays for a $20.00 copay. Will outreach to patient to offer specialty pharmacy services and or release to preferred pharmacy

## 2023-11-09 ENCOUNTER — TELEPHONE (OUTPATIENT)
Dept: NEUROLOGY | Facility: MEDICAL CENTER | Age: 63
End: 2023-11-09
Payer: MEDICARE

## 2023-11-09 DIAGNOSIS — G40.219 INTRACTABLE FOCAL EPILEPSY WITH IMPAIRMENT OF CONSCIOUSNESS (HCC): ICD-10-CM

## 2023-11-09 DIAGNOSIS — G40.209 PARTIAL EPILEPSY WITH IMPAIRMENT OF CONSCIOUSNESS (HCC): ICD-10-CM

## 2023-11-09 RX ORDER — CENOBAMATE 200 MG/1
400 TABLET, FILM COATED ORAL
Qty: 60 TABLET | Refills: 2 | Status: CANCELLED | OUTPATIENT
Start: 2023-11-09 | End: 2024-02-07

## 2023-11-09 NOTE — TELEPHONE ENCOUNTER
Attempted to contact patient at [phone] to discuss [facility] and services/benefits offered. No answer, left voicemail.    Leeann Bright  Rx Coordinator   [phone]

## 2023-11-10 PROCEDURE — RXMED WILLOW AMBULATORY MEDICATION CHARGE: Performed by: STUDENT IN AN ORGANIZED HEALTH CARE EDUCATION/TRAINING PROGRAM

## 2023-11-13 ENCOUNTER — PHARMACY VISIT (OUTPATIENT)
Dept: PHARMACY | Facility: MEDICAL CENTER | Age: 63
End: 2023-11-13
Payer: COMMERCIAL

## 2023-11-13 PROCEDURE — RXMED WILLOW AMBULATORY MEDICATION CHARGE: Performed by: NURSE PRACTITIONER

## 2023-11-21 ENCOUNTER — PHARMACY VISIT (OUTPATIENT)
Dept: PHARMACY | Facility: MEDICAL CENTER | Age: 63
End: 2023-11-21
Payer: COMMERCIAL

## 2023-11-22 ENCOUNTER — OFFICE VISIT (OUTPATIENT)
Dept: NEUROLOGY | Facility: MEDICAL CENTER | Age: 63
End: 2023-11-22
Attending: STUDENT IN AN ORGANIZED HEALTH CARE EDUCATION/TRAINING PROGRAM
Payer: MEDICARE

## 2023-11-22 VITALS
OXYGEN SATURATION: 98 % | TEMPERATURE: 97.1 F | SYSTOLIC BLOOD PRESSURE: 114 MMHG | DIASTOLIC BLOOD PRESSURE: 64 MMHG | HEART RATE: 80 BPM | WEIGHT: 164.9 LBS | HEIGHT: 68 IN | BODY MASS INDEX: 24.99 KG/M2

## 2023-11-22 DIAGNOSIS — G40.219 INTRACTABLE FOCAL EPILEPSY WITH IMPAIRMENT OF CONSCIOUSNESS (HCC): ICD-10-CM

## 2023-11-22 DIAGNOSIS — Z85.46 HISTORY OF PROSTATE CANCER: ICD-10-CM

## 2023-11-22 DIAGNOSIS — K13.79 HYPERTROPHY OF UVULA: ICD-10-CM

## 2023-11-22 DIAGNOSIS — R90.89 ABNORMAL BRAIN MRI: ICD-10-CM

## 2023-11-22 DIAGNOSIS — G47.19 EXCESSIVE DAYTIME SLEEPINESS: ICD-10-CM

## 2023-11-22 DIAGNOSIS — J44.9 CHRONIC OBSTRUCTIVE PULMONARY DISEASE, UNSPECIFIED COPD TYPE (HCC): ICD-10-CM

## 2023-11-22 DIAGNOSIS — G25.2 KINETIC TREMOR: ICD-10-CM

## 2023-11-22 DIAGNOSIS — G25.1 TREMOR DUE TO MULTIPLE DRUGS: ICD-10-CM

## 2023-11-22 DIAGNOSIS — G40.209 PARTIAL EPILEPSY WITH IMPAIRMENT OF CONSCIOUSNESS (HCC): ICD-10-CM

## 2023-11-22 DIAGNOSIS — G47.30 SLEEP APNEA, UNSPECIFIED TYPE: ICD-10-CM

## 2023-11-22 PROCEDURE — 3074F SYST BP LT 130 MM HG: CPT | Performed by: STUDENT IN AN ORGANIZED HEALTH CARE EDUCATION/TRAINING PROGRAM

## 2023-11-22 PROCEDURE — 99215 OFFICE O/P EST HI 40 MIN: CPT | Performed by: STUDENT IN AN ORGANIZED HEALTH CARE EDUCATION/TRAINING PROGRAM

## 2023-11-22 PROCEDURE — 3078F DIAST BP <80 MM HG: CPT | Performed by: STUDENT IN AN ORGANIZED HEALTH CARE EDUCATION/TRAINING PROGRAM

## 2023-11-22 PROCEDURE — 99212 OFFICE O/P EST SF 10 MIN: CPT | Performed by: STUDENT IN AN ORGANIZED HEALTH CARE EDUCATION/TRAINING PROGRAM

## 2023-11-22 ASSESSMENT — FIBROSIS 4 INDEX: FIB4 SCORE: 1.15

## 2023-11-22 NOTE — PATIENT INSTRUCTIONS
NEUROLOGY CLINIC VISIT WITH DR. FRANKLIN       PATIENT EXPECTATIONS AND IMPORTANT APPOINTMENT REMINDERS:   You matter to Dr. Franklin and you deserve the best care.   Dr. Franklin prides himself on providing the best possible care to all his patients. He strives to make each appointment meaningful, so that all your concerns are being addressed and all your neurological problems are being optimally treated. In order to achieve these goals for everyone, Dr. Franklin has listed important reminders and the best ways to prepare for each appointment. Please read each item carefully. Thank you!    REFILLS:   Request refills AT LEAST 1 week in advance to ensure you do not run out of medications    [employer]  It is STRONGLY encouraged that ALL patients sign up for [employer]. It is BY FAR the fastest and most convenient way for both Dr. Franklin and patients to obtain timely refills.  If you are having trouble signing up or logging into your account, staff are available to help you. Please ask a medical assistant or staff at the  to assist you.    TEST RESULS:   All labs and diagnostic test results will be reviewed at your next visit, UNLESS  Dr. Franklin determines that there are important findings on the tests need to be acted on sooner. Dr. Franklin will either call or send a message through [employer] if this is the case.    BE PREPARED PRIOR TO EVERY APPOINTMENT:  All patient are responsible for ensuring that ALL test results that were completed outside of the [employer] system have been received by our Neurology Department PRIOR to your appointment with Dr. Franklin.    IMPORTANT:  ALL images (not just the reports) must be sent and uploaded to the [employer] system. Dr. Franklin reviews all images personally prior to each visit. Ensuring that ALL the test results and test images are accessible to Dr. Franklin prior to your appointment is YOUR responsibility and an important part of making the most out of each appointment.   Bring a  [facility] picture ID and an updated insurance card EVERY visit.  It is highly recommended that you bring at every visit a list of the most important topics that you want address. While it may not be possible to address all items on the list in a single visit, preparing a list will ensure that Dr. Franklin addresses the items that are most important to you and your health    PAPERWORK, DOCUMENTATION, LETTER REQUESTS:  You must notify the office ahead of your appointment of all paperwork or letter requests.   Please DO NOT wait until the last minute to make these requests. Please give all paperwork to the medical assistant at the start of the appointment and check-in process. Please note that Dr. Franklin may not be able complete some types of documentation in a single appointment or even within a single day or week. This is why it is important to communicate paperwork requests prior to your appointment and at least 2 weeks prior to any deadlines.    KNOW ALL YOU MEDICATIONS:   AT EVERY SINGLE APPOINTMENT, please bring a list of every single prescribed, non-prescribed, and over the counter medication or supplements you are taking, including ones taken on a rare or intermittent basis.  Include the following information for each prescribed or non-prescribed medications:  Name of medication   The strength of EACH pill/capsule/tablet, etc.   The number of pills/capsules/tablets, etc taken per dose  The number and time of day that doses are taken  For every single Supplement that you take on a routine or intermittent basis, you must include:  The Brand Name   A complete list of every single ingredient, compound, vitamin, and/or mineral in each dose, along with the corresponding amounts/strengths of all ingredients, vitamins, minerals, etc., if such information is provided or known  The number of doses taken per day and time of day doses are taken  If medications are taken on an intermittent or as needed basis, please  "estimate how many days per week or days per month the medications are used  DO NOT just print out your medication list from [employer] or bring a list from a prior appointment or hospitalizations because the information is often often unreliable, inaccurate, outdated, and/or incomplete   The list should be printed or written  If you forget or do not have a list of all the medication, then it is acceptable, although less preferred, to bring all the bottles to the appointment     ARRIVE EARLY FOR ALL VISITS:  Please note that we are unable to accommodate late arrivals as per office policy.  YOU-the patient - (NOT a parent, spouse, or friend) must be physically present at check-in no later than 12 minutes after the scheduled appointment time, or you will be asked to reschedule   Consider scheduling a virtual appointment with Dr. Franklin through [employer] as an alternative if transportation to the clinic is difficult or unavailable   Please note, however, that virtual visits can only be scheduled after being an established patient of Dr. Franklin. All new appointments must be done in-person in clinic  Some insurances will not cover the cost of virtual appointments. Please check with your insurance to find out if these visits are covered    COMMUNICATING URGENT AND NON-URGENT MATTERS:  Your concerns are important and deserve to be heard and addressed. If you have an urgent matter, there are two methods that will ensure your concerns are prioritized appropriately:   Preferred method: Sign-up/Login to your [employer] account and send a message addressed to Dr. Franklin or Margret David (Dr. Franklin's assistant). In the subject line, type \"urgent\" followed by a word or phrase describing the situation (For example, write \"Urgent: Out of antiseuzre med and need refill\" or \"Urgent: Severe side effects to new meds\". In doing this, our staff can ensure urgent messages are triaged appropriately and communicated to Dr. Franklin that day.  Call " [facility] main line at [phone]. Dr. Franklin's voicemail extension is [phone]. When leaving a voice message, specifically indicate if it is urgent (or non-urgent) so that the matter can be triaged appropriately and addressed in a timely manner    Thank you for taking important action in your care!

## 2023-12-04 PROCEDURE — RXMED WILLOW AMBULATORY MEDICATION CHARGE: Performed by: STUDENT IN AN ORGANIZED HEALTH CARE EDUCATION/TRAINING PROGRAM

## 2023-12-05 ENCOUNTER — TELEPHONE (OUTPATIENT)
Dept: PHARMACY | Facility: MEDICAL CENTER | Age: 63
End: 2023-12-05
Payer: MEDICARE

## 2023-12-05 NOTE — TELEPHONE ENCOUNTER
Contact:             Phone number: [phone]     Name of person spoken with and relationship to patient: GIDEON ANTONY [id number]  Medication:   Patient’s Adherence:             How patient is doing on medication: well     How many missed doses and reason: 0     Any new medications: no     Any new conditions: no     Any new allergies: no     Any new side effects: no      Any new diagnoses: no      How many doses remaining: 10     Did patient want to speak with pharmacist: no   Delivery:             Delivery date and method:  12/08      Needs by Date: 12/10     Signature required: no     Any additional details for : YUMIKO Bajwa Appointment Date: YUMIKO   Shipping Address: [address] [place] [id number]    Medication(name, strength and dose): XCOPRI 200MG     Copay: $20   Payment Method: AT    Supplies:    Additional Information:   Pt stated med is not helping 100% yet. Still prone to seizures

## 2023-12-07 ENCOUNTER — OFFICE VISIT (OUTPATIENT)
Dept: MEDICAL GROUP | Facility: IMAGING CENTER | Age: 63
End: 2023-12-07
Payer: MEDICARE

## 2023-12-07 ENCOUNTER — HOSPITAL ENCOUNTER (OUTPATIENT)
Dept: RADIOLOGY | Facility: MEDICAL CENTER | Age: 63
End: 2023-12-07
Attending: PHYSICIAN ASSISTANT
Payer: MEDICARE

## 2023-12-07 ENCOUNTER — PHARMACY VISIT (OUTPATIENT)
Dept: PHARMACY | Facility: MEDICAL CENTER | Age: 63
End: 2023-12-07
Payer: COMMERCIAL

## 2023-12-07 VITALS
BODY MASS INDEX: 24.55 KG/M2 | WEIGHT: 162 LBS | SYSTOLIC BLOOD PRESSURE: 118 MMHG | HEART RATE: 55 BPM | RESPIRATION RATE: 16 BRPM | HEIGHT: 68 IN | TEMPERATURE: 97.9 F | DIASTOLIC BLOOD PRESSURE: 74 MMHG | OXYGEN SATURATION: 99 %

## 2023-12-07 DIAGNOSIS — G40.219 INTRACTABLE FOCAL EPILEPSY WITH IMPAIRMENT OF CONSCIOUSNESS (HCC): Chronic | ICD-10-CM

## 2023-12-07 DIAGNOSIS — J44.9 CHRONIC OBSTRUCTIVE PULMONARY DISEASE, UNSPECIFIED COPD TYPE (HCC): ICD-10-CM

## 2023-12-07 DIAGNOSIS — Z85.46 HISTORY OF PROSTATE CANCER: ICD-10-CM

## 2023-12-07 DIAGNOSIS — Z13.21 ENCOUNTER FOR VITAMIN DEFICIENCY SCREENING: ICD-10-CM

## 2023-12-07 DIAGNOSIS — E78.5 DYSLIPIDEMIA: ICD-10-CM

## 2023-12-07 DIAGNOSIS — Z12.5 PROSTATE CANCER SCREENING: ICD-10-CM

## 2023-12-07 DIAGNOSIS — Z13.1 DIABETES MELLITUS SCREENING: ICD-10-CM

## 2023-12-07 DIAGNOSIS — Z13.0 SCREENING FOR DEFICIENCY ANEMIA: ICD-10-CM

## 2023-12-07 PROBLEM — E55.9 VITAMIN D DEFICIENCY: Status: RESOLVED | Noted: 2022-09-23 | Resolved: 2023-12-07

## 2023-12-07 PROBLEM — R73.01 ELEVATED FASTING GLUCOSE: Status: RESOLVED | Noted: 2022-09-23 | Resolved: 2023-12-07

## 2023-12-07 PROBLEM — G25.2 KINETIC TREMOR: Status: RESOLVED | Noted: 2022-12-08 | Resolved: 2023-12-07

## 2023-12-07 PROCEDURE — 1126F AMNT PAIN NOTED NONE PRSNT: CPT | Performed by: PHYSICIAN ASSISTANT

## 2023-12-07 PROCEDURE — 99214 OFFICE O/P EST MOD 30 MIN: CPT | Performed by: PHYSICIAN ASSISTANT

## 2023-12-07 PROCEDURE — 71046 X-RAY EXAM CHEST 2 VIEWS: CPT

## 2023-12-07 PROCEDURE — 3074F SYST BP LT 130 MM HG: CPT | Performed by: PHYSICIAN ASSISTANT

## 2023-12-07 PROCEDURE — 3078F DIAST BP <80 MM HG: CPT | Performed by: PHYSICIAN ASSISTANT

## 2023-12-07 RX ORDER — ATORVASTATIN CALCIUM 20 MG/1
40 TABLET, FILM COATED ORAL NIGHTLY
Qty: 90 TABLET | Refills: 3
Start: 2023-12-07 | End: 2023-12-11 | Stop reason: SDUPTHER

## 2023-12-07 ASSESSMENT — PAIN SCALES - GENERAL: PAINLEVEL: NO PAIN

## 2023-12-07 ASSESSMENT — FIBROSIS 4 INDEX: FIB4 SCORE: 1.15

## 2023-12-07 NOTE — PROGRESS NOTES
Subjective:     CC:   Chief Complaint   Patient presents with    [facility]       HPI:   Fredy presents today to discuss:    COPD (chronic obstructive pulmonary disease) (HCC)  Patient states he was diagnosed with COPD 20 years ago.  No tobacco use history.  Currently on Trelegy.  He does have a history of pneumonia in 2012.  No follow-up imaging since that time.  Breathing currently controlled.    Dyslipidemia  Chronic, uncontrolled on atorvastatin 20 mg daily.    History of prostate cancer  Status post prostatectomy.  Due for PSA.  No longer following with urology.    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  On Cenobamate 400 mg qHS. Has upcoming MRI brain imaging.       Past Medical History:   Diagnosis Date    Cancer (HCC) 05/2012    prostate s/p prostatectomy    COPD (chronic obstructive pulmonary disease) (HCC)     Dyslipidemia     Partial epilepsy with impairment of consciousness (HCC)      ICD-10 transition     Family History   Problem Relation Age of Onset    Alcohol abuse Mother         cirrhosis    Alcohol abuse Father     Psychiatric Illness Father     Diabetes Father     Alcohol/Drug Brother     Cancer Neg Hx     Stroke Neg Hx     Heart Disease Neg Hx      Past Surgical History:   Procedure Laterality Date    PROSTATECTOMY ROBOTIC  5/29/2012    Performed by JACOB RAMOS at SURGERY [facility]    COLONOSCOPY  8/10    grade 1 int. hemorrhoids     Social History     Tobacco Use    Smoking status: Never    Smokeless tobacco: Never   Vaping Use    Vaping Use: Never used   Substance Use Topics    Alcohol use: Not Currently     Comment: stopped drinking 5 years ago    Drug use: Not Currently     Comment: previous marijuana     Social History     Social History Narrative    Born and raised in [place]    , no kids    Retired, printer     Current Outpatient Medications Ordered in Epic   Medication Sig Dispense Refill     "DHA-EPA-Vitamin E (OMEGA-3 COMPLEX PO) Take  by mouth.      atorvastatin (LIPITOR) 20 MG Tab Take 2 Tablets by mouth every evening. 90 Tablet 3    Cenobamate (XCOPRI) 200 MG Tab Take 2 tablets (400 mg) by mouth at bedtime 60 Tablet 2    terbinafine (LAMISIL) 1 % cream Apply to affected areas on feet nightly 30 g 3    fluticasone-umeclidinium-vilanterol (TRELEGY ELLIPTA) 200-62.5-25 mcg/act inhaler Inhale 1 puff every day. 60 Each 6    Cholecalciferol (VITAMIN D) 2000 UNIT Tab Take 2,000 Units by mouth every day.      albuterol 108 (90 Base) MCG/ACT Aero Soln inhalation aerosol USE 2 INHALATIONS EVERY 6 HOURS AS NEEDED FOR SHORTNESS OF BREATH 8.5 g 10     No current Epic-ordered facility-administered medications on file.     Patient has no known allergies.    PMH/PSH/FH/Social history reviewed.  Vaccinations discussed.  Previous records and labs reviewed. Discussed age appropriate anticipatory guidance. RSV and covid at the pharmacy.    ROS: see hpi  Gen: no fevers/chills  Pulm: no sob, no cough  CV: no chest pain, no palpitations, no edema  GI: no nausea/vomiting, no diarrhea  Skin: no rash    Objective:   Exam:  /74 (BP Location: Left arm, Patient Position: Sitting, BP Cuff Size: Adult)   Pulse (!) 55   Temp 36.6 °C (97.9 °F) (Temporal)   Resp 16   Ht 1.727 m (5' 8\")   Wt 73.5 kg (162 lb)   SpO2 99%   BMI 24.63 kg/m²    Body mass index is 24.63 kg/m².    Gen: Alert and oriented, No apparent distress.  HEENT: Head atraumatic, normocephalic. Pupils equal and round.  Neck: Neck is supple without lymphadenopathy.   Lungs: Normal effort, CTA bilaterally, no wheezes, rhonchi, or rales  CV: Regular rate and rhythm. No murmurs, rubs, or gallops.  ABD: +BS. Non-tender, non-distended. No rebound, rigidity, or guarding.  Ext: No clubbing, cyanosis, edema.    Assessment & Plan:     63 y.o. male with the following -     1. Dyslipidemia  Chronic, uncontrolled.  Increase atorvastatin to 40 mg daily.  Repeat labs in 3 " to 4 weeks.  - atorvastatin (LIPITOR) 20 MG Tab; Take 2 Tablets by mouth every evening.  Dispense: 90 Tablet; Refill: 3  - Lipid Profile; Future    2. Chronic obstructive pulmonary disease, unspecified COPD type (HCC)  PFT reviewed.  Check chest x-ray. Continue Trelegy.  - DX-CHEST-2 VIEWS; Future    3. 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 Notes reviewed. Await MRI brain.     4. History of prostate cancer  S/P prostatectomy. Check PSA.     5. Diabetes mellitus screening  - URINALYSIS,CULTURE IF INDICATED; Future  - Comp Metabolic Panel; Future    6. Screening for deficiency anemia  - VITAMIN B12; Future  - CBC WITH DIFFERENTIAL; Future    7. Encounter for vitamin deficiency screening  - VITAMIN B12; Future  - VITAMIN D,25 HYDROXY (DEFICIENCY); Future    8. Prostate cancer screening  - PROSTATE SPECIFIC AG SCREENING; Future    Return in about 4 months (around 4/7/2024) for Annual wellness exam.    Colleen Merlos PA-C (Baker)  Physician Assistant Certified  [facility]    Please note that this dictation was created using voice recognition software. I have made every reasonable attempt to correct obvious errors, but I expect that there are errors of grammar and possibly content that I did not discover before finalizing the note.

## 2023-12-07 NOTE — PATIENT INSTRUCTIONS
It was a pleasure meeting with you today at [facility]!    Your medical history/records and medications were reviewed today.     UPDATE on MyChart Results: If you have blood work, and/or imaging studies, or any other test or procedure completed, you will have access to results as soon as they become available in MyChart. Recently, these results will be available for review at the same time that your provider is able to see results!    This will likely mean you will see a result before your provider has had a chance to review and discuss with you.  Some results or care notes may be hard to understand and may be serious in nature.    We look at every result and your provider will contact you to explain what they mean and discuss appropriate next steps. Please allow for at least 72 business hours for chart and result review.     We prefer that you wait for your care team to contact you with your results.  Often, your provider will discuss your results with you at your next appointment. We look forward to continuing to partner with you in your care.    Please review my practice information below:    If you have any prescription refill requests, please send them via "[employer] or discuss with your provider at the start of your office visits. Please allow 3-5 business days for lab and testing review and you will be contacted via "[employer] with those results, or if advised to make a follow up appointment regarding those results, then please do so.     Once resulted, your lab/test/imaging results will show up automatically in your MyChart. Please wait for my interpretation and recommendations prior to viewing your results to avoid any unnecessary confusion or misinterpretation. I will address all of the lab values that I interpret as abnormal and message you accordingly on your MyChart. I will always send you a message about your results even if they are normal. If you do not hear back from me within 5-7 business  days after completing your tests, then please send me a message on [employer] so I can obtain your results (especially if you went to an outside lab or imaging center - [facility], [facility], etc).     If you have any additional questions or concerns beyond my interpretation of your results, please make an appointment with me to discuss in further detail.    Please only use the [employer] messaging system for questions regarding your most recent appointment or if advised to use otherwise (glucose or blood pressure reporting).     If you have any new problems or concerns, you must make an appointment to discuss. This includes any referral requests, lab requests (unless advised to notify me for pre-appt labs), medication side effects, or request for medication adjustments.     Please arrive 15 minutes prior to your appointment time to complete your check-in and intake with the medical assistant.      Thank you,    Colleen Merlos PA-C (Baker)  Physician Assistant Certified  [facility]    -----------------------------------------------------------------    Attn: Patients of [facility]:    In an effort to continue to provide excellent and efficient care to our patients, it is vital that we continue to use our resources appropriately. With that, this is a reminder that [employer] is used for prescription refill requests, test results, virtual visits, and chart review only.     Any new questions, concerns/conditions, lab/imaging requests, medication adjustments, new prescriptions, or referral requests do require an appointment (virtually or in person), unless discussed otherwise at your most recent appointment.     Thank you for your understanding,    [facility]

## 2023-12-07 NOTE — ASSESSMENT & PLAN NOTE
Patient states he was diagnosed with COPD 20 years ago.  No tobacco use history.  Currently on Trelegy.  He does have a history of pneumonia in 2012.  No follow-up imaging since that time.  Breathing currently controlled.

## 2023-12-11 DIAGNOSIS — E78.5 DYSLIPIDEMIA: ICD-10-CM

## 2023-12-11 PROCEDURE — RXMED WILLOW AMBULATORY MEDICATION CHARGE: Performed by: PHYSICIAN ASSISTANT

## 2023-12-11 PROCEDURE — RXMED WILLOW AMBULATORY MEDICATION CHARGE: Performed by: REGISTERED NURSE

## 2023-12-11 RX ORDER — ATORVASTATIN CALCIUM 40 MG/1
40 TABLET, FILM COATED ORAL NIGHTLY
Qty: 90 TABLET | Refills: 3 | Status: SHIPPED | OUTPATIENT
Start: 2023-12-11

## 2023-12-20 ENCOUNTER — PHARMACY VISIT (OUTPATIENT)
Dept: PHARMACY | Facility: MEDICAL CENTER | Age: 63
End: 2023-12-20
Payer: COMMERCIAL

## 2023-12-28 PROCEDURE — RXMED WILLOW AMBULATORY MEDICATION CHARGE: Performed by: NURSE PRACTITIONER

## 2024-01-02 ENCOUNTER — PHARMACY VISIT (OUTPATIENT)
Dept: PHARMACY | Facility: MEDICAL CENTER | Age: 64
End: 2024-01-02
Payer: COMMERCIAL

## 2024-01-02 ENCOUNTER — APPOINTMENT (OUTPATIENT)
Dept: RADIOLOGY | Facility: MEDICAL CENTER | Age: 64
End: 2024-01-02
Attending: STUDENT IN AN ORGANIZED HEALTH CARE EDUCATION/TRAINING PROGRAM
Payer: MEDICARE

## 2024-01-02 DIAGNOSIS — G40.219 INTRACTABLE FOCAL EPILEPSY WITH IMPAIRMENT OF CONSCIOUSNESS (HCC): ICD-10-CM

## 2024-01-02 PROCEDURE — 70553 MRI BRAIN STEM W/O & W/DYE: CPT

## 2024-01-02 PROCEDURE — A9579 GAD-BASE MR CONTRAST NOS,1ML: HCPCS | Performed by: STUDENT IN AN ORGANIZED HEALTH CARE EDUCATION/TRAINING PROGRAM

## 2024-01-02 PROCEDURE — 700117 HCHG RX CONTRAST REV CODE 255: Performed by: STUDENT IN AN ORGANIZED HEALTH CARE EDUCATION/TRAINING PROGRAM

## 2024-01-02 RX ADMIN — GADOTERIDOL 15 ML: 279.3 INJECTION, SOLUTION INTRAVENOUS at 16:09

## 2024-01-04 PROCEDURE — RXMED WILLOW AMBULATORY MEDICATION CHARGE: Performed by: STUDENT IN AN ORGANIZED HEALTH CARE EDUCATION/TRAINING PROGRAM

## 2024-01-05 ENCOUNTER — TELEPHONE (OUTPATIENT)
Dept: PHARMACY | Facility: MEDICAL CENTER | Age: 64
End: 2024-01-05
Payer: MEDICARE

## 2024-01-05 NOTE — TELEPHONE ENCOUNTER
Contact:             Phone number: [phone]     Name of person spoken with and relationship to patient: PT WIFE- GIDEON ANTONY  Medication:   Patient’s Adherence:             How patient is doing on medication: well     How many missed doses and reason: 0     Any new medications: no     Any new conditions: no     Any new allergies: no     Any new side effects: no      Any new diagnoses: no      How many doses remaininDS     Did patient want to speak with pharmacist: no   Delivery:             Delivery date and method:       Needs by Date:      Signature required: no     Any additional details for : YUMIKO Bajwa Appointment Date: YUMIKO   Shipping Address: [address] [place] [address]   Medication(name, strength and dose): XCOPRI 60/30DS $20   Copay: $20   Payment Method: AT     Supplies:   Additional Information:  NEEDS TO BE ORDERED      
(4) no limitation

## 2024-01-09 ENCOUNTER — PHARMACY VISIT (OUTPATIENT)
Dept: PHARMACY | Facility: MEDICAL CENTER | Age: 64
End: 2024-01-09
Payer: COMMERCIAL

## 2024-01-12 ENCOUNTER — APPOINTMENT (OUTPATIENT)
Dept: LAB | Facility: MEDICAL CENTER | Age: 64
End: 2024-01-12
Payer: MEDICARE

## 2024-01-18 PROCEDURE — RXMED WILLOW AMBULATORY MEDICATION CHARGE: Performed by: NURSE PRACTITIONER

## 2024-01-18 PROCEDURE — RXMED WILLOW AMBULATORY MEDICATION CHARGE: Performed by: PHYSICIAN ASSISTANT

## 2024-01-19 ENCOUNTER — PHARMACY VISIT (OUTPATIENT)
Dept: PHARMACY | Facility: MEDICAL CENTER | Age: 64
End: 2024-01-19
Payer: COMMERCIAL

## 2024-01-25 ENCOUNTER — HOSPITAL ENCOUNTER (OUTPATIENT)
Dept: LAB | Facility: MEDICAL CENTER | Age: 64
End: 2024-01-25
Attending: PHYSICIAN ASSISTANT
Payer: MEDICARE

## 2024-01-25 DIAGNOSIS — Z13.1 DIABETES MELLITUS SCREENING: ICD-10-CM

## 2024-01-25 DIAGNOSIS — Z13.21 ENCOUNTER FOR VITAMIN DEFICIENCY SCREENING: ICD-10-CM

## 2024-01-25 DIAGNOSIS — Z13.0 SCREENING FOR DEFICIENCY ANEMIA: ICD-10-CM

## 2024-01-25 DIAGNOSIS — E78.5 DYSLIPIDEMIA: ICD-10-CM

## 2024-01-25 DIAGNOSIS — Z12.5 PROSTATE CANCER SCREENING: ICD-10-CM

## 2024-01-25 LAB
25(OH)D3 SERPL-MCNC: 44 NG/ML (ref 30–100)
ALBUMIN SERPL BCP-MCNC: 4.4 G/DL (ref 3.2–4.9)
ALBUMIN/GLOB SERPL: 1.5 G/DL
ALP SERPL-CCNC: 102 U/L (ref 30–99)
ALT SERPL-CCNC: 21 U/L (ref 2–50)
ANION GAP SERPL CALC-SCNC: 12 MMOL/L (ref 7–16)
APPEARANCE UR: CLEAR
AST SERPL-CCNC: 20 U/L (ref 12–45)
BASOPHILS # BLD AUTO: 0.7 % (ref 0–1.8)
BASOPHILS # BLD: 0.03 K/UL (ref 0–0.12)
BILIRUB SERPL-MCNC: 0.3 MG/DL (ref 0.1–1.5)
BILIRUB UR QL STRIP.AUTO: NEGATIVE
BUN SERPL-MCNC: 12 MG/DL (ref 8–22)
CALCIUM ALBUM COR SERPL-MCNC: 8.8 MG/DL (ref 8.5–10.5)
CALCIUM SERPL-MCNC: 9.1 MG/DL (ref 8.5–10.5)
CHLORIDE SERPL-SCNC: 103 MMOL/L (ref 96–112)
CHOLEST SERPL-MCNC: 179 MG/DL (ref 100–199)
CO2 SERPL-SCNC: 24 MMOL/L (ref 20–33)
COLOR UR: YELLOW
CREAT SERPL-MCNC: 0.75 MG/DL (ref 0.5–1.4)
EOSINOPHIL # BLD AUTO: 0.19 K/UL (ref 0–0.51)
EOSINOPHIL NFR BLD: 4.3 % (ref 0–6.9)
ERYTHROCYTE [DISTWIDTH] IN BLOOD BY AUTOMATED COUNT: 49.4 FL (ref 35.9–50)
GFR SERPLBLD CREATININE-BSD FMLA CKD-EPI: 100 ML/MIN/1.73 M 2
GLOBULIN SER CALC-MCNC: 2.9 G/DL (ref 1.9–3.5)
GLUCOSE SERPL-MCNC: 104 MG/DL (ref 65–99)
GLUCOSE UR STRIP.AUTO-MCNC: NEGATIVE MG/DL
HCT VFR BLD AUTO: 44.3 % (ref 42–52)
HDLC SERPL-MCNC: 47 MG/DL
HGB BLD-MCNC: 14.7 G/DL (ref 14–18)
IMM GRANULOCYTES # BLD AUTO: 0.01 K/UL (ref 0–0.11)
IMM GRANULOCYTES NFR BLD AUTO: 0.2 % (ref 0–0.9)
KETONES UR STRIP.AUTO-MCNC: NEGATIVE MG/DL
LDLC SERPL CALC-MCNC: 113 MG/DL
LEUKOCYTE ESTERASE UR QL STRIP.AUTO: NEGATIVE
LYMPHOCYTES # BLD AUTO: 1.48 K/UL (ref 1–4.8)
LYMPHOCYTES NFR BLD: 33.3 % (ref 22–41)
MCH RBC QN AUTO: 32.2 PG (ref 27–33)
MCHC RBC AUTO-ENTMCNC: 33.2 G/DL (ref 32.3–36.5)
MCV RBC AUTO: 97.1 FL (ref 81.4–97.8)
MICRO URNS: NORMAL
MONOCYTES # BLD AUTO: 0.48 K/UL (ref 0–0.85)
MONOCYTES NFR BLD AUTO: 10.8 % (ref 0–13.4)
NEUTROPHILS # BLD AUTO: 2.25 K/UL (ref 1.82–7.42)
NEUTROPHILS NFR BLD: 50.7 % (ref 44–72)
NITRITE UR QL STRIP.AUTO: NEGATIVE
NRBC # BLD AUTO: 0 K/UL
NRBC BLD-RTO: 0 /100 WBC (ref 0–0.2)
PH UR STRIP.AUTO: 7 [PH] (ref 5–8)
PLATELET # BLD AUTO: 282 K/UL (ref 164–446)
PMV BLD AUTO: 9.3 FL (ref 9–12.9)
POTASSIUM SERPL-SCNC: 4.3 MMOL/L (ref 3.6–5.5)
PROT SERPL-MCNC: 7.3 G/DL (ref 6–8.2)
PROT UR QL STRIP: NEGATIVE MG/DL
PSA SERPL-MCNC: <0.02 NG/ML (ref 0–4)
RBC # BLD AUTO: 4.56 M/UL (ref 4.7–6.1)
RBC UR QL AUTO: NEGATIVE
SODIUM SERPL-SCNC: 139 MMOL/L (ref 135–145)
SP GR UR STRIP.AUTO: 1.01
TRIGL SERPL-MCNC: 96 MG/DL (ref 0–149)
UROBILINOGEN UR STRIP.AUTO-MCNC: 0.2 MG/DL
VIT B12 SERPL-MCNC: 334 PG/ML (ref 211–911)
WBC # BLD AUTO: 4.4 K/UL (ref 4.8–10.8)

## 2024-01-25 PROCEDURE — 36415 COLL VENOUS BLD VENIPUNCTURE: CPT

## 2024-01-25 PROCEDURE — 80061 LIPID PANEL: CPT

## 2024-01-25 PROCEDURE — 80053 COMPREHEN METABOLIC PANEL: CPT

## 2024-01-25 PROCEDURE — 82607 VITAMIN B-12: CPT

## 2024-01-25 PROCEDURE — 84153 ASSAY OF PSA TOTAL: CPT | Mod: GA

## 2024-01-25 PROCEDURE — 85025 COMPLETE CBC W/AUTO DIFF WBC: CPT

## 2024-01-25 PROCEDURE — 81003 URINALYSIS AUTO W/O SCOPE: CPT

## 2024-01-25 PROCEDURE — 82306 VITAMIN D 25 HYDROXY: CPT | Mod: GA

## 2024-01-29 PROCEDURE — RXMED WILLOW AMBULATORY MEDICATION CHARGE: Performed by: STUDENT IN AN ORGANIZED HEALTH CARE EDUCATION/TRAINING PROGRAM

## 2024-01-31 ENCOUNTER — PHARMACY VISIT (OUTPATIENT)
Dept: PHARMACY | Facility: MEDICAL CENTER | Age: 64
End: 2024-01-31
Payer: COMMERCIAL

## 2024-02-01 ENCOUNTER — TELEPHONE (OUTPATIENT)
Dept: PHARMACY | Facility: MEDICAL CENTER | Age: 64
End: 2024-02-01
Payer: MEDICARE

## 2024-02-01 NOTE — TELEPHONE ENCOUNTER
Contact:             Phone number: [phone]     Name of person spoken with and relationship to patient: GIDEON ANTONY  Medication:   Patient’s Adherence:             How patient is doing on medication: well     How many missed doses and reason: 0     Any new medications: no     Any new conditions: no     Any new allergies: no     Any new side effects: no      Any new diagnoses: no      How many doses remaininDS     Did patient want to speak with pharmacist: no   Delivery:             Delivery date and method:       Needs by Date:      Signature required: no     Any additional details for : YUMIKO Bajwa Appointment Date: YUMIKO   Shipping Address: [address] [place] [address]   Medication(name, strength and dose): XCOPRI 60/30DS $20   Copay: $20   Payment Method:    Supplies:    Additional Information:

## 2024-02-16 PROCEDURE — RXMED WILLOW AMBULATORY MEDICATION CHARGE: Performed by: PHYSICIAN ASSISTANT

## 2024-02-16 PROCEDURE — RXMED WILLOW AMBULATORY MEDICATION CHARGE: Performed by: NURSE PRACTITIONER

## 2024-02-17 ENCOUNTER — PHARMACY VISIT (OUTPATIENT)
Dept: PHARMACY | Facility: MEDICAL CENTER | Age: 64
End: 2024-02-17
Payer: COMMERCIAL

## 2024-02-22 ENCOUNTER — TELEPHONE (OUTPATIENT)
Dept: PHARMACY | Facility: MEDICAL CENTER | Age: 64
End: 2024-02-22
Payer: MEDICARE

## 2024-02-22 NOTE — TELEPHONE ENCOUNTER
Contact:             Phone number: [phone]     Name of person spoken with and relationship to patient: ROXI (SPOUSE) GIDEON ANTONY  Medication:   Patient’s Adherence:             How patient is doing on medication: well     How many missed doses and reason: 0     Any new medications: no     Any new conditions: no     Any new allergies: no     Any new side effects: no      Any new diagnoses: no      How many doses remainin DS     Did patient want to speak with pharmacist: no   Delivery:             Delivery date and method:       Needs by Date:      Signature required: no     Any additional details for : YUMIKO Bajwa Appointment Date: UYMIKO   Shipping Address: [address] [place] [id number]   Medication(name, strength and dose): XCOPRI 60/30DS $20   Copay: $20   Payment Method:    Supplies:    Additional Information:   NO FURTHER QUESTIONS OR CONCERNS AT THIS TIME

## 2024-02-27 ENCOUNTER — PATIENT MESSAGE (OUTPATIENT)
Dept: NEUROLOGY | Facility: MEDICAL CENTER | Age: 64
End: 2024-02-27
Payer: MEDICARE

## 2024-03-14 PROCEDURE — RXMED WILLOW AMBULATORY MEDICATION CHARGE: Performed by: PHYSICIAN ASSISTANT

## 2024-03-14 PROCEDURE — RXMED WILLOW AMBULATORY MEDICATION CHARGE: Performed by: REGISTERED NURSE

## 2024-03-18 ENCOUNTER — PHARMACY VISIT (OUTPATIENT)
Dept: PHARMACY | Facility: MEDICAL CENTER | Age: 64
End: 2024-03-18
Payer: COMMERCIAL

## 2024-03-20 ENCOUNTER — TELEPHONE (OUTPATIENT)
Dept: NEUROLOGY | Facility: MEDICAL CENTER | Age: 64
End: 2024-03-20

## 2024-03-20 ENCOUNTER — OFFICE VISIT (OUTPATIENT)
Dept: NEUROLOGY | Facility: MEDICAL CENTER | Age: 64
End: 2024-03-20
Attending: STUDENT IN AN ORGANIZED HEALTH CARE EDUCATION/TRAINING PROGRAM
Payer: MEDICARE

## 2024-03-20 VITALS
BODY MASS INDEX: 26.7 KG/M2 | HEIGHT: 68 IN | HEART RATE: 56 BPM | DIASTOLIC BLOOD PRESSURE: 62 MMHG | WEIGHT: 176.15 LBS | TEMPERATURE: 96.6 F | OXYGEN SATURATION: 98 % | SYSTOLIC BLOOD PRESSURE: 120 MMHG

## 2024-03-20 DIAGNOSIS — Z91.89 AT RISK FOR SLEEP APNEA: ICD-10-CM

## 2024-03-20 DIAGNOSIS — G40.209 PARTIAL EPILEPSY WITH IMPAIRMENT OF CONSCIOUSNESS (HCC): ICD-10-CM

## 2024-03-20 DIAGNOSIS — Z85.46 HISTORY OF PROSTATE CANCER: ICD-10-CM

## 2024-03-20 DIAGNOSIS — G25.1 TREMOR DUE TO MULTIPLE DRUGS: ICD-10-CM

## 2024-03-20 DIAGNOSIS — J44.9 CHRONIC OBSTRUCTIVE PULMONARY DISEASE, UNSPECIFIED COPD TYPE (HCC): ICD-10-CM

## 2024-03-20 DIAGNOSIS — K13.79 HYPERTROPHY OF UVULA: ICD-10-CM

## 2024-03-20 DIAGNOSIS — G25.2 KINETIC TREMOR: ICD-10-CM

## 2024-03-20 DIAGNOSIS — G40.219 INTRACTABLE FOCAL EPILEPSY WITH IMPAIRMENT OF CONSCIOUSNESS (HCC): ICD-10-CM

## 2024-03-20 DIAGNOSIS — G25.2 CEREBELLAR TREMOR: ICD-10-CM

## 2024-03-20 PROCEDURE — 99212 OFFICE O/P EST SF 10 MIN: CPT | Performed by: STUDENT IN AN ORGANIZED HEALTH CARE EDUCATION/TRAINING PROGRAM

## 2024-03-20 PROCEDURE — 99213 OFFICE O/P EST LOW 20 MIN: CPT | Performed by: STUDENT IN AN ORGANIZED HEALTH CARE EDUCATION/TRAINING PROGRAM

## 2024-03-20 PROCEDURE — 3078F DIAST BP <80 MM HG: CPT | Performed by: STUDENT IN AN ORGANIZED HEALTH CARE EDUCATION/TRAINING PROGRAM

## 2024-03-20 PROCEDURE — 3074F SYST BP LT 130 MM HG: CPT | Performed by: STUDENT IN AN ORGANIZED HEALTH CARE EDUCATION/TRAINING PROGRAM

## 2024-03-20 RX ORDER — CENOBAMATE 200 MG/1
400 TABLET, FILM COATED ORAL
Qty: 60 TABLET | Refills: 5 | Status: SHIPPED | OUTPATIENT
Start: 2024-03-20 | End: 2024-09-16

## 2024-03-20 ASSESSMENT — PATIENT HEALTH QUESTIONNAIRE - PHQ9: CLINICAL INTERPRETATION OF PHQ2 SCORE: 0

## 2024-03-20 ASSESSMENT — FIBROSIS 4 INDEX: FIB4 SCORE: 0.99

## 2024-03-20 NOTE — TELEPHONE ENCOUNTER
Xcopri (Cenobamate) 200 MG Tabs    RTS - NEXT RFL [id number] DAYS TO RFL 2 LAST FILL [id number] VIA RETAIL [id number]  + - 03/20/2024 9:02am [facility]

## 2024-03-20 NOTE — PATIENT INSTRUCTIONS
NEUROLOGY CLINIC VISIT WITH DR. FRANKLIN     PLEASE READ THIS ENTIRE DOCUMENT CAREFULLY AND COMPLETELY:    First and foremost, you matter to Dr. Franklin and you deserve the best care.   Dr. Franklin prides himself on providing the best possible care to all his patients. He strives to make each appointment meaningful, so that all your concerns are being addressed and all your neurological problems are being optimally treated. In order to achieve these goals for everyone, Dr. Franklin has listed important reminders and the best ways to prepare for each appointment. Please read each item carefully. Thank you!    Due to the high volume of patients we are trying to help, your physician will not be able to respond by phone or in [employer] to your routine concerns between appointments.  This does not reflect a lack of interest or concern for you or your diagnosis.  Please bring these questions and concerns to your appointment where your physician can answer.  Please relay more pressing concerns to our office, either via [employer], or by phone; if not able to reach us please visit nearby Urgent Care Center or Emergency Department.  If any emergent medical needs, please seek emergent medical help and/or call 911.    Also, please note that we are not able to fill out paperwork that might be related to your work, utility company, disability, and/or driving, among others, in between the visits.  Please schedule a dedicated appointment to address any and all paperwork.  This is not due to lack of concern or interest for your disease-related work/administrative problems, but to make sure that we provide the best possible care and to fill out your paperwork in a correct, complete, and timely manner.  ------------------------------------------------------------------------------------------  Please let our office know if you have any changes in your seizure frequency and/characteristics.     Please keep a diary of your seizures and bring it  with you to each appointment.    Please take vitamin D3 5927-0163 internation units daily.     Please abstain from driving until further notice    If you are a biological female with epilepsy who is of reproductive age, who is actively breastfeeding, and/or who infants/young children:  Please take folic acid 1 mg daily. This is an over-the-counter supplement that is recommended to prevent certain developmental problems in your baby, in case you become pregnant in the future.  It is critical that you let our office know as soon as you become pregnant or plan to become pregnant.  If you are caring for a baby/young child, please make sure to be sitting on a soft surface while holding your baby/young child, so in case you have a seizure, your baby/young child is not injured due to fall.   Please let us know if, while breastfeeding, you observe that your baby is excessively sleepy and/or has other behavioral changes. Because many antiseizure medications are collected in breast milk, some nursing babies can suffer adverse medication effects.    Please note that the following might precipitate seizures:   missed doses of antiseizure medications  being sick with a fever, stress  Fatigue  sleep deprivation or abnormal sleeping patterns  not eating regularly  not drinking enough water  drinking too much alcohol  stopping alcohol suddenly if you are currently using it on a regular/daily basis,   using recreational drugs, among others.    Please note that the following might lead to an injury or even be life-threatening in the event you have a seizure and/or lose awareness while:  being in a large body of water by yourself, such as bath, pool, lake, ocean, among others (risk of drowning)  being on unprotected heights (risk of fall)  being around and/or operating heavy machinery (risk of injury)  being around open fire/hot surfaces (risk of burns)  any other activities/circumstances, in which if you lose awareness, you might  injure yourself and/or others.  -------------------------------------------------------------------------------------------  SUDEP (SUDDEN UNEXPECTED DEATH IN EPILEPSY)  It is important that your seizures are well controlled and you have none or have them rarely. In addition to avoiding injury related to breakthrough seizures, frequent seizures increase risk of SUDEP (sudden unexpected death in epilepsy), where a person goes into a seizure and then never wakes up. The best way to prevent SUDEP is to control your seizures well.   ------------------------------------------------------------------------------------------  Please call for help (crisis line and/or 911) in case you have thoughts of harming yourself and/or others.  ------------------------------------------------------------------------------------------  INSTRUCTIONS FOR YOUR FAMILY/CAREGIVERS:  Please call 911 if the patient has a seizure longer than 2-3 minutes, if seizures are back to back without her recovering to her baseline, or she does not start recovering within 5-10 minutes after the seizure stops. During the seizure - please turn her on her side, please make sure her head is protected (for example, you should put a pillow under her head, if one is available), and please do not put anything in her mouth.   ------------------------------------------------------------------------------------------  PATIENT EXPECTATIONS,  IMPORTANT APPOINTMENT REMINDERS, AND ADDITIONAL HELPFUL TIPS:   REFILLS:   Request refills AT LEAST 1 week in advance to ensure you do not run out of medications    MyChart  It is STRONGLY encouraged that ALL patients sign up for MyChart. It is BY FAR the fastest and most convenient way for both Dr. Franklin and patients to obtain timely refills.  If you are having trouble signing up or logging into your account, staff are available to help you. Please ask a medical assistant or staff at the  to assist you.    TEST RESULS:    All labs and diagnostic test results will be reviewed at your next visit, UNLESS  Dr. Franklin determines that there are important findings on the tests need to be acted on sooner. Dr. Franklin will either call or send a message through [employer] if this is the case.    BE PREPARED PRIOR TO EVERY APPOINTMENT:  All patient are responsible for ensuring that ALL test results that were completed outside of the [facility] system have been received by our Neurology Department PRIOR to your appointment with Dr. Franklin.    IMPORTANT:  ALL images (not just the reports) must be sent and uploaded to the [facility] system. Dr. Franklin reviews all images personally prior to each visit. Ensuring that ALL the test results and test images are accessible to Dr. Franklin prior to your appointment is YOUR responsibility and an important part of making the most out of each appointment.   Bring a government-issues picture ID and an updated insurance card EVERY visit.  It is highly recommended that you bring at every visit a list of the most important topics that you want address. While it may not be possible to address all items on the list in a single visit, preparing a list will ensure that Dr. Franklin addresses the items that are most important to you and your health    PAPERWORK, DOCUMENTATION, LETTER REQUESTS:  You must notify the office ahead of your appointment of all paperwork or letter requests.   Please DO NOT wait until the last minute to make these requests. Please give all paperwork to the medical assistant at the start of the appointment and check-in process. Please note that Dr. Franklin may not be able complete some types of documentation in a single appointment or even within a single day or week. This is why it is important to communicate paperwork requests prior to your appointment and at least 2 weeks prior to any deadlines.    KNOW ALL YOU MEDICATIONS:   AT EVERY SINGLE APPOINTMENT, please bring a list of every single prescribed,  non-prescribed, and over the counter medication or supplements you are taking, including ones taken on a rare or intermittent basis.  Include the following information for each prescribed or non-prescribed medications:  Name of medication   The strength of EACH pill/capsule/tablet, etc.   The number of pills/capsules/tablets, etc taken per dose  The number and time of day that doses are taken  For every single Supplement that you take on a routine or intermittent basis, you must include:  The Brand Name   A complete list of every single ingredient, compound, vitamin, and/or mineral in each dose, along with the corresponding amounts/strengths of all ingredients, vitamins, minerals, etc., if such information is provided or known  The number of doses taken per day and time of day doses are taken  If medications are taken on an intermittent or as needed basis, please estimate how many days per week or days per month the medications are used  DO NOT just print out your medication list from [email or web address] or bring a list from a prior appointment or hospitalizations because the information is often often unreliable, inaccurate, outdated, and/or incomplete   The list should be printed or written  If you forget or do not have a list of all the medication, then it is acceptable, although less preferred, to bring all the bottles to the appointment     ARRIVE EARLY FOR ALL VISITS:  Please note that we are unable to accommodate late arrivals as per office policy.  YOU-the patient - (NOT a parent, spouse, or friend) must be physically present at check-in no later than 12 minutes after the scheduled appointment time, or you will be asked to reschedule   Consider scheduling a virtual appointment with Dr. Franklin through [email or web address] as an alternative if transportation to the clinic is difficult or unavailable   Please note, however, that virtual visits can only be scheduled after being an established patient of Dr. Franklin. All new appointments  "must be done in-person in clinic  Some insurances will not cover the cost of virtual appointments. Please check with your insurance to find out if these visits are covered    COMMUNICATING URGENT AND NON-URGENT MATTERS:  Your concerns are important and deserve to be heard and addressed. If you have an urgent matter, there are two methods that will ensure your concerns are prioritized appropriately:   Preferred method: Sign-up/Login to your [employer] account and send a message addressed to Dr. Franklin or Margret David (Dr. Franklin's assistant). In the subject line, type \"urgent\" followed by a word or phrase describing the situation (For example, write \"Urgent: Out of antiseuzre med and need refill\" or \"Urgent: Severe side effects to new meds\". In doing this, our staff can ensure urgent messages are triaged appropriately and communicated to Dr. Franklin that day.  Call [facility] main line at [phone]. Dr. Franklin's voicemail extension is [phone]. When leaving a voice message, specifically indicate if it is urgent (or non-urgent) so that the matter can be triaged appropriately and addressed in a timely manner    Thank you for entrusting your neurological care to [facility] and we look forward to continuing to serve you.   "

## 2024-03-20 NOTE — PROGRESS NOTES
"NEUROLOGY FOLLOW-UP - 03/20/2024     REASON FOR VISIT: Fredy Zavala 63 y.o. male presents today for follow-up       SUMMARY RELEVANT PAST MEDICAL HISTORY AND/OR COMPENDIUM OF RELEVANT WORK-UP AND TREATMENTS TO DATE:      INTERVAL HISTORY:    He is having 2-4 seizures per month that his wife is aware of. They tend to cluster. For example he had 3 seizures consecutive days in March. He is compliant with his medicine. Overall he is happy with just being on Xcopri 400 mg QHS. He overall has much greater mental clarity. Mood remains good. He feels that seizures are less intense but wife has not noticed that that is the case. They are about the same length of about 30-60 seconds or so. He tends to hold onto objects instead of dropping things    Labs reviewed.    Brain MRI reviewed and is unchanged from prior. He has L MTS, although right looks like it has MTS as well though not as prominent     CURRENT MEDICATIONS AT THE TIME OF THIS ENCOUNTER:  Current Outpatient Medications on File Prior to Visit   Medication Sig Dispense Refill    atorvastatin (LIPITOR) 40 MG Tab Take 1 Tablet by mouth every evening. 90 Tablet 3    DHA-EPA-Vitamin E (OMEGA-3 COMPLEX PO) Take  by mouth.      terbinafine (LAMISIL) 1 % cream Apply to affected areas on feet nightly 30 g 3    fluticasone-umeclidinium-vilanterol (TRELEGY ELLIPTA) 200-62.5-25 mcg/act inhaler Inhale 1 puff every day. 60 Each 6    Cholecalciferol (VITAMIN D) 2000 UNIT Tab Take 2,000 Units by mouth every day.      albuterol 108 (90 Base) MCG/ACT Aero Soln inhalation aerosol USE 2 INHALATIONS EVERY 6 HOURS AS NEEDED FOR SHORTNESS OF BREATH 8.5 g 10     No current facility-administered medications on file prior to visit.          EXAM:   /62 (BP Location: Right arm, Patient Position: Sitting, BP Cuff Size: Adult)   Pulse (!) 56   Temp 35.9 °C (96.6 °F) (Temporal)   Ht 1.727 m (5' 8\")   Wt 79.9 kg (176 lb 2.4 oz)   SpO2 98%    Wt Readings from Last 5 Encounters: "   03/20/24 79.9 kg (176 lb 2.4 oz)   12/07/23 73.5 kg (162 lb)   11/22/23 74.8 kg (164 lb 14.5 oz)   10/06/23 75 kg (165 lb 5.5 oz)   09/27/23 74.8 kg (165 lb)      Physical Exam:  Physical Exam  Constitutional:       General: He is not in acute distress.  HENT:      Head: Normocephalic.      Nose: Nose normal.      Mouth/Throat:      Pharynx: Uvula swelling present.   Cardiovascular:      Rate and Rhythm: Normal rate.   Neurological:      Coordination: Romberg sign negative.      Deep Tendon Reflexes:      Reflex Scores:       Tricep reflexes are 2+ on the right side and 2+ on the left side.       Bicep reflexes are 2+ on the right side and 2+ on the left side.       Brachioradialis reflexes are 2+ on the right side and 2+ on the left side.       Patellar reflexes are 3+ on the right side and 3+ on the left side.       Achilles reflexes are 2+ on the right side and 2+ on the left side.  Psychiatric:         Speech: Speech normal.        Neurological Exam   Neurological Exam  Mental Status   Oriented only to person, place, time and situation. Recalls 2 of 3 objects immediately. At 3 minutes recalls 2 of 3 objects. Recalls 1 of 3 objects with prompting. Speech is normal. Able to name objects. Follows complex commands. Digit span was 5 forward Fund of knowledge is appropriate for level of education.  Able to name months of years forward and backwards, complex sentence reprtiytion intact, abstract thinking and understanding intact. Speech and thinking is slower and more hesitant than usual.    Motor   No fasciculations present. Normal muscle tone. No abnormal involuntary movements. No pronator drift.    Sensory  Light touch is normal in upper and lower extremities.     Reflexes                                            Right                      Left  Brachioradialis                    2+                         2+  Biceps                                 2+                         2+  Triceps                                 2+                         2+  Patellar                                3+                         3+  Achilles                                2+                         2+    Coordination  Right: Finger-to-nose normal. Rapid alternating movement normal.Left: Finger-to-nose normal. Rapid alternating movement normal.  -finger.    Gait  Casual gait is normal including stance, stride, and arm swing.Normal toe walking. Normal heel walking. Tandem gait abnormality: Romberg is absent.         ASSESSMENT, EDUCATION, AND COUNSELING:  This is a 63 y.o. male patient who presents to the neurology clinic. We had an extensive discussion about the patient's symptoms, signs, and work-up to date, if any. We discussed potential and/or definitive diagnoses, work-up, and potential treatments.     PLAN:  If applicable, the work-up such as labs, imaging, procedures, and/or other testing, referrals, and/or recommended treatment strategies are listed below.    Medications were administered today in clinic (if any):     Visit Diagnoses     ICD-10-CM   1. 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 Partial epilepsy with impairment of consciousness ([facility])  G40.209   3. 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 Chronic obstructive pulmonary disease, unspecified COPD type ([facility])  J44.9   5. Kinetic tremor  G25.2   6. Hypertrophy of uvula  K13.79   7. History of prostate cancer  Z85.46   8. 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 At risk for sleep apnea  Z91.89      Orders Placed This Encounter    Cenobamate (XCOPRI) 200 MG Tab        Patient with intractable focal epilepsy still with occasional focal unaware seizures but overall patient is happy with where things are. He does not want to make any changes to his medicine. Will continue Xcopri monotherapy at 400 mg QHS. Refill sent. F/u in 6 months        BILLING DOCUMENTATION:     The number of minutes of face-to-face time spent in this encounter was No LOS data to  display  . Over 50% of the time of the visit today was spent on counseling and/or coordination of care wtih the patient and/or family, as outlined above in assessment in plan.    Fredy Franklin MD  Department of Neurology at [facility]  Diplomate of the American Board of Psychiatry and Neurology, General Neurology  Diplomate of American Board of Psychiatry and Neurology, a Member Board of the American Board of Medical Subspecialties, Epilepsy  Director of [facility]'s Level III Comprehensive Epilepsy Program  Professor of Clinical Neurology, [facility].   [address], [address]  [facility] [id number] [phone]   Fax: [phone]  E-mail: [email or web address]

## 2024-03-22 PROCEDURE — RXMED WILLOW AMBULATORY MEDICATION CHARGE: Performed by: STUDENT IN AN ORGANIZED HEALTH CARE EDUCATION/TRAINING PROGRAM

## 2024-03-25 ENCOUNTER — TELEPHONE (OUTPATIENT)
Dept: PHARMACY | Facility: MEDICAL CENTER | Age: 64
End: 2024-03-25
Payer: MEDICARE

## 2024-03-25 NOTE — TELEPHONE ENCOUNTER
Contact:             Phone number: [phone]     Name of person spoken with and relationship to patient: ROXI (SPOUSE) GIDEON ANTONY  Medication:   Patient’s Adherence:             How patient is doing on medication: well     How many missed doses and reason: 0     Any new medications: no     Any new conditions: no     Any new allergies: no     Any new side effects: no      Any new diagnoses: no      How many doses remainin DS     Did patient want to speak with pharmacist: no   Delivery:             Delivery date and method:       Needs by Date:      Signature required: no     Any additional details for : YUMIKO Bajwa Appointment Date: YUMIKO   Shipping Address: [address] [place] [id number]   Medication(name, strength and dose): XCOPRI 60/30DS $20   Copay: $20   Payment Method:    Supplies:    Additional Information:   NO FURTHER QUESTIONS OR CONCERNS AT THIS TIME

## 2024-03-26 ENCOUNTER — PHARMACY VISIT (OUTPATIENT)
Dept: PHARMACY | Facility: MEDICAL CENTER | Age: 64
End: 2024-03-26
Payer: COMMERCIAL

## 2024-04-01 SDOH — ECONOMIC STABILITY: FOOD INSECURITY: WITHIN THE PAST 12 MONTHS, YOU WORRIED THAT YOUR FOOD WOULD RUN OUT BEFORE YOU GOT MONEY TO BUY MORE.: NEVER TRUE

## 2024-04-01 SDOH — HEALTH STABILITY: PHYSICAL HEALTH: ON AVERAGE, HOW MANY MINUTES DO YOU ENGAGE IN EXERCISE AT THIS LEVEL?: 30 MIN

## 2024-04-01 SDOH — HEALTH STABILITY: PHYSICAL HEALTH: ON AVERAGE, HOW MANY DAYS PER WEEK DO YOU ENGAGE IN MODERATE TO STRENUOUS EXERCISE (LIKE A BRISK WALK)?: 7 DAYS

## 2024-04-01 SDOH — ECONOMIC STABILITY: FOOD INSECURITY: WITHIN THE PAST 12 MONTHS, THE FOOD YOU BOUGHT JUST DIDN'T LAST AND YOU DIDN'T HAVE MONEY TO GET MORE.: NEVER TRUE

## 2024-04-01 SDOH — ECONOMIC STABILITY: INCOME INSECURITY: IN THE LAST 12 MONTHS, WAS THERE A TIME WHEN YOU WERE NOT ABLE TO PAY THE MORTGAGE OR RENT ON TIME?: NO

## 2024-04-01 SDOH — ECONOMIC STABILITY: INCOME INSECURITY: HOW HARD IS IT FOR YOU TO PAY FOR THE VERY BASICS LIKE FOOD, HOUSING, MEDICAL CARE, AND HEATING?: NOT HARD AT ALL

## 2024-04-01 SDOH — ECONOMIC STABILITY: HOUSING INSECURITY

## 2024-04-01 ASSESSMENT — SOCIAL DETERMINANTS OF HEALTH (SDOH)
IN A TYPICAL WEEK, HOW MANY TIMES DO YOU TALK ON THE PHONE WITH FAMILY, FRIENDS, OR NEIGHBORS?: ONCE A WEEK
DO YOU BELONG TO ANY CLUBS OR ORGANIZATIONS SUCH AS CHURCH GROUPS UNIONS, FRATERNAL OR ATHLETIC GROUPS, OR SCHOOL GROUPS?: NO
HOW OFTEN DO YOU HAVE SIX OR MORE DRINKS ON ONE OCCASION: NEVER
HOW OFTEN DO YOU ATTENT MEETINGS OF THE CLUB OR ORGANIZATION YOU BELONG TO?: NEVER
HOW OFTEN DO YOU GET TOGETHER WITH FRIENDS OR RELATIVES?: ONCE A WEEK
HOW OFTEN DO YOU ATTENT MEETINGS OF THE CLUB OR ORGANIZATION YOU BELONG TO?: NEVER
IN A TYPICAL WEEK, HOW MANY TIMES DO YOU TALK ON THE PHONE WITH FAMILY, FRIENDS, OR NEIGHBORS?: ONCE A WEEK
HOW HARD IS IT FOR YOU TO PAY FOR THE VERY BASICS LIKE FOOD, HOUSING, MEDICAL CARE, AND HEATING?: NOT HARD AT ALL
HOW OFTEN DO YOU GET TOGETHER WITH FRIENDS OR RELATIVES?: ONCE A WEEK
HOW OFTEN DO YOU HAVE A DRINK CONTAINING ALCOHOL: NEVER
HOW OFTEN DO YOU ATTEND CHURCH OR RELIGIOUS SERVICES?: 1 TO 4 TIMES PER YEAR
HOW MANY DRINKS CONTAINING ALCOHOL DO YOU HAVE ON A TYPICAL DAY WHEN YOU ARE DRINKING: PATIENT DOES NOT DRINK
DO YOU BELONG TO ANY CLUBS OR ORGANIZATIONS SUCH AS CHURCH GROUPS UNIONS, FRATERNAL OR ATHLETIC GROUPS, OR SCHOOL GROUPS?: NO
HOW OFTEN DO YOU ATTEND CHURCH OR RELIGIOUS SERVICES?: 1 TO 4 TIMES PER YEAR
WITHIN THE PAST 12 MONTHS, YOU WORRIED THAT YOUR FOOD WOULD RUN OUT BEFORE YOU GOT THE MONEY TO BUY MORE: NEVER TRUE

## 2024-04-01 ASSESSMENT — LIFESTYLE VARIABLES
SKIP TO QUESTIONS 9-10: 1
HOW OFTEN DO YOU HAVE SIX OR MORE DRINKS ON ONE OCCASION: NEVER
HOW MANY STANDARD DRINKS CONTAINING ALCOHOL DO YOU HAVE ON A TYPICAL DAY: PATIENT DOES NOT DRINK
AUDIT-C TOTAL SCORE: 0
HOW OFTEN DO YOU HAVE A DRINK CONTAINING ALCOHOL: NEVER

## 2024-04-04 ENCOUNTER — APPOINTMENT (OUTPATIENT)
Dept: MEDICAL GROUP | Facility: IMAGING CENTER | Age: 64
End: 2024-04-04
Payer: MEDICARE

## 2024-04-04 VITALS
DIASTOLIC BLOOD PRESSURE: 60 MMHG | OXYGEN SATURATION: 100 % | TEMPERATURE: 97.8 F | BODY MASS INDEX: 24.4 KG/M2 | HEIGHT: 68 IN | SYSTOLIC BLOOD PRESSURE: 118 MMHG | RESPIRATION RATE: 16 BRPM | WEIGHT: 161 LBS | HEART RATE: 55 BPM

## 2024-04-04 DIAGNOSIS — E53.8 LOW SERUM VITAMIN B12: ICD-10-CM

## 2024-04-04 DIAGNOSIS — J44.9 CHRONIC OBSTRUCTIVE PULMONARY DISEASE, UNSPECIFIED COPD TYPE (HCC): ICD-10-CM

## 2024-04-04 DIAGNOSIS — R71.8 ABNORMAL RBC: ICD-10-CM

## 2024-04-04 DIAGNOSIS — E78.5 DYSLIPIDEMIA: ICD-10-CM

## 2024-04-04 DIAGNOSIS — Z85.46 HISTORY OF PROSTATE CANCER: ICD-10-CM

## 2024-04-04 DIAGNOSIS — R73.9 HYPERGLYCEMIA: ICD-10-CM

## 2024-04-04 DIAGNOSIS — G47.30 SLEEP APNEA, UNSPECIFIED TYPE: ICD-10-CM

## 2024-04-04 DIAGNOSIS — Z00.00 ENCOUNTER FOR ANNUAL WELLNESS EXAM IN MEDICARE PATIENT: ICD-10-CM

## 2024-04-04 DIAGNOSIS — G40.219 INTRACTABLE FOCAL EPILEPSY WITH IMPAIRMENT OF CONSCIOUSNESS (HCC): Chronic | ICD-10-CM

## 2024-04-04 LAB
HBA1C MFR BLD: 5.5 % (ref ?–5.8)
POCT INT CON NEG: NEGATIVE
POCT INT CON POS: POSITIVE

## 2024-04-04 PROCEDURE — 1126F AMNT PAIN NOTED NONE PRSNT: CPT | Performed by: PHYSICIAN ASSISTANT

## 2024-04-04 PROCEDURE — 3074F SYST BP LT 130 MM HG: CPT | Performed by: PHYSICIAN ASSISTANT

## 2024-04-04 PROCEDURE — G0439 PPPS, SUBSEQ VISIT: HCPCS | Performed by: PHYSICIAN ASSISTANT

## 2024-04-04 PROCEDURE — 3078F DIAST BP <80 MM HG: CPT | Performed by: PHYSICIAN ASSISTANT

## 2024-04-04 PROCEDURE — RXMED WILLOW AMBULATORY MEDICATION CHARGE: Performed by: PHYSICIAN ASSISTANT

## 2024-04-04 PROCEDURE — 83036 HEMOGLOBIN GLYCOSYLATED A1C: CPT | Performed by: PHYSICIAN ASSISTANT

## 2024-04-04 RX ORDER — ATORVASTATIN CALCIUM 40 MG/1
40 TABLET, FILM COATED ORAL NIGHTLY
Qty: 90 TABLET | Refills: 3 | Status: SHIPPED | OUTPATIENT
Start: 2024-04-04

## 2024-04-04 RX ORDER — ALBUTEROL SULFATE 90 UG/1
AEROSOL, METERED RESPIRATORY (INHALATION)
Qty: 6.7 G | Refills: 3 | Status: SHIPPED | OUTPATIENT
Start: 2024-04-04

## 2024-04-04 RX ORDER — FLUTICASONE FUROATE, UMECLIDINIUM BROMIDE AND VILANTEROL TRIFENATATE 200; 62.5; 25 UG/1; UG/1; UG/1
1 POWDER RESPIRATORY (INHALATION) DAILY
Qty: 180 EACH | Refills: 3 | Status: SHIPPED | OUTPATIENT
Start: 2024-04-04

## 2024-04-04 ASSESSMENT — FIBROSIS 4 INDEX: FIB4 SCORE: 0.99

## 2024-04-04 ASSESSMENT — ACTIVITIES OF DAILY LIVING (ADL): BATHING_REQUIRES_ASSISTANCE: 0

## 2024-04-04 ASSESSMENT — PATIENT HEALTH QUESTIONNAIRE - PHQ9: CLINICAL INTERPRETATION OF PHQ2 SCORE: 0

## 2024-04-04 ASSESSMENT — ENCOUNTER SYMPTOMS: GENERAL WELL-BEING: GOOD

## 2024-04-04 ASSESSMENT — PAIN SCALES - GENERAL: PAINLEVEL: NO PAIN

## 2024-04-04 NOTE — PROGRESS NOTES
Chief Complaint   Patient presents with    Annual Exam       HPI:  Fredy Zavala is a 63 y.o. 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 90 Tablet 3    fluticasone-umeclidinium-vilanterol (TRELEGY ELLIPTA) 200-62.5-25 mcg/act inhaler Inhale 1 puff every day. 180 Each 3    Cenobamate (XCOPRI) 200 MG Tab Take 2 tablets (400 mg) by mouth at bedtime 60 Tablet 5    DHA-EPA-Vitamin E (OMEGA-3 COMPLEX PO) Take  by mouth.      terbinafine (LAMISIL) 1 % cream Apply to affected areas on feet nightly 30 g 3    Cholecalciferol (VITAMIN D) 2000 UNIT Tab Take 2,000 Units by mouth every day.       No current facility-administered medications for this visit.          Current supplements as per medication list.     Allergies: Patient has no known allergies.    Current social contact/activities: none      He  reports that he has never smoked. He has never used smokeless tobacco. He reports that he does not currently use alcohol. He reports that he does not use drugs.  Counseling given: Not Answered      ROS:    Gait: Uses no assistive device  Ostomy: No  Other tubes: No  Amputations: No  Chronic oxygen use: No  Last eye exam: 10 years ago   Wears hearing aids: No   : Denies any urinary leakage  during the last 6 months    Screening:  Depression Screening  Little interest or pleasure in doing things?  0 - not at all  Feeling down, depressed , or hopeless? 0 - not at all  Trouble falling or staying asleep, or sleeping too much?     Feeling tired or having little energy?     Poor appetite or overeating?     Feeling bad about yourself - or that you are a failure or have let yourself or your family down?    Trouble concentrating on things, such as reading the newspaper or watching television?    Moving or speaking so slowly that other people could have noticed.  Or the opposite - being so fidgety or restless that you have been moving around a lot more than usual?     Thoughts that you would be better off dead, or of hurting yourself?     Patient Health Questionnaire Score:      If depressive symptoms identified deferred to follow up visit unless specifically addressed in assessment and plan.    Interpretation of PHQ-9 Total Score   Score Severity   1-4 No Depression   5-9 Mild Depression   10-14 Moderate Depression   15-19 Moderately Severe Depression   20-27 Severe Depression    Screening for Cognitive Impairment  Do you or any of your friends or family members have any concern about your memory? No  Three Minute Recall (Leader, Season, Table) 3/3    Rj clock face with all 12 numbers and set the hands to show 10 minutes after 11.  Yes    Cognitive concerns identified deferred for follow up unless specifically addressed in assessment and plan.    Fall Risk Assessment  Has the patient had two or more falls in the last year or any fall with injury in the last year?  No    Safety Assessment  Do you always wear your seatbelt?  No  Any changes to home needed to function safely? No  Difficulty hearing.  No  Patient counseled about all safety risks that were identified.    Functional Assessment ADLs  Are there any barriers preventing you from cooking for yourself or meeting nutritional needs?  No.    Are there any  barriers preventing you from driving safely or obtaining transportation?  No.    Are there any barriers preventing you from using a telephone or calling for help?  No    Are there any barriers preventing you from shopping?  No.    Are there any barriers preventing you from taking care of your own finances?  No    Are there any barriers preventing you from managing your medications?  No    Are there any barriers preventing you from showering, bathing or dressing yourself? No    Are there any barriers preventing you from doing housework or laundry? No    Are there any barriers preventing you from using the toilet?No    Are you currently engaging in any exercise or physical activity?  Yes. [employer] bike, yard work     Self-Assessment of Health  What is your perception of your health? Good    Do you sleep more than six hours a night? Yes    In the past 7 days, how much did pain keep you from doing your normal work? None    Do you spend quality time with family or friends (virtually or in person)? No    Do you usually eat a heart healthy diet that constists of a variety of fruits, vegetables, whole grains and fiber? Yes    Do you eat foods high in fat and/or Fast Food more than three times per week? No    How concerned are you that your medical conditions are not being well managed? Not at all    Are you worried that in the next 2 months, you may not have stable housing that you own, rent, or stay in as part of a household? No        Advance Care Planning  Do you have an Advance Directive, Living Will, Durable Power of , or POLST? No                 Health Maintenance Summary            Overdue - COVID-19 Vaccine (5 - 2023-24 season) Overdue since 9/1/2023      10/13/2022  Imm Admin: PFIZER BIVALENT SARS-COV-2 VACCINE (12+)    12/07/2021  Imm Admin: MODERNA SARS-COV-2 VACCINE (12+)    04/16/2021  Imm Admin: PFIZER PURPLE CAP SARS-COV-2 VACCINATION (12+)    03/20/2021  Imm Admin: PFIZER PURPLE CAP SARS-COV-2  VACCINATION (12+)              Postponed - Annual Pulmonary Function Test / Spirometry (Yearly) Postponed until 9/27/2024 09/24/2022  PFT DICTATED RESULTS    02/06/2009  PFT DICTATED RESULTS              IMM DTaP/Tdap/Td Vaccine (2 - Td or Tdap) Next due on 7/2/2024 07/02/2014  Imm Admin: Tdap Vaccine              Annual Wellness Visit (Yearly) Next due on 4/4/2025 04/04/2024  Done    03/31/2023  Level of Service: IN INITIAL ANNUAL WELLNESS VISIT-INCLUDES PPPS    03/31/2023  Visit Dx: Medicare annual wellness visit, initial    09/09/2021  Level of Service: IN PREVENTIVE VISIT,EST,65 & OVER              Colorectal Cancer Screening (Colonoscopy - Every 5 Years) Next due on 1/4/2028 01/04/2023  AMB EXTERNAL COLONOSCOPY RESULTS    12/04/2012  Colonoscopy (Previously completed - 8/10)              Hepatitis C Screening  Tentatively Complete      08/13/2020  Hepatitis C Antibody component of HEP C VIRUS ANTIBODY              Pneumococcal Vaccine: 0-64 Years (Series Information) Completed      11/01/2022  Imm Admin: Pneumococcal polysaccharide vaccine (PPSV-23)    09/21/2022  Imm Admin: Pneumococcal Conjugate Vaccine (PCV20)    02/28/2019  Imm Admin: Pneumococcal Conjugate Vaccine (Prevnar/PCV-13)              Zoster (Shingles) Vaccines (Series Information) Completed      03/17/2023  Imm Admin: Zoster Vaccine Recombinant (RZV) (SHINGRIX)    11/01/2022  Imm Admin: Zoster Vaccine Live (ZVL) (Zostavax) - HISTORICAL DATA    10/13/2022  Imm Admin: Zoster Vaccine Recombinant (RZV) (SHINGRIX)              Influenza Vaccine (Series Information) Completed      09/27/2023  Imm Admin: Influenza Vaccine Quad Inj (Pf)    01/08/2022  Imm Admin: Influenza Vaccine Quad Inj (Pf)    11/22/2019  Imm Admin: Influenza Vaccine Quad Inj (Pf)    11/01/2016  Imm Admin: Influenza Vaccine Quad Inj (Preserved)    12/22/2015  Imm Admin: Influenza Vaccine Quad Inj (Preserved)    Only the first 5 history entries have been loaded, but  more history exists.              Hepatitis A Vaccine (Hep A) (Series Information) Aged Out      No completion history exists for this topic.              Hepatitis B Vaccine (Hep B) (Series Information) Aged Out      No completion history exists for this topic.              HPV Vaccines (Series Information) Aged Out      No completion history exists for this topic.              Polio Vaccine (Inactivated Polio) (Series Information) Aged Out      No completion history exists for this topic.              Meningococcal Immunization (Series Information) Aged Out      No completion history exists for this topic.              Discontinued - HIV Screening  Discontinued      No completion history exists for this topic.                    Patient Care Team:  Colleen Merlos P.A.-C. as PCP - General (Physician Assistant)  Fredy Franklin M.D. as Attending Team Physician (Neurology)      Social History     Tobacco Use    Smoking status: Never    Smokeless tobacco: Never   Vaping Use    Vaping Use: Never used   Substance Use Topics    Alcohol use: Not Currently     Comment: stopped drinking 5 years ago    Drug use: Never     Comment: previous marijuana     Family History   Problem Relation Age of Onset    Alcohol abuse Mother         cirrhosis    Alcohol abuse Father     Psychiatric Illness Father     Diabetes Father     Alcohol/Drug Brother     Cancer Neg Hx     Stroke Neg Hx     Heart Disease Neg Hx      He  has a past medical history of Asthma, Cancer (HCC) (05/2012), COPD (chronic obstructive pulmonary disease) (HCC), Dyslipidemia, and Partial epilepsy with impairment of consciousness (HCC).   Past Surgical History:   Procedure Laterality Date    PROSTATECTOMY ROBOTIC  05/29/2012    Performed by JACOB RAMOS at SUR[facility] ORS    COLONOSCOPY  08/01/2010    grade 1 int. hemorrhoids    PROSTATECTOMY, RADICAL RETRO         Exam:   /60 (BP Location: Right arm, Patient Position: Sitting, BP Cuff Size: Adult)  "  Pulse (!) 55   Temp 36.6 °C (97.8 °F) (Temporal)   Resp 16   Ht 1.727 m (5' 8\")   Wt 73 kg (161 lb)   SpO2 100%  Body mass index is 24.48 kg/m².    Hearing excellent.    Dentition fair  Alert, oriented in no acute distress.  Eye contact is good, speech goal directed, affect calm    Assessment and Plan. The following treatment and monitoring plan is recommended:      1. Encounter for annual wellness exam in Medicare patient  PMH/PSH/FH/Social history reviewed.  Vaccinations discussed.  Previous records and labs reviewed. Discussed age appropriate anticipatory guidance.  Due for Tdap and RSV.  Patient will receive at the pharmacy. POLST form given to patient to take home and review, then return to office to scan in chart.    2. Chronic obstructive pulmonary disease, unspecified COPD type (HCC)  Chronic, controlled and stable. Continue current regimen -patient declining PFT.  Chest x-ray reviewed.  - albuterol 108 (90 Base) MCG/ACT Aero Soln inhalation aerosol; USE 2 INHALATIONS EVERY 6 HOURS AS NEEDED FOR SHORTNESS OF BREATH  Dispense: 6.7 g; Refill: 3  - fluticasone-umeclidinium-vilanterol (TRELEGY ELLIPTA) 200-62.5-25 mcg/act inhaler; Inhale 1 puff every day.  Dispense: 180 Each; Refill: 3    3. 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  Follow-up as scheduled.  Previous notes reviewed.    4. Dyslipidemia  Chronic, controlled and stable. Continue current regimen -repeat lipid profile, if LDL greater than 100, we will switch atorvastatin to rosuvastatin.  - atorvastatin (LIPITOR) 40 MG Tab; Take 1 Tablet by mouth every evening.  Dispense: 90 Tablet; Refill: 3  - Lipid Profile; Future    5. Hyperglycemia  - POCT  A1C    6. Abnormal RBC  - VITAMIN B12; Future  - CBC WITH DIFFERENTIAL; Future  - FOLATE; Future  - IRON/TOTAL IRON BIND; Future  - FERRITIN; Future    7. Low serum vitamin B12  - VITAMIN B12; Future  - CBC WITH DIFFERENTIAL; Future  - FOLATE; " Future  - IRON/TOTAL IRON BIND; Future  - FERRITIN; Future    8. History of prostate cancer  Status post prostatectomy, PSA up-to-date and undetectable.    9. Sleep apnea, unspecified type  Patient declining CPAP, no longer following up with sleep specialist.      Services suggested: No services needed at this time  Health Care Screening: Age-appropriate preventive services recommended by USPTF and ACIP covered by Medicare were discussed today. Services ordered if indicated and agreed upon by the patient.  Referrals offered: Community-based lifestyle interventions to reduce health risks and promote self-management and wellness, fall prevention, nutrition, physical activity, tobacco-use cessation, weight loss, and mental health services as per orders if indicated.    Discussion today about general wellness and lifestyle habits:    Prevent falls and reduce trip hazards; Cautioned about securing or removing rugs.  Have a working fire alarm and carbon monoxide detector;   Engage in regular physical activity and social activities     Follow-up: Return in about 6 months (around 10/4/2024) for Medication recheck.

## 2024-04-04 NOTE — PATIENT INSTRUCTIONS
It was a pleasure meeting with you today at [facility]!    Your medical history/records and medications were reviewed today.     UPDATE on MyChart Results: If you have blood work, and/or imaging studies, or any other test or procedure completed, you will have access to results as soon as they become available in MyChart. Recently, these results will be available for review at the same time that your provider is able to see results!    This will likely mean you will see a result before your provider has had a chance to review and discuss with you.  Some results or care notes may be hard to understand and may be serious in nature.    We look at every result and your provider will contact you to explain what they mean and discuss appropriate next steps. Please allow for at least 72 business hours for chart and result review.     We prefer that you wait for your care team to contact you with your results.  Often, your provider will discuss your results with you at your next appointment. We look forward to continuing to partner with you in your care.    Please review my practice information below:    If you have any prescription refill requests, please send them via [employer] or discuss with your provider at the start of your office visits. Please allow 3-5 business days for lab and testing review and you will be contacted via [employer] with those results, or if advised to make a follow up appointment regarding those results, then please do so.     Once resulted, your lab/test/imaging results will show up automatically in your MyChart. Please wait for my interpretation and recommendations prior to viewing your results to avoid any unnecessary confusion or misinterpretation. I will address all of the lab values that I interpret as abnormal and message you accordingly on your MyChart. I will always send you a message about your results even if they are normal. If you do not hear back from me within 5-7 business  days after completing your tests, then please send me a message on [employer] so I can obtain your results (especially if you went to an outside lab or imaging center - [facility], [facility], etc).     If you have any additional questions or concerns beyond my interpretation of your results, please make an appointment with me to discuss in further detail.    Please only use the [employer] messaging system for questions regarding your most recent appointment or if advised to use otherwise (glucose or blood pressure reporting).     If you have any new problems or concerns, you must make an appointment to discuss. This includes any referral requests, lab requests (unless advised to notify me for pre-appt labs), medication side effects, or request for medication adjustments.     Please arrive 15 minutes prior to your appointment time to complete your check-in and intake with the medical assistant.      Thank you,    Colleen Merlos PA-C ([place])  Physician Assistant Certified  [facility]    -----------------------------------------------------------------    Attn: Patients of [facility]:    In an effort to continue to provide excellent and efficient care to our patients, it is vital that we continue to use our resources appropriately. With that, this is a reminder that [employer] is used for prescription refill requests, test results, virtual visits, and chart review only.     Any new questions, concerns/conditions, lab/imaging requests, medication adjustments, new prescriptions, or referral requests do require an appointment (virtually or in person), unless discussed otherwise at your most recent appointment.     Thank you for your understanding,    [facility]

## 2024-04-05 ENCOUNTER — PATIENT MESSAGE (OUTPATIENT)
Dept: NEUROLOGY | Facility: MEDICAL CENTER | Age: 64
End: 2024-04-05
Payer: MEDICARE

## 2024-04-05 ENCOUNTER — PHARMACY VISIT (OUTPATIENT)
Dept: PHARMACY | Facility: MEDICAL CENTER | Age: 64
End: 2024-04-05
Payer: COMMERCIAL

## 2024-04-05 PROCEDURE — RXMED WILLOW AMBULATORY MEDICATION CHARGE: Performed by: PHYSICIAN ASSISTANT

## 2024-04-16 ENCOUNTER — TELEPHONE (OUTPATIENT)
Dept: PHARMACY | Facility: MEDICAL CENTER | Age: 64
End: 2024-04-16
Payer: MEDICARE

## 2024-04-16 PROCEDURE — RXMED WILLOW AMBULATORY MEDICATION CHARGE: Performed by: STUDENT IN AN ORGANIZED HEALTH CARE EDUCATION/TRAINING PROGRAM

## 2024-04-16 NOTE — TELEPHONE ENCOUNTER
Contact:             Phone number: [phone]     Name of person spoken with and relationship to patient: ROXI (SPOUSE) GIDEON ANTONY  Medication:   Patient’s Adherence:             How patient is doing on medication: well     How many missed doses and reason: 0     Any new medications: no     Any new conditions: no     Any new allergies: no     Any new side effects: no      Any new diagnoses: no      How many doses remainin DS (WAS NOT SURE, NOT HOME)     Did patient want to speak with pharmacist: no   Delivery:             Delivery date and method:       Needs by Date:     Signature required: no     Any additional details for : YUMIKO Bajwa Appointment Date: YUMIKO   Shipping Address: [address] [place] [address]   Medication(name, strength and dose): XCOPRI 60/30DS $20   Copay: $20   Payment Method:    Supplies:    Additional Information:   NO FURTHER QUESTIONS OR CONCERNS AT THIS TIME

## 2024-04-19 ENCOUNTER — PHARMACY VISIT (OUTPATIENT)
Dept: PHARMACY | Facility: MEDICAL CENTER | Age: 64
End: 2024-04-19
Payer: COMMERCIAL

## 2024-05-10 ENCOUNTER — TELEPHONE (OUTPATIENT)
Dept: PHARMACY | Facility: MEDICAL CENTER | Age: 64
End: 2024-05-10
Payer: MEDICARE

## 2024-05-10 PROCEDURE — RXMED WILLOW AMBULATORY MEDICATION CHARGE: Performed by: STUDENT IN AN ORGANIZED HEALTH CARE EDUCATION/TRAINING PROGRAM

## 2024-05-11 NOTE — TELEPHONE ENCOUNTER
Contact:             Phone number: [phone]     Name of person spoken with and relationship to patient: ROXI (SPOUSE) GIDEON ANTONY  Medication:   Patient’s Adherence:             How patient is doing on medication: well     How many missed doses and reason: 0     Any new medications: no     Any new conditions: no     Any new allergies: no     Any new side effects: no      Any new diagnoses: no      How many doses remainin-10 DS (WAS NOT SURE, NOT HOME)     Did patient want to speak with pharmacist: no   Delivery:             Delivery date and method: 05/15      Needs by Date:     Signature required: no     Any additional details for : YUMIKO Bajwa Appointment Date: YUMIKO   Shipping Address: [address] [place] [address]   Medication(name, strength and dose): XCOPRI 60/30DS $20   Copay: $20   Payment Method:     Supplies:    Additional Information:   NO FURTHER QUESTIONS OR CONCERNS AT THIS TIME

## 2024-05-13 PROCEDURE — RXMED WILLOW AMBULATORY MEDICATION CHARGE: Performed by: PHYSICIAN ASSISTANT

## 2024-05-13 PROCEDURE — RXMED WILLOW AMBULATORY MEDICATION CHARGE: Performed by: REGISTERED NURSE

## 2024-05-14 ENCOUNTER — HOSPITAL ENCOUNTER (OUTPATIENT)
Dept: LAB | Facility: MEDICAL CENTER | Age: 64
End: 2024-05-14
Attending: PHYSICIAN ASSISTANT
Payer: MEDICARE

## 2024-05-14 DIAGNOSIS — R71.8 ABNORMAL RBC: ICD-10-CM

## 2024-05-14 DIAGNOSIS — E78.5 DYSLIPIDEMIA: ICD-10-CM

## 2024-05-14 DIAGNOSIS — E53.8 LOW SERUM VITAMIN B12: ICD-10-CM

## 2024-05-14 LAB
BASOPHILS # BLD AUTO: 0.5 % (ref 0–1.8)
BASOPHILS # BLD: 0.02 K/UL (ref 0–0.12)
CHOLEST SERPL-MCNC: 189 MG/DL (ref 100–199)
EOSINOPHIL # BLD AUTO: 0.15 K/UL (ref 0–0.51)
EOSINOPHIL NFR BLD: 3.6 % (ref 0–6.9)
ERYTHROCYTE [DISTWIDTH] IN BLOOD BY AUTOMATED COUNT: 48.6 FL (ref 35.9–50)
FERRITIN SERPL-MCNC: 191 NG/ML (ref 22–322)
FOLATE SERPL-MCNC: 6.2 NG/ML
HCT VFR BLD AUTO: 43.7 % (ref 42–52)
HDLC SERPL-MCNC: 50 MG/DL
HGB BLD-MCNC: 14.6 G/DL (ref 14–18)
IMM GRANULOCYTES # BLD AUTO: 0 K/UL (ref 0–0.11)
IMM GRANULOCYTES NFR BLD AUTO: 0 % (ref 0–0.9)
IRON SATN MFR SERPL: 54 % (ref 15–55)
IRON SERPL-MCNC: 149 UG/DL (ref 50–180)
LDLC SERPL CALC-MCNC: 119 MG/DL
LYMPHOCYTES # BLD AUTO: 1.68 K/UL (ref 1–4.8)
LYMPHOCYTES NFR BLD: 40.7 % (ref 22–41)
MCH RBC QN AUTO: 31.9 PG (ref 27–33)
MCHC RBC AUTO-ENTMCNC: 33.4 G/DL (ref 32.3–36.5)
MCV RBC AUTO: 95.6 FL (ref 81.4–97.8)
MONOCYTES # BLD AUTO: 0.42 K/UL (ref 0–0.85)
MONOCYTES NFR BLD AUTO: 10.2 % (ref 0–13.4)
NEUTROPHILS # BLD AUTO: 1.86 K/UL (ref 1.82–7.42)
NEUTROPHILS NFR BLD: 45 % (ref 44–72)
NRBC # BLD AUTO: 0 K/UL
NRBC BLD-RTO: 0 /100 WBC (ref 0–0.2)
PLATELET # BLD AUTO: 264 K/UL (ref 164–446)
PMV BLD AUTO: 10 FL (ref 9–12.9)
RBC # BLD AUTO: 4.57 M/UL (ref 4.7–6.1)
TIBC SERPL-MCNC: 277 UG/DL (ref 250–450)
TRIGL SERPL-MCNC: 102 MG/DL (ref 0–149)
UIBC SERPL-MCNC: 128 UG/DL (ref 110–370)
VIT B12 SERPL-MCNC: 653 PG/ML (ref 211–911)
WBC # BLD AUTO: 4.1 K/UL (ref 4.8–10.8)

## 2024-05-17 ENCOUNTER — PHARMACY VISIT (OUTPATIENT)
Dept: PHARMACY | Facility: MEDICAL CENTER | Age: 64
End: 2024-05-17
Payer: COMMERCIAL

## 2024-06-10 ENCOUNTER — TELEPHONE (OUTPATIENT)
Dept: PHARMACY | Facility: MEDICAL CENTER | Age: 64
End: 2024-06-10
Payer: MEDICARE

## 2024-06-10 PROCEDURE — RXMED WILLOW AMBULATORY MEDICATION CHARGE: Performed by: STUDENT IN AN ORGANIZED HEALTH CARE EDUCATION/TRAINING PROGRAM

## 2024-06-11 NOTE — TELEPHONE ENCOUNTER
Contact:             Phone number: [phone]     Name of person spoken with and relationship to patient: ROXI (SPOUSE) GIDEON ANTONY  Medication:   Patient’s Adherence:             How patient is doing on medication: well     How many missed doses and reason: 0     Any new medications: no     Any new conditions: no     Any new allergies: no     Any new side effects: no      Any new diagnoses: no      How many doses remainin DS      Did patient want to speak with pharmacist: no   Delivery:             Delivery date and method:       Needs by Date:     Signature required: no     Any additional details for : YUMIKO Bajwa Appointment Date: YUMIKO   Shipping Address: [address] [place] [address]   Medication(name, strength and dose): XCOPRI 60/30DS $20   Copay: $20   Payment Method:     Supplies:    Additional Information:   NO FURTHER QUESTIONS OR CONCERNS AT THIS TIME

## 2024-06-14 ENCOUNTER — PHARMACY VISIT (OUTPATIENT)
Dept: PHARMACY | Facility: MEDICAL CENTER | Age: 64
End: 2024-06-14
Payer: COMMERCIAL

## 2024-07-01 PROCEDURE — RXMED WILLOW AMBULATORY MEDICATION CHARGE: Performed by: PHYSICIAN ASSISTANT

## 2024-07-02 ENCOUNTER — PHARMACY VISIT (OUTPATIENT)
Dept: PHARMACY | Facility: MEDICAL CENTER | Age: 64
End: 2024-07-02
Payer: COMMERCIAL

## 2024-07-05 PROCEDURE — RXMED WILLOW AMBULATORY MEDICATION CHARGE: Performed by: STUDENT IN AN ORGANIZED HEALTH CARE EDUCATION/TRAINING PROGRAM

## 2024-07-10 ENCOUNTER — PHARMACY VISIT (OUTPATIENT)
Dept: PHARMACY | Facility: MEDICAL CENTER | Age: 64
End: 2024-07-10
Payer: COMMERCIAL

## 2024-07-12 ENCOUNTER — TELEPHONE (OUTPATIENT)
Dept: PHARMACY | Facility: MEDICAL CENTER | Age: 64
End: 2024-07-12
Payer: MEDICARE

## 2024-08-08 PROCEDURE — RXMED WILLOW AMBULATORY MEDICATION CHARGE: Performed by: STUDENT IN AN ORGANIZED HEALTH CARE EDUCATION/TRAINING PROGRAM

## 2024-08-13 ENCOUNTER — TELEPHONE (OUTPATIENT)
Dept: MEDICAL GROUP | Facility: IMAGING CENTER | Age: 64
End: 2024-08-13
Payer: MEDICARE

## 2024-08-13 NOTE — TELEPHONE ENCOUNTER
[facility] to reschedule a patient's appointment on 10/07/2024 with Colleen Merlos. Colleen will be out-of-office at that time. Patient was instructed to call [phone] or use [employer] application to reschedule at their earliest convenience.

## 2024-08-14 ENCOUNTER — PHARMACY VISIT (OUTPATIENT)
Dept: PHARMACY | Facility: MEDICAL CENTER | Age: 64
End: 2024-08-14
Payer: COMMERCIAL

## 2024-08-15 ENCOUNTER — PHARMACY VISIT (OUTPATIENT)
Dept: PHARMACY | Facility: MEDICAL CENTER | Age: 64
End: 2024-08-15
Payer: COMMERCIAL

## 2024-08-15 PROCEDURE — RXMED WILLOW AMBULATORY MEDICATION CHARGE: Performed by: INTERNAL MEDICINE

## 2024-09-02 DIAGNOSIS — G40.209 PARTIAL EPILEPSY WITH IMPAIRMENT OF CONSCIOUSNESS (HCC): ICD-10-CM

## 2024-09-02 DIAGNOSIS — G40.219 INTRACTABLE FOCAL EPILEPSY WITH IMPAIRMENT OF CONSCIOUSNESS (HCC): ICD-10-CM

## 2024-09-03 NOTE — TELEPHONE ENCOUNTER
Received request via: Patient    Medication Name/Dosage Cenobamate (XCOPRI) 200 MG Tab     When was medication last prescribed 03/20/2024    How many refills were previously provided 5    How many Refills does he patient have left from last prescription 0    Was the patient seen in the last year in this department? Yes   Date of last office visit 03/20/2024     Per last Neurology Office Visit, when was the date of next follow up visit set for?                            Date of office visit follow up request 09/ 20/2024    Does the patient have an upcoming appointment? Yes   If yes, when 09/18/2024             If no, schedule appointment 0    Does the patient have MCFP Plus and need 100 day supply (blood pressure, diabetes and cholesterol meds only)? Patient does not have SCP

## 2024-09-04 RX ORDER — CENOBAMATE 200 MG/1
400 TABLET, FILM COATED ORAL
Qty: 60 TABLET | Refills: 5 | Status: SHIPPED | OUTPATIENT
Start: 2024-09-04 | End: 2025-03-03

## 2024-09-12 PROCEDURE — RXMED WILLOW AMBULATORY MEDICATION CHARGE: Performed by: STUDENT IN AN ORGANIZED HEALTH CARE EDUCATION/TRAINING PROGRAM

## 2024-09-16 ENCOUNTER — PHARMACY VISIT (OUTPATIENT)
Dept: PHARMACY | Facility: MEDICAL CENTER | Age: 64
End: 2024-09-16
Payer: COMMERCIAL

## 2024-09-18 ENCOUNTER — OFFICE VISIT (OUTPATIENT)
Dept: NEUROLOGY | Facility: MEDICAL CENTER | Age: 64
End: 2024-09-18
Attending: STUDENT IN AN ORGANIZED HEALTH CARE EDUCATION/TRAINING PROGRAM
Payer: MEDICARE

## 2024-09-18 VITALS
HEART RATE: 57 BPM | SYSTOLIC BLOOD PRESSURE: 122 MMHG | OXYGEN SATURATION: 97 % | WEIGHT: 168.43 LBS | BODY MASS INDEX: 25.53 KG/M2 | DIASTOLIC BLOOD PRESSURE: 60 MMHG | HEIGHT: 68 IN | TEMPERATURE: 96.5 F

## 2024-09-18 DIAGNOSIS — K13.79 HYPERTROPHY OF UVULA: ICD-10-CM

## 2024-09-18 DIAGNOSIS — G40.219 INTRACTABLE FOCAL EPILEPSY WITH IMPAIRMENT OF CONSCIOUSNESS (HCC): ICD-10-CM

## 2024-09-18 DIAGNOSIS — R56.9 INCREASING FREQUENCY OF SEIZURE ACTIVITY (HCC): ICD-10-CM

## 2024-09-18 DIAGNOSIS — G25.2 KINETIC TREMOR: ICD-10-CM

## 2024-09-18 DIAGNOSIS — G47.30 SLEEP APNEA, UNSPECIFIED TYPE: ICD-10-CM

## 2024-09-18 DIAGNOSIS — J44.9 CHRONIC OBSTRUCTIVE PULMONARY DISEASE, UNSPECIFIED COPD TYPE (HCC): ICD-10-CM

## 2024-09-18 DIAGNOSIS — Z85.46 HISTORY OF PROSTATE CANCER: ICD-10-CM

## 2024-09-18 PROCEDURE — 99214 OFFICE O/P EST MOD 30 MIN: CPT

## 2024-09-18 PROCEDURE — 3078F DIAST BP <80 MM HG: CPT | Performed by: STUDENT IN AN ORGANIZED HEALTH CARE EDUCATION/TRAINING PROGRAM

## 2024-09-18 PROCEDURE — 3074F SYST BP LT 130 MM HG: CPT | Performed by: STUDENT IN AN ORGANIZED HEALTH CARE EDUCATION/TRAINING PROGRAM

## 2024-09-18 PROCEDURE — 99214 OFFICE O/P EST MOD 30 MIN: CPT | Performed by: STUDENT IN AN ORGANIZED HEALTH CARE EDUCATION/TRAINING PROGRAM

## 2024-09-18 RX ORDER — FOLIC ACID 1 MG/1
1 TABLET ORAL DAILY
COMMUNITY

## 2024-09-18 RX ORDER — B-COMPLEX WITH VITAMIN C
1 TABLET ORAL DAILY
COMMUNITY

## 2024-09-18 ASSESSMENT — PATIENT HEALTH QUESTIONNAIRE - PHQ9: CLINICAL INTERPRETATION OF PHQ2 SCORE: 0

## 2024-09-18 ASSESSMENT — FIBROSIS 4 INDEX: FIB4 SCORE: 1.07

## 2024-09-18 NOTE — PROGRESS NOTES
NEUROLOGY FOLLOW-UP - 09/18/2024     REASON FOR VISIT: Fredy Zavala 64 y.o. male presents today for follow-up       SUMMARY RELEVANT PAST MEDICAL HISTORY AND/OR COMPENDIUM OF RELEVANT WORK-UP AND TREATMENTS TO DATE:  See prior notes for details    INTERVAL HISTORY:  Patient returns with wife. Seizure frequency for the past 4 months has increased from about 1-2 seizures (the the wife catches) to 4-8 seizures per month. They are brief focal aware seizures lasting 30-40 seconds. They are his known semiology of GUI, grunting sound, lip smacking. No tonic clonic seizures and no prolonged post-ictal period. He is compliant with Xcopri 400 mg per day which is compliant with. Has not missed a dose. Reports no major side effects. Sleep remains poor, fragmented. Often wakes up every hour of the night but does not feel tired in the morning or throughout the day. He is not wanting to start a medication. He is not aware that he is having them but his wife expressed concern. He has not had an accident/injury thankfully.    CURRENT MEDICATIONS AT THE TIME OF THIS ENCOUNTER:  Current Outpatient Medications on File Prior to Visit   Medication Sig Dispense Refill    B Complex-C (VITAMIN B + C COMPLEX) Tab Take 1 Tablet by mouth every day.      folic acid (FOLVITE) 1 MG Tab Take 1 mg by mouth every day.      Cenobamate (XCOPRI) 200 MG Tab Take 2 tablets (400 mg) by mouth at bedtime 60 Tablet 5    albuterol 108 (90 Base) MCG/ACT Aero Soln inhalation aerosol USE 2 INHALATIONS EVERY 6 HOURS AS NEEDED FOR SHORTNESS OF BREATH 6.7 g 3    atorvastatin (LIPITOR) 40 MG Tab Take 1 Tablet by mouth every evening. 90 Tablet 3    fluticasone-umeclidinium-vilanterol (TRELEGY ELLIPTA) 200-62.5-25 mcg/act inhaler Inhale 1 puff every day. 180 Each 3    DHA-EPA-Vitamin E (OMEGA-3 COMPLEX PO) Take  by mouth.      Cholecalciferol (VITAMIN D) 2000 UNIT Tab Take 2,000 Units by mouth every day.       No current facility-administered medications on file  "prior to visit.          EXAM:   /60 (BP Location: Right arm, Patient Position: Sitting, BP Cuff Size: Adult)   Pulse (!) 57   Temp 35.8 °C (96.5 °F) (Temporal)   Ht 1.727 m (5' 8\")   Wt 76.4 kg (168 lb 6.9 oz)   SpO2 97%    Wt Readings from Last 5 Encounters:   09/18/24 76.4 kg (168 lb 6.9 oz)   04/04/24 73 kg (161 lb)   03/20/24 79.9 kg (176 lb 2.4 oz)   12/07/23 73.5 kg (162 lb)   11/22/23 74.8 kg (164 lb 14.5 oz)      Physical Exam:  Physical Exam  Constitutional:       General: He is not in acute distress.  HENT:      Head: Normocephalic.      Nose: Nose normal.      Mouth/Throat:      Pharynx: Uvula swelling present.   Pulmonary:      Effort: Pulmonary effort is normal.   Neurological:      Coordination: Romberg sign negative.      Deep Tendon Reflexes:      Reflex Scores:       Tricep reflexes are 2+ on the right side and 2+ on the left side.       Bicep reflexes are 2+ on the right side and 2+ on the left side.       Brachioradialis reflexes are 2+ on the right side and 2+ on the left side.       Patellar reflexes are 3+ on the right side and 3+ on the left side.       Achilles reflexes are 2+ on the right side and 2+ on the left side.  Psychiatric:         Speech: Speech normal.        Neurological Exam   Neurological Exam  Mental Status   Oriented only to person, place, time and situation. Speech is normal. Language is fluent with no aphasia.  A.    Motor   No fasciculations present. Normal muscle tone. No abnormal involuntary movements. No pronator drift.    Sensory  Light touch is normal in upper and lower extremities.     Reflexes                                            Right                      Left  Brachioradialis                    2+                         2+  Biceps                                 2+                         2+  Triceps                                2+                         2+  Patellar                                3+                         3+  Achilles        "                         2+                         2+    Coordination  Right: Finger-to-nose normal. Rapid alternating movement normal.Left: Finger-to-nose normal. Rapid alternating movement normal.  -finger.    Gait  Casual gait is normal including stance, stride, and arm swing.Normal toe walking. Normal heel walking. Tandem gait abnormality: Romberg is absent.         ASSESSMENT, EDUCATION, AND COUNSELING:  This is a 64 y.o. male patient who presents to the neurology clinic. We had an extensive discussion about the patient's symptoms, signs, and work-up to date, if any. We discussed potential and/or definitive diagnoses, work-up, and potential treatments.     PLAN:  If applicable, the work-up such as labs, imaging, procedures, and/or other testing, referrals, and/or recommended treatment strategies are listed below.    Medications were administered today in clinic (if any):     Visit Diagnoses     ICD-10-CM   1. 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 Chronic obstructive pulmonary disease, unspecified COPD type (HCC)  J44.9   3. 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 Hypertrophy of uvula  K13.79   5. Sleep apnea, unspecified type  G47.30   6. History of prostate cancer  Z85.46   7. Increasing frequency of seizure activity (HCC)  R56.9      Orders Placed This Encounter    CBC WITH DIFFERENTIAL    Comp Metabolic Panel    CENOBAMATE (XCOPRI), PLASMA    B Complex-C (VITAMIN B + C COMPLEX) Tab    folic acid (FOLVITE) 1 MG Tab        Patient with intractable focal epilepsy with an increase in frequency of focal unaware seizures as noted above. Patient does not want make any changes or to start a new medication. I would recommend that we start Briviact at 25 mg BID and increase slowly. However, patient wants to think about it. Will obtain updated routine blood work and Xcopri level and follow-up closely in 3 months. Wife ill continue to monitor his seizures.        BILLING DOCUMENTATION:      The number of minutes of face-to-face time spent in this encounter was I spent a total of 30 minutes on the day of the visit.  . Over 50% of the time of the visit today was spent on counseling and/or coordination of care wtih the patient and/or family, as outlined above in assessment in plan.    Fredy Franklin MD  Department of Neurology at [facility]  Diplomate of the American Board of Psychiatry and Neurology, General Neurology  Diplomate of American Board of Psychiatry and Neurology, a Member Board of the American Board of Medical Subspecialties, Epilepsy  Director of [facility] Level III Comprehensive Epilepsy Program  Professor of Clinical Neurology, [facility].   [address] [facility], [address]  [facility] [id number] [phone]   Fax: [phone]  E-mail: [email or web address]

## 2024-09-18 NOTE — PATIENT INSTRUCTIONS
NEUROLOGY CLINIC VISIT WITH DR. FRANKLIN     PLEASE READ THIS ENTIRE DOCUMENT CAREFULLY AND COMPLETELY:    First and foremost, you matter to Dr. Franklin and you deserve the best care.   Dr. Franklin prides himself on providing the best possible care to all his patients. He strives to make each appointment meaningful, so that all your concerns are being addressed and all your neurological problems are being optimally treated. In order to achieve these goals for everyone, Dr. Franklin has listed important reminders and the best ways to prepare for each appointment. Please read each item carefully. Thank you!    Due to the high volume of patients we are trying to help, your physician will not be able to respond by phone or in [employer] to your routine concerns between appointments.  This does not reflect a lack of interest or concern for you or your diagnosis.  Please bring these questions and concerns to your appointment where your physician can answer.  Please relay more pressing concerns to our office, either via [employer], or by phone; if not able to reach us please visit nearby Urgent Care Center or Emergency Department.  If any emergent medical needs, please seek emergent medical help and/or call 911.    Also, please note that we are not able to fill out paperwork that might be related to your work, utility company, disability, and/or driving, among others, in between the visits.  Please schedule a dedicated appointment to address any and all paperwork.  This is not due to lack of concern or interest for your disease-related work/administrative problems, but to make sure that we provide the best possible care and to fill out your paperwork in a correct, complete, and timely manner.  ------------------------------------------------------------------------------------------  Please let our office know if you have any changes in your seizure frequency and/characteristics.     Please keep a diary of your seizures and bring it  with you to each appointment.    Please take vitamin D3 [id number] internation units daily.     Please abstain from driving until further notice    If you are a biological female with epilepsy who is of reproductive age, who is actively breastfeeding, and/or who infants/young children:  Please take folic acid 1 mg daily. This is an over-the-counter supplement that is recommended to prevent certain developmental problems in your baby, in case you become pregnant in the future.  It is critical that you let our office know as soon as you become pregnant or plan to become pregnant.  If you are caring for a baby/young child, please make sure to be sitting on a soft surface while holding your baby/young child, so in case you have a seizure, your baby/young child is not injured due to fall.   Please let us know if, while breastfeeding, you observe that your baby is excessively sleepy and/or has other behavioral changes. Because many antiseizure medications are collected in breast milk, some nursing babies can suffer adverse medication effects.    Please note that the following might precipitate seizures:   missed doses of antiseizure medications  being sick with a fever, stress  Fatigue  sleep deprivation or abnormal sleeping patterns  not eating regularly  not drinking enough water  drinking too much alcohol  stopping alcohol suddenly if you are currently using it on a regular/daily basis,   using recreational drugs, among others.    Please note that the following might lead to an injury or even be life-threatening in the event you have a seizure and/or lose awareness while:  being in a large body of water by yourself, such as bath, pool, lake, ocean, among others (risk of drowning)  being on unprotected heights (risk of fall)  being around and/or operating heavy machinery (risk of injury)  being around open fire/hot surfaces (risk of burns)  any other activities/circumstances, in which if you lose awareness, you might  injure yourself and/or others.  -------------------------------------------------------------------------------------------  SUDEP (SUDDEN UNEXPECTED DEATH IN EPILEPSY)  It is important that your seizures are well controlled and you have none or have them rarely. In addition to avoiding injury related to breakthrough seizures, frequent seizures increase risk of SUDEP (sudden unexpected death in epilepsy), where a person goes into a seizure and then never wakes up. The best way to prevent SUDEP is to control your seizures well.   ------------------------------------------------------------------------------------------  Please call for help (crisis line and/or 911) in case you have thoughts of harming yourself and/or others.  ------------------------------------------------------------------------------------------  INSTRUCTIONS FOR YOUR FAMILY/CAREGIVERS:  Please call 911 if the patient has a seizure longer than 2-3 minutes, if seizures are back to back without her recovering to her baseline, or she does not start recovering within 5-10 minutes after the seizure stops. During the seizure - please turn her on her side, please make sure her head is protected (for example, you should put a pillow under her head, if one is available), and please do not put anything in her mouth.   ------------------------------------------------------------------------------------------  PATIENT EXPECTATIONS,  IMPORTANT APPOINTMENT REMINDERS, AND ADDITIONAL HELPFUL TIPS:   REFILLS:   Request refills AT LEAST 1 week in advance to ensure you do not run out of medications    MyChart  It is STRONGLY encouraged that ALL patients sign up for MyChart. It is BY FAR the fastest and most convenient way for both Dr. Franklin and patients to obtain timely refills.  If you are having trouble signing up or logging into your account, staff are available to help you. Please ask a medical assistant or staff at the  to assist you.    TEST RESULS:    All labs and diagnostic test results will be reviewed at your next visit, UNLESS  Dr. Franklin determines that there are important findings on the tests need to be acted on sooner. Dr. Franklin will either call or send a message through [facility] if this is the case.    BE PREPARED PRIOR TO EVERY APPOINTMENT:  All patient are responsible for ensuring that ALL test results that were completed outside of the [employer] system have been received by our Neurology Department PRIOR to your appointment with Dr. Franklin.    IMPORTANT:  ALL images (not just the reports) must be sent and uploaded to the [employer] system. Dr. Franklin reviews all images personally prior to each visit. Ensuring that ALL the test results and test images are accessible to Dr. Franklin prior to your appointment is YOUR responsibility and an important part of making the most out of each appointment.   Bring a government-issues picture ID and an updated insurance card EVERY visit.  It is highly recommended that you bring at every visit a list of the most important topics that you want address. While it may not be possible to address all items on the list in a single visit, preparing a list will ensure that Dr. Franklin addresses the items that are most important to you and your health    PAPERWORK, DOCUMENTATION, LETTER REQUESTS:  You must notify the office ahead of your appointment of all paperwork or letter requests.   Please DO NOT wait until the last minute to make these requests. Please give all paperwork to the medical assistant at the start of the appointment and check-in process. Please note that Dr. Franklin may not be able complete some types of documentation in a single appointment or even within a single day or week. This is why it is important to communicate paperwork requests prior to your appointment and at least 2 weeks prior to any deadlines.    KNOW ALL YOU MEDICATIONS:   AT EVERY SINGLE APPOINTMENT, please bring a list of every single prescribed,  non-prescribed, and over the counter medication or supplements you are taking, including ones taken on a rare or intermittent basis.  Include the following information for each prescribed or non-prescribed medications:  Name of medication   The strength of EACH pill/capsule/tablet, etc.   The number of pills/capsules/tablets, etc taken per dose  The number and time of day that doses are taken  For every single Supplement that you take on a routine or intermittent basis, you must include:  The Brand Name   A complete list of every single ingredient, compound, vitamin, and/or mineral in each dose, along with the corresponding amounts/strengths of all ingredients, vitamins, minerals, etc., if such information is provided or known  The number of doses taken per day and time of day doses are taken  If medications are taken on an intermittent or as needed basis, please estimate how many days per week or days per month the medications are used  DO NOT just print out your medication list from [employer] or bring a list from a prior appointment or hospitalizations because the information is often often unreliable, inaccurate, outdated, and/or incomplete   The list should be printed or written  If you forget or do not have a list of all the medication, then it is acceptable, although less preferred, to bring all the bottles to the appointment     ARRIVE EARLY FOR ALL VISITS:  Please note that we are unable to accommodate late arrivals as per office policy.  YOU-the patient - (NOT a parent, spouse, or friend) must be physically present at check-in no later than 12 minutes after the scheduled appointment time, or you will be asked to reschedule   Consider scheduling a virtual appointment with Dr. Franklin through [employer] as an alternative if transportation to the clinic is difficult or unavailable   Please note, however, that virtual visits can only be scheduled after being an established patient of Dr. Franklin. All new appointments  "must be done in-person in clinic  Some insurances will not cover the cost of virtual appointments. Please check with your insurance to find out if these visits are covered    COMMUNICATING URGENT AND NON-URGENT MATTERS:  Your concerns are important and deserve to be heard and addressed. If you have an urgent matter, there are two methods that will ensure your concerns are prioritized appropriately:   Preferred method: Sign-up/Login to your [employer] account and send a message addressed to Dr. Franklin or Margret David (Dr. Franklin's assistant). In the subject line, type \"urgent\" followed by a word or phrase describing the situation (For example, write \"Urgent: Out of antiseuzre med and need refill\" or \"Urgent: Severe side effects to new meds\". In doing this, our staff can ensure urgent messages are triaged appropriately and communicated to Dr. Franklin that day.  Call [facility] main line at [phone]. Dr. Franklin's voicemail extension is [phone]. When leaving a voice message, specifically indicate if it is urgent (or non-urgent) so that the matter can be triaged appropriately and addressed in a timely manner    Thank you for entrusting your neurological care to [facility] and we look forward to continuing to serve you.   "

## 2024-09-30 ENCOUNTER — HOSPITAL ENCOUNTER (OUTPATIENT)
Dept: LAB | Facility: MEDICAL CENTER | Age: 64
End: 2024-09-30
Attending: STUDENT IN AN ORGANIZED HEALTH CARE EDUCATION/TRAINING PROGRAM
Payer: MEDICARE

## 2024-09-30 DIAGNOSIS — G40.219 INTRACTABLE FOCAL EPILEPSY WITH IMPAIRMENT OF CONSCIOUSNESS (HCC): ICD-10-CM

## 2024-09-30 LAB
ALBUMIN SERPL BCP-MCNC: 4.4 G/DL (ref 3.2–4.9)
ALBUMIN/GLOB SERPL: 1.7 G/DL
ALP SERPL-CCNC: 93 U/L (ref 30–99)
ALT SERPL-CCNC: 27 U/L (ref 2–50)
ANION GAP SERPL CALC-SCNC: 12 MMOL/L (ref 7–16)
AST SERPL-CCNC: 23 U/L (ref 12–45)
BASOPHILS # BLD AUTO: 0.5 % (ref 0–1.8)
BASOPHILS # BLD: 0.03 K/UL (ref 0–0.12)
BILIRUB SERPL-MCNC: <0.2 MG/DL (ref 0.1–1.5)
BUN SERPL-MCNC: 10 MG/DL (ref 8–22)
CALCIUM ALBUM COR SERPL-MCNC: 9.5 MG/DL (ref 8.5–10.5)
CALCIUM SERPL-MCNC: 9.8 MG/DL (ref 8.5–10.5)
CHLORIDE SERPL-SCNC: 104 MMOL/L (ref 96–112)
CO2 SERPL-SCNC: 24 MMOL/L (ref 20–33)
CREAT SERPL-MCNC: 0.81 MG/DL (ref 0.5–1.4)
EOSINOPHIL # BLD AUTO: 0.27 K/UL (ref 0–0.51)
EOSINOPHIL NFR BLD: 4.3 % (ref 0–6.9)
ERYTHROCYTE [DISTWIDTH] IN BLOOD BY AUTOMATED COUNT: 46.1 FL (ref 35.9–50)
GFR SERPLBLD CREATININE-BSD FMLA CKD-EPI: 98 ML/MIN/1.73 M 2
GLOBULIN SER CALC-MCNC: 2.6 G/DL (ref 1.9–3.5)
GLUCOSE SERPL-MCNC: 99 MG/DL (ref 65–99)
HCT VFR BLD AUTO: 43.3 % (ref 42–52)
HGB BLD-MCNC: 14.7 G/DL (ref 14–18)
IMM GRANULOCYTES # BLD AUTO: 0.01 K/UL (ref 0–0.11)
IMM GRANULOCYTES NFR BLD AUTO: 0.2 % (ref 0–0.9)
LYMPHOCYTES # BLD AUTO: 2.2 K/UL (ref 1–4.8)
LYMPHOCYTES NFR BLD: 35.4 % (ref 22–41)
MCH RBC QN AUTO: 32.5 PG (ref 27–33)
MCHC RBC AUTO-ENTMCNC: 33.9 G/DL (ref 32.3–36.5)
MCV RBC AUTO: 95.6 FL (ref 81.4–97.8)
MONOCYTES # BLD AUTO: 0.65 K/UL (ref 0–0.85)
MONOCYTES NFR BLD AUTO: 10.5 % (ref 0–13.4)
NEUTROPHILS # BLD AUTO: 3.06 K/UL (ref 1.82–7.42)
NEUTROPHILS NFR BLD: 49.1 % (ref 44–72)
NRBC # BLD AUTO: 0 K/UL
NRBC BLD-RTO: 0 /100 WBC (ref 0–0.2)
PLATELET # BLD AUTO: 272 K/UL (ref 164–446)
PMV BLD AUTO: 9 FL (ref 9–12.9)
POTASSIUM SERPL-SCNC: 4.5 MMOL/L (ref 3.6–5.5)
PROT SERPL-MCNC: 7 G/DL (ref 6–8.2)
RBC # BLD AUTO: 4.53 M/UL (ref 4.7–6.1)
SODIUM SERPL-SCNC: 140 MMOL/L (ref 135–145)
WBC # BLD AUTO: 6.2 K/UL (ref 4.8–10.8)

## 2024-09-30 PROCEDURE — 80299 QUANTITATIVE ASSAY DRUG: CPT

## 2024-09-30 PROCEDURE — 85025 COMPLETE CBC W/AUTO DIFF WBC: CPT

## 2024-09-30 PROCEDURE — 80053 COMPREHEN METABOLIC PANEL: CPT

## 2024-09-30 PROCEDURE — 36415 COLL VENOUS BLD VENIPUNCTURE: CPT

## 2024-10-09 ENCOUNTER — APPOINTMENT (OUTPATIENT)
Dept: MEDICAL GROUP | Facility: IMAGING CENTER | Age: 64
End: 2024-10-09
Payer: MEDICARE

## 2024-10-09 VITALS
DIASTOLIC BLOOD PRESSURE: 80 MMHG | TEMPERATURE: 97 F | RESPIRATION RATE: 16 BRPM | OXYGEN SATURATION: 97 % | WEIGHT: 170 LBS | HEIGHT: 68 IN | HEART RATE: 57 BPM | BODY MASS INDEX: 25.76 KG/M2 | SYSTOLIC BLOOD PRESSURE: 128 MMHG

## 2024-10-09 DIAGNOSIS — R79.89 OTHER SPECIFIED ABNORMAL FINDINGS OF BLOOD CHEMISTRY: ICD-10-CM

## 2024-10-09 DIAGNOSIS — E53.8 LOW SERUM VITAMIN B12: ICD-10-CM

## 2024-10-09 DIAGNOSIS — Z23 NEED FOR VACCINATION: ICD-10-CM

## 2024-10-09 DIAGNOSIS — E53.8 LOW FOLATE: ICD-10-CM

## 2024-10-09 DIAGNOSIS — G47.30 SLEEP APNEA, UNSPECIFIED TYPE: ICD-10-CM

## 2024-10-09 DIAGNOSIS — G47.9 RESTLESS SLEEPER: ICD-10-CM

## 2024-10-09 DIAGNOSIS — Z13.29 SCREENING FOR THYROID DISORDER: ICD-10-CM

## 2024-10-09 DIAGNOSIS — E78.5 DYSLIPIDEMIA: ICD-10-CM

## 2024-10-09 PROCEDURE — 1126F AMNT PAIN NOTED NONE PRSNT: CPT | Performed by: PHYSICIAN ASSISTANT

## 2024-10-09 PROCEDURE — RXMED WILLOW AMBULATORY MEDICATION CHARGE: Performed by: PHYSICIAN ASSISTANT

## 2024-10-09 PROCEDURE — 3074F SYST BP LT 130 MM HG: CPT | Performed by: PHYSICIAN ASSISTANT

## 2024-10-09 PROCEDURE — G0008 ADMIN INFLUENZA VIRUS VAC: HCPCS | Performed by: PHYSICIAN ASSISTANT

## 2024-10-09 PROCEDURE — 3079F DIAST BP 80-89 MM HG: CPT | Performed by: PHYSICIAN ASSISTANT

## 2024-10-09 PROCEDURE — 90662 IIV NO PRSV INCREASED AG IM: CPT | Performed by: PHYSICIAN ASSISTANT

## 2024-10-09 PROCEDURE — 99214 OFFICE O/P EST MOD 30 MIN: CPT | Mod: 25 | Performed by: PHYSICIAN ASSISTANT

## 2024-10-09 RX ORDER — ATORVASTATIN CALCIUM 40 MG/1
40 TABLET, FILM COATED ORAL NIGHTLY
Qty: 90 TABLET | Refills: 3 | Status: CANCELLED | OUTPATIENT
Start: 2024-10-09

## 2024-10-09 RX ORDER — ROSUVASTATIN CALCIUM 40 MG/1
40 TABLET, COATED ORAL DAILY
Qty: 90 TABLET | Refills: 3 | Status: SHIPPED | OUTPATIENT
Start: 2024-10-09

## 2024-10-09 ASSESSMENT — PAIN SCALES - GENERAL: PAINLEVEL: NO PAIN

## 2024-10-09 ASSESSMENT — FIBROSIS 4 INDEX: FIB4 SCORE: 1.06

## 2024-10-10 ENCOUNTER — PHARMACY VISIT (OUTPATIENT)
Dept: PHARMACY | Facility: MEDICAL CENTER | Age: 64
End: 2024-10-10
Payer: COMMERCIAL

## 2024-10-14 PROCEDURE — RXMED WILLOW AMBULATORY MEDICATION CHARGE: Performed by: STUDENT IN AN ORGANIZED HEALTH CARE EDUCATION/TRAINING PROGRAM

## 2024-10-16 LAB — TEST NAME 95000: NORMAL

## 2024-10-18 ENCOUNTER — PHARMACY VISIT (OUTPATIENT)
Dept: PHARMACY | Facility: MEDICAL CENTER | Age: 64
End: 2024-10-18
Payer: COMMERCIAL

## 2024-10-22 ENCOUNTER — APPOINTMENT (RX ONLY)
Dept: URBAN - METROPOLITAN AREA CLINIC 15 | Facility: CLINIC | Age: 64
Setting detail: DERMATOLOGY
End: 2024-10-22

## 2024-10-22 DIAGNOSIS — Z71.89 OTHER SPECIFIED COUNSELING: ICD-10-CM

## 2024-10-22 DIAGNOSIS — L70.8 OTHER ACNE: ICD-10-CM

## 2024-10-22 DIAGNOSIS — L81.4 OTHER MELANIN HYPERPIGMENTATION: ICD-10-CM

## 2024-10-22 DIAGNOSIS — L82.1 OTHER SEBORRHEIC KERATOSIS: ICD-10-CM

## 2024-10-22 DIAGNOSIS — D22 MELANOCYTIC NEVI: ICD-10-CM

## 2024-10-22 DIAGNOSIS — D18.0 HEMANGIOMA: ICD-10-CM

## 2024-10-22 PROBLEM — D22.71 MELANOCYTIC NEVI OF RIGHT LOWER LIMB, INCLUDING HIP: Status: ACTIVE | Noted: 2024-10-22

## 2024-10-22 PROBLEM — D18.01 HEMANGIOMA OF SKIN AND SUBCUTANEOUS TISSUE: Status: ACTIVE | Noted: 2024-10-22

## 2024-10-22 PROCEDURE — ? COUNSELING

## 2024-10-22 PROCEDURE — ? SUNSCREEN RECOMMENDATIONS

## 2024-10-22 PROCEDURE — 99213 OFFICE O/P EST LOW 20 MIN: CPT

## 2024-10-22 ASSESSMENT — LOCATION ZONE DERM
LOCATION ZONE: FACE
LOCATION ZONE: TRUNK
LOCATION ZONE: LEG
LOCATION ZONE: ARM
LOCATION ZONE: NECK

## 2024-10-22 ASSESSMENT — LOCATION DETAILED DESCRIPTION DERM
LOCATION DETAILED: LEFT ANTERIOR DISTAL UPPER ARM
LOCATION DETAILED: LEFT PROXIMAL DORSAL FOREARM
LOCATION DETAILED: RIGHT PROXIMAL CALF
LOCATION DETAILED: RIGHT SUPERIOR MEDIAL MALAR CHEEK
LOCATION DETAILED: LEFT CLAVICULAR NECK
LOCATION DETAILED: RIGHT MEDIAL INFERIOR CHEST

## 2024-10-22 ASSESSMENT — LOCATION SIMPLE DESCRIPTION DERM
LOCATION SIMPLE: RIGHT CHEEK
LOCATION SIMPLE: LEFT ANTERIOR NECK
LOCATION SIMPLE: CHEST
LOCATION SIMPLE: LEFT UPPER ARM
LOCATION SIMPLE: RIGHT CALF
LOCATION SIMPLE: LEFT FOREARM

## 2024-11-13 PROCEDURE — RXMED WILLOW AMBULATORY MEDICATION CHARGE: Performed by: STUDENT IN AN ORGANIZED HEALTH CARE EDUCATION/TRAINING PROGRAM

## 2024-11-15 ENCOUNTER — PHARMACY VISIT (OUTPATIENT)
Dept: PHARMACY | Facility: MEDICAL CENTER | Age: 64
End: 2024-11-15
Payer: COMMERCIAL

## 2024-11-22 ENCOUNTER — HOSPITAL ENCOUNTER (OUTPATIENT)
Dept: LAB | Facility: MEDICAL CENTER | Age: 64
End: 2024-11-22
Attending: PHYSICIAN ASSISTANT
Payer: MEDICARE

## 2024-11-22 DIAGNOSIS — E78.5 DYSLIPIDEMIA: ICD-10-CM

## 2024-11-22 DIAGNOSIS — E53.8 LOW FOLATE: ICD-10-CM

## 2024-11-22 DIAGNOSIS — Z13.29 SCREENING FOR THYROID DISORDER: ICD-10-CM

## 2024-11-22 DIAGNOSIS — R79.89 OTHER SPECIFIED ABNORMAL FINDINGS OF BLOOD CHEMISTRY: ICD-10-CM

## 2024-11-22 DIAGNOSIS — E53.8 LOW SERUM VITAMIN B12: ICD-10-CM

## 2024-11-22 DIAGNOSIS — G47.9 RESTLESS SLEEPER: ICD-10-CM

## 2024-11-22 LAB
CHOLEST SERPL-MCNC: 164 MG/DL (ref 100–199)
FERRITIN SERPL-MCNC: 198 NG/ML (ref 22–322)
FOLATE SERPL-MCNC: 13 NG/ML
HDLC SERPL-MCNC: 46 MG/DL
IRON SATN MFR SERPL: 47 % (ref 15–55)
IRON SERPL-MCNC: 144 UG/DL (ref 50–180)
LDLC SERPL CALC-MCNC: 84 MG/DL
TIBC SERPL-MCNC: 305 UG/DL (ref 250–450)
TRIGL SERPL-MCNC: 168 MG/DL (ref 0–149)
TSH SERPL DL<=0.005 MIU/L-ACNC: 2.16 UIU/ML (ref 0.38–5.33)
UIBC SERPL-MCNC: 161 UG/DL (ref 110–370)
VIT B12 SERPL-MCNC: 722 PG/ML (ref 211–911)

## 2024-11-22 PROCEDURE — 82607 VITAMIN B-12: CPT

## 2024-11-22 PROCEDURE — 82746 ASSAY OF FOLIC ACID SERUM: CPT

## 2024-11-22 PROCEDURE — 80061 LIPID PANEL: CPT

## 2024-11-22 PROCEDURE — 83550 IRON BINDING TEST: CPT

## 2024-11-22 PROCEDURE — 84443 ASSAY THYROID STIM HORMONE: CPT

## 2024-11-22 PROCEDURE — 36415 COLL VENOUS BLD VENIPUNCTURE: CPT

## 2024-11-22 PROCEDURE — 82728 ASSAY OF FERRITIN: CPT

## 2024-11-22 PROCEDURE — 83540 ASSAY OF IRON: CPT

## 2024-12-12 PROCEDURE — RXMED WILLOW AMBULATORY MEDICATION CHARGE: Performed by: STUDENT IN AN ORGANIZED HEALTH CARE EDUCATION/TRAINING PROGRAM

## 2024-12-13 ENCOUNTER — PHARMACY VISIT (OUTPATIENT)
Dept: PHARMACY | Facility: MEDICAL CENTER | Age: 64
End: 2024-12-13
Payer: COMMERCIAL

## 2025-01-01 PROCEDURE — RXMED WILLOW AMBULATORY MEDICATION CHARGE: Performed by: PHYSICIAN ASSISTANT

## 2025-01-02 ENCOUNTER — OFFICE VISIT (OUTPATIENT)
Dept: NEUROLOGY | Facility: MEDICAL CENTER | Age: 65
End: 2025-01-02
Attending: STUDENT IN AN ORGANIZED HEALTH CARE EDUCATION/TRAINING PROGRAM
Payer: MEDICARE

## 2025-01-02 ENCOUNTER — TELEPHONE (OUTPATIENT)
Dept: NEUROLOGY | Facility: MEDICAL CENTER | Age: 65
End: 2025-01-02

## 2025-01-02 ENCOUNTER — PHARMACY VISIT (OUTPATIENT)
Dept: PHARMACY | Facility: MEDICAL CENTER | Age: 65
End: 2025-01-02
Payer: COMMERCIAL

## 2025-01-02 VITALS
DIASTOLIC BLOOD PRESSURE: 68 MMHG | TEMPERATURE: 97.4 F | HEART RATE: 74 BPM | HEIGHT: 68 IN | OXYGEN SATURATION: 96 % | BODY MASS INDEX: 28.83 KG/M2 | WEIGHT: 190.26 LBS | SYSTOLIC BLOOD PRESSURE: 122 MMHG

## 2025-01-02 DIAGNOSIS — K13.79 HYPERTROPHY OF UVULA: ICD-10-CM

## 2025-01-02 DIAGNOSIS — R90.89 ABNORMAL BRAIN MRI: ICD-10-CM

## 2025-01-02 DIAGNOSIS — G25.1 TREMOR DUE TO MULTIPLE DRUGS: ICD-10-CM

## 2025-01-02 DIAGNOSIS — R56.9 INCREASING FREQUENCY OF SEIZURE ACTIVITY (HCC): ICD-10-CM

## 2025-01-02 DIAGNOSIS — G25.2 CEREBELLAR TREMOR: ICD-10-CM

## 2025-01-02 DIAGNOSIS — E55.9 VITAMIN D DEFICIENCY: ICD-10-CM

## 2025-01-02 DIAGNOSIS — G40.219 INTRACTABLE FOCAL EPILEPSY WITH IMPAIRMENT OF CONSCIOUSNESS (HCC): ICD-10-CM

## 2025-01-02 DIAGNOSIS — E78.5 DYSLIPIDEMIA: ICD-10-CM

## 2025-01-02 DIAGNOSIS — G47.30 SLEEP APNEA, UNSPECIFIED TYPE: ICD-10-CM

## 2025-01-02 DIAGNOSIS — G40.209 PARTIAL EPILEPSY WITH IMPAIRMENT OF CONSCIOUSNESS (HCC): ICD-10-CM

## 2025-01-02 DIAGNOSIS — G25.2 KINETIC TREMOR: ICD-10-CM

## 2025-01-02 DIAGNOSIS — J44.9 CHRONIC OBSTRUCTIVE PULMONARY DISEASE, UNSPECIFIED COPD TYPE (HCC): ICD-10-CM

## 2025-01-02 DIAGNOSIS — Z85.46 HISTORY OF PROSTATE CANCER: ICD-10-CM

## 2025-01-02 PROCEDURE — 3078F DIAST BP <80 MM HG: CPT | Performed by: STUDENT IN AN ORGANIZED HEALTH CARE EDUCATION/TRAINING PROGRAM

## 2025-01-02 PROCEDURE — 99214 OFFICE O/P EST MOD 30 MIN: CPT | Performed by: STUDENT IN AN ORGANIZED HEALTH CARE EDUCATION/TRAINING PROGRAM

## 2025-01-02 PROCEDURE — 3074F SYST BP LT 130 MM HG: CPT | Performed by: STUDENT IN AN ORGANIZED HEALTH CARE EDUCATION/TRAINING PROGRAM

## 2025-01-02 PROCEDURE — 99212 OFFICE O/P EST SF 10 MIN: CPT | Performed by: STUDENT IN AN ORGANIZED HEALTH CARE EDUCATION/TRAINING PROGRAM

## 2025-01-02 RX ORDER — CENOBAMATE 200 MG/1
400 TABLET, FILM COATED ORAL
Qty: 60 TABLET | Refills: 5 | Status: SHIPPED | OUTPATIENT
Start: 2025-01-02 | End: 2025-07-01

## 2025-01-02 RX ORDER — BRIVARACETAM 25 MG/1
25 TABLET, FILM COATED ORAL 2 TIMES DAILY
Qty: 60 TABLET | Refills: 5 | Status: SHIPPED | OUTPATIENT
Start: 2025-01-02 | End: 2025-07-01

## 2025-01-02 ASSESSMENT — FIBROSIS 4 INDEX: FIB4 SCORE: 1.06

## 2025-01-02 ASSESSMENT — PATIENT HEALTH QUESTIONNAIRE - PHQ9: CLINICAL INTERPRETATION OF PHQ2 SCORE: 0

## 2025-01-02 NOTE — PROGRESS NOTES
NEUROLOGY FOLLOW-UP - 01/02/2025     REASON FOR VISIT: Fredy Zavala 64 y.o. male presents today for follow-up     SUMMARY RELEVANT PAST MEDICAL HISTORY   See prior notes for full details    Clinic Visit September 18, 2024:  Seizure frequency for the past 4 months has increased from about 1-2 to 4-8 seizures per month  (which the wife catches as patient lack complete awareness of them). They are brief focal aware seizures lasting 30-40 seconds. They are his known semiology of GUI, grunting sound, lip smacking. No tonic clonic seizures and no prolonged post-ictal period. He is compliant with Xcopri 400 mg per day which is compliant with. Has not missed a dose. Reports no major side effects. Sleep remains poor, fragmented. Often wakes up every hour of the night but does not feel tired in the morning or throughout the day. He is not wanting to start a medication. He is not aware that he is having them but his wife expressed concern. He has not had an accident/injury thankfully.   .Patient does not want make any changes or to start a new medication. I recommended that we start Briviact at 25 mg BID and increase slowly. However, patient wants to think about it.     COMPENDIUM OF RELEVANT WORK-UP AND TREATMENTS TO DATE:  See prior notes for Details    INTERVAL HISTORY:  Patient returns with his wife for follow-up as usual. Patient continues to have about 3-4 seizures per month. His wife captured one of the seizures on video which was rather prolonged lasting several minutes, possibly > 5 minutes. He had prolonged post-ictal confusion. His seizures are not usually this long. Despite this seizure frequency, they have been slightly less frequently compared to last visit where he was having about 2 seizures per week.     No other changes to hs health. He has sleep apnea that is not being treated due to his inability to tolerate CPAP.    Labs reviewed an are unremarkable.    Xcopri level is at the upper end of normal  "therapeutic range.    Test                 Result   Flag   Units   Reference Range   ------------------------------------------------------------   Cenobamate (Xcopri), Plasma   ------------------------------------------------------------   Cenobamate         30.9            ug/mL   ------------------------------------------------------------   Although the therapeutic range is not well established,   the suggested range at doses of 100 - 400 mg/day is   5 - 35 ug/mL.     Prior AEDs (all medication were incom,epletely effective and side effects are mentioned if present  Depakote - sedating, sleepy  Vimpat - not effective  Aptiom   Phenobarbital -sedating  Dilantin - sedating  Tegretol/Carbatrol  Keppra  Zonisamide - made him anxious  Lamictal - not effective    CURRENT MEDICATIONS:  Current Outpatient Medications on File Prior to Visit   Medication Sig Dispense Refill    B Complex Vitamins (VITAMIN B COMPLEX PO) Take  by mouth.      rosuvastatin (CRESTOR) 40 MG tablet Take 1 Tablet by mouth every day. 90 Tablet 3    folic acid (FOLVITE) 1 MG Tab Take 1 mg by mouth every day.      albuterol 108 (90 Base) MCG/ACT Aero Soln inhalation aerosol USE 2 INHALATIONS EVERY 6 HOURS AS NEEDED FOR SHORTNESS OF BREATH 6.7 g 3    fluticasone-umeclidinium-vilanterol (TRELEGY ELLIPTA) 200-62.5-25 mcg/act inhaler Inhale 1 puff every day. 180 Each 3    DHA-EPA-Vitamin E (OMEGA-3 COMPLEX PO) Take  by mouth.      Cholecalciferol (VITAMIN D) 2000 UNIT Tab Take 2,000 Units by mouth every day.       No current facility-administered medications on file prior to visit.        EXAM:   /68 (BP Location: Left arm, Patient Position: Sitting, BP Cuff Size: Adult)   Pulse 74   Temp 36.3 °C (97.4 °F) (Temporal)   Ht 1.727 m (5' 8\")   Wt 86.3 kg (190 lb 4.1 oz)   SpO2 96%    Wt Readings from Last 5 Encounters:   01/02/25 86.3 kg (190 lb 4.1 oz)   10/09/24 77.1 kg (170 lb)   09/18/24 76.4 kg (168 lb 6.9 oz)   04/04/24 73 kg (161 lb) "   03/20/24 79.9 kg (176 lb 2.4 oz)      Physical Exam:  Physical Exam  Neurological:      Coordination: Romberg sign negative.   Psychiatric:         Speech: Speech normal.        Neurological Exam   Neurological Exam  Mental Status   Oriented only to person, place, time and situation. Speech is normal. Language is fluent with no aphasia.  A.    Motor   No fasciculations present. Normal muscle tone. No abnormal involuntary movements. No pronator drift.    Sensory  Light touch is normal in upper and lower extremities.     Coordination  Right: Finger-to-nose normal. Rapid alternating movement normal.Left: Finger-to-nose normal. Rapid alternating movement normal.  -finger.    Gait  Casual gait is normal including stance, stride, and arm swing.Normal toe walking. Normal heel walking. Tandem gait abnormality: Romberg is absent.       ASSESSMENT, EDUCATION, AND COUNSELING:  This is a 64 y.o. male patient who presents to the neurology clinic. We had an extensive discussion about the patient's symptoms, signs, and work-up to date, if any. We discussed potential and/or definitive diagnoses, work-up, and potential treatments.     PLAN:  Medications administered in today's encounter if applicable:       If applicable, the work-up such as labs, imaging, procedures, and/or other testing, referrals, and/or recommended treatment strategies are listed below.  Orders Placed This Encounter    Brivaracetam (BRIVIACT) 25 MG Tab    Cenobamate (XCOPRI) 200 MG Tab           Medication List            Accurate as of January 2, 2025  8:27 AM. If you have any questions, ask your nurse or doctor.                START taking these medications        Instructions   Briviact 25 MG Tabs  Generic drug: Brivaracetam  Started by: Dr. Fredy Franklin   Doctor's comments: Request 60 tabs of 25 mg to cover 30 days plus 5 refills for a 180 days  Take 25 mg by mouth 2 times a day for 180 days.  Dose: 25 mg            CONTINUE taking these medications         Instructions   albuterol 108 (90 Base) MCG/ACT Aers inhalation aerosol   USE 2 INHALATIONS EVERY 6 HOURS AS NEEDED FOR SHORTNESS OF BREATH     folic acid 1 MG Tabs  Commonly known as: Folvite   Take 1 mg by mouth every day.  Dose: 1 mg     OMEGA-3 COMPLEX PO   Take  by mouth.     rosuvastatin 40 MG tablet  Commonly known as: Crestor   Take 1 Tablet by mouth every day.  Dose: 40 mg     Trelegy Ellipta 200-62.5-25 MCG/ACT inhaler  Generic drug: fluticasone-umeclidinium-vilanterol   Inhale 1 puff every day.  Dose: 1 Puff     VITAMIN B COMPLEX PO   Take  by mouth.     vitamin D 2000 UNIT Tabs   Take 2,000 Units by mouth every day.  Dose: 2,000 Units     Xcopri 200 MG Tabs  Generic drug: Cenobamate   Doctor's comments: Request 60 tabs of 200 mg to cover 30 days plus 5 refills for a total of 180 days of coverage  Take 400 mg by mouth at bedtime for 180 days. Indications: Partial Onset Seizure  Dose: 400 mg             Patient with intractable focal epilepsy that remains incompletely controlled on Xcopri monotherapy. There are limiting options for him. He has not tried Briviact so I am recommend he start 25 mg twice per day. I do consider this medication a medical necessity and his failure of multiple AEDs make this option even that much more important. Script sent to pharmacy      No other changes to hs health. He has sleep apnea that is not being treated due to his inability to tolerate CPAP. Discussed the option of adding tirzepatide to help with some weight loss which may improve/treat his sleep apnea and secondarily protect against seizures. He does not wish to do this right now. He would rather try dieting and exercise first.   Follow-up in 4 months      BILLING DOCUMENTATION:     The number of minutes of face-to-face time spent in this encounter was I spent a total of 30 minutes on the day of the visit.  . Over 50% of the time of the visit today was spent on counseling and/or coordination of care wtih the patient  and/or family, as outlined above in assessment in plan.    Fredy Franklin MD  Department of Neurology at [facility]  Diplomate of the American Board of Psychiatry and Neurology, General Neurology  Diplomate of American Board of Psychiatry and Neurology, a Member Board of the American Board of Medical Subspecialties, Epilepsy  Director of [facility] Level III Comprehensive Epilepsy Program  Professor of Clinical Neurology, [facility].   [address] [facility], [address]  [place] [id number] [phone]   Fax: [phone]  E-mail: [email or web address]

## 2025-01-02 NOTE — TELEPHONE ENCOUNTER
Received [employer] [place] request via [employer]  for Briviact. (Quantity:60, Day Supply:30)     Insurance: [employer]  Member ID:  [id number]  BIN: [id number]  PCN: [id number]  Group: [id number]     Ran Test claim via [place] & medication Rejects stating prior authorization is required.

## 2025-01-02 NOTE — TELEPHONE ENCOUNTER
Prior Authorization for Briviact (Quantity: 60, Days: 30) has been submitted via Cover My Meds: Key ([id number])    Insurance: [employer]    Will follow up in 24-48 business hours.

## 2025-01-02 NOTE — PATIENT INSTRUCTIONS
NEUROLOGY CLINIC VISIT WITH DR. FRANKLIN     PLEASE READ THIS ENTIRE DOCUMENT CAREFULLY AND COMPLETELY:    First and foremost, you matter to Dr. Franklin and you deserve the best care.   Dr. Franklin prides himself on providing the best possible care to all his patients. He strives to make each appointment meaningful, so that all your concerns are being addressed and all your neurological problems are being optimally treated. In order to achieve these goals for everyone, Dr. Franklin has listed important reminders and the best ways to prepare for each appointment. Please read each item carefully. Thank you!    Due to the high volume of patients we are trying to help, your physician will not be able to respond by phone or in [employer] to your routine concerns between appointments.  This does not reflect a lack of interest or concern for you or your diagnosis.  Please bring these questions and concerns to your appointment where your physician can answer.  Please relay more pressing concerns to our office, either via [employer], or by phone; if not able to reach us please visit nearby Urgent Care Center or Emergency Department.  If any emergent medical needs, please seek emergent medical help and/or call 911.    Also, please note that we are not able to fill out paperwork that might be related to your work, utility company, disability, and/or driving, among others, in between the visits.  Please schedule a dedicated appointment to address any and all paperwork.  This is not due to lack of concern or interest for your disease-related work/administrative problems, but to make sure that we provide the best possible care and to fill out your paperwork in a correct, complete, and timely manner.  ------------------------------------------------------------------------------------------  Please let our office know if you have any changes in your seizure frequency and/characteristics.     Please keep a diary of your seizures and bring it  with you to each appointment.    Please take vitamin D3 [id number] internation units daily.     Please abstain from driving until further notice    If you are a biological female with epilepsy who is of reproductive age, who is actively breastfeeding, and/or who infants/young children:  Please take folic acid 1 mg daily. This is an over-the-counter supplement that is recommended to prevent certain developmental problems in your baby, in case you become pregnant in the future.  It is critical that you let our office know as soon as you become pregnant or plan to become pregnant.  If you are caring for a baby/young child, please make sure to be sitting on a soft surface while holding your baby/young child, so in case you have a seizure, your baby/young child is not injured due to fall.   Please let us know if, while breastfeeding, you observe that your baby is excessively sleepy and/or has other behavioral changes. Because many antiseizure medications are collected in breast milk, some nursing babies can suffer adverse medication effects.    Please note that the following might precipitate seizures:   missed doses of antiseizure medications  being sick with a fever, stress  Fatigue  sleep deprivation or abnormal sleeping patterns  not eating regularly  not drinking enough water  drinking too much alcohol  stopping alcohol suddenly if you are currently using it on a regular/daily basis,   using recreational drugs, among others.    Please note that the following might lead to an injury or even be life-threatening in the event you have a seizure and/or lose awareness while:  being in a large body of water by yourself, such as bath, pool, lake, ocean, among others (risk of drowning)  being on unprotected heights (risk of fall)  being around and/or operating heavy machinery (risk of injury)  being around open fire/hot surfaces (risk of burns)  any other activities/circumstances, in which if you lose awareness, you might  injure yourself and/or others.  -------------------------------------------------------------------------------------------  SUDEP (SUDDEN UNEXPECTED DEATH IN EPILEPSY)  It is important that your seizures are well controlled and you have none or have them rarely. In addition to avoiding injury related to breakthrough seizures, frequent seizures increase risk of SUDEP (sudden unexpected death in epilepsy), where a person goes into a seizure and then never wakes up. The best way to prevent SUDEP is to control your seizures well.   ------------------------------------------------------------------------------------------  Please call for help (crisis line and/or 911) in case you have thoughts of harming yourself and/or others.  ------------------------------------------------------------------------------------------  INSTRUCTIONS FOR YOUR FAMILY/CAREGIVERS:  Please call 911 if the patient has a seizure longer than 2-3 minutes, if seizures are back to back without her recovering to her baseline, or she does not start recovering within 5-10 minutes after the seizure stops. During the seizure - please turn her on her side, please make sure her head is protected (for example, you should put a pillow under her head, if one is available), and please do not put anything in her mouth.   ------------------------------------------------------------------------------------------  PATIENT EXPECTATIONS,  IMPORTANT APPOINTMENT REMINDERS, AND ADDITIONAL HELPFUL TIPS:   REFILLS:   Request refills AT LEAST 1 week in advance to ensure you do not run out of medications    MyChart  It is STRONGLY encouraged that ALL patients sign up for MyChart. It is BY FAR the fastest and most convenient way for both Dr. Franklin and patients to obtain timely refills.  If you are having trouble signing up or logging into your account, staff are available to help you. Please ask a medical assistant or staff at the  to assist you.    TEST RESULS:    All labs and diagnostic test results will be reviewed at your next visit, UNLESS  Dr. Franklin determines that there are important findings on the tests need to be acted on sooner. Dr. Franklin will either call or send a message through [employer] if this is the case.    BE PREPARED PRIOR TO EVERY APPOINTMENT:  All patient are responsible for ensuring that ALL test results that were completed outside of the [facility] system have been received by our Neurology Department PRIOR to your appointment with Dr. Franklin.    IMPORTANT:  ALL images (not just the reports) must be sent and uploaded to the [facility] system. Dr. Franklin reviews all images personally prior to each visit. Ensuring that ALL the test results and test images are accessible to Dr. Franklin prior to your appointment is YOUR responsibility and an important part of making the most out of each appointment.   Bring a government-issues picture ID and an updated insurance card EVERY visit.  It is highly recommended that you bring at every visit a list of the most important topics that you want address. While it may not be possible to address all items on the list in a single visit, preparing a list will ensure that Dr. Franklin addresses the items that are most important to you and your health    PAPERWORK, DOCUMENTATION, LETTER REQUESTS:  You must notify the office ahead of your appointment of all paperwork or letter requests.   Please DO NOT wait until the last minute to make these requests. Please give all paperwork to the medical assistant at the start of the appointment and check-in process. Please note that Dr. Franklin may not be able complete some types of documentation in a single appointment or even within a single day or week. This is why it is important to communicate paperwork requests prior to your appointment and at least 2 weeks prior to any deadlines.    KNOW ALL YOU MEDICATIONS:   AT EVERY SINGLE APPOINTMENT, please bring a list of every single prescribed,  non-prescribed, and over the counter medication or supplements you are taking, including ones taken on a rare or intermittent basis.  Include the following information for each prescribed or non-prescribed medications:  Name of medication   The strength of EACH pill/capsule/tablet, etc.   The number of pills/capsules/tablets, etc taken per dose  The number and time of day that doses are taken  For every single Supplement that you take on a routine or intermittent basis, you must include:  The Brand Name   A complete list of every single ingredient, compound, vitamin, and/or mineral in each dose, along with the corresponding amounts/strengths of all ingredients, vitamins, minerals, etc., if such information is provided or known  The number of doses taken per day and time of day doses are taken  If medications are taken on an intermittent or as needed basis, please estimate how many days per week or days per month the medications are used  DO NOT just print out your medication list from [employer] or bring a list from a prior appointment or hospitalizations because the information is often often unreliable, inaccurate, outdated, and/or incomplete   The list should be printed or written  If you forget or do not have a list of all the medication, then it is acceptable, although less preferred, to bring all the bottles to the appointment     ARRIVE EARLY FOR ALL VISITS:  Please note that we are unable to accommodate late arrivals as per office policy.  YOU-the patient - (NOT a parent, spouse, or friend) must be physically present at check-in no later than 12 minutes after the scheduled appointment time, or you will be asked to reschedule   Consider scheduling a virtual appointment with Dr. Franklin through [employer] as an alternative if transportation to the clinic is difficult or unavailable   Please note, however, that virtual visits can only be scheduled after being an established patient of Dr. Franklin. All new appointments  "must be done in-person in clinic  Some insurances will not cover the cost of virtual appointments. Please check with your insurance to find out if these visits are covered    COMMUNICATING URGENT AND NON-URGENT MATTERS:  Your concerns are important and deserve to be heard and addressed. If you have an urgent matter, there are two methods that will ensure your concerns are prioritized appropriately:   Preferred method: Sign-up/Login to your [email or web address] account and send a message addressed to Dr. Franklin or Margret David (Dr. Franklin's assistant). In the subject line, type \"urgent\" followed by a word or phrase describing the situation (For example, write \"Urgent: Out of antiseuzre med and need refill\" or \"Urgent: Severe side effects to new meds\". In doing this, our staff can ensure urgent messages are triaged appropriately and communicated to Dr. Franklin that day.  Call [facility] Neurology main line at [phone]. Dr. Franklin's voicemail extension is [phone]. When leaving a voice message, specifically indicate if it is urgent (or non-urgent) so that the matter can be triaged appropriately and addressed in a timely manner    Thank you for entrusting your neurological care to [facility] Neurology and we look forward to continuing to serve you.   "

## 2025-01-03 PROCEDURE — RXMED WILLOW AMBULATORY MEDICATION CHARGE: Performed by: STUDENT IN AN ORGANIZED HEALTH CARE EDUCATION/TRAINING PROGRAM

## 2025-01-03 NOTE — TELEPHONE ENCOUNTER
Prior Authorization for Briviact has been approved for a quantity of 60 , day supply 30    Prior Authorization reference number: [id number]  Insurance: [employer]  Effective dates: 12/03/24-01/02/26  Copay: $10.00     Is patient eligible to fill with [facility]? Yes    Next Steps: The patient's copay exceeds $5.00. Proceed with contacting patient to offer financial assistance.

## 2025-01-06 PROCEDURE — RXMED WILLOW AMBULATORY MEDICATION CHARGE: Performed by: STUDENT IN AN ORGANIZED HEALTH CARE EDUCATION/TRAINING PROGRAM

## 2025-01-08 ENCOUNTER — PHARMACY VISIT (OUTPATIENT)
Dept: PHARMACY | Facility: MEDICAL CENTER | Age: 65
End: 2025-01-08
Payer: COMMERCIAL

## 2025-01-08 PROCEDURE — RXMED WILLOW AMBULATORY MEDICATION CHARGE: Performed by: PHYSICIAN ASSISTANT

## 2025-01-09 ENCOUNTER — PHARMACY VISIT (OUTPATIENT)
Dept: PHARMACY | Facility: MEDICAL CENTER | Age: 65
End: 2025-01-09
Payer: COMMERCIAL

## 2025-01-28 PROCEDURE — RXMED WILLOW AMBULATORY MEDICATION CHARGE: Performed by: STUDENT IN AN ORGANIZED HEALTH CARE EDUCATION/TRAINING PROGRAM

## 2025-02-06 PROCEDURE — RXMED WILLOW AMBULATORY MEDICATION CHARGE: Performed by: STUDENT IN AN ORGANIZED HEALTH CARE EDUCATION/TRAINING PROGRAM

## 2025-02-07 ENCOUNTER — PHARMACY VISIT (OUTPATIENT)
Dept: PHARMACY | Facility: MEDICAL CENTER | Age: 65
End: 2025-02-07
Payer: COMMERCIAL

## 2025-02-11 ENCOUNTER — PHARMACY VISIT (OUTPATIENT)
Dept: PHARMACY | Facility: MEDICAL CENTER | Age: 65
End: 2025-02-11
Payer: COMMERCIAL

## 2025-03-02 PROCEDURE — RXMED WILLOW AMBULATORY MEDICATION CHARGE: Performed by: STUDENT IN AN ORGANIZED HEALTH CARE EDUCATION/TRAINING PROGRAM

## 2025-03-03 PROCEDURE — RXMED WILLOW AMBULATORY MEDICATION CHARGE: Performed by: STUDENT IN AN ORGANIZED HEALTH CARE EDUCATION/TRAINING PROGRAM

## 2025-03-05 ENCOUNTER — PHARMACY VISIT (OUTPATIENT)
Dept: PHARMACY | Facility: MEDICAL CENTER | Age: 65
End: 2025-03-05
Payer: COMMERCIAL

## 2025-03-06 ENCOUNTER — PHARMACY VISIT (OUTPATIENT)
Dept: PHARMACY | Facility: MEDICAL CENTER | Age: 65
End: 2025-03-06
Payer: COMMERCIAL

## 2025-04-02 PROCEDURE — RXMED WILLOW AMBULATORY MEDICATION CHARGE: Performed by: PHYSICIAN ASSISTANT

## 2025-04-02 PROCEDURE — RXMED WILLOW AMBULATORY MEDICATION CHARGE: Performed by: STUDENT IN AN ORGANIZED HEALTH CARE EDUCATION/TRAINING PROGRAM

## 2025-04-04 ENCOUNTER — PHARMACY VISIT (OUTPATIENT)
Dept: PHARMACY | Facility: MEDICAL CENTER | Age: 65
End: 2025-04-04
Payer: COMMERCIAL

## 2025-04-07 ENCOUNTER — PHARMACY VISIT (OUTPATIENT)
Dept: PHARMACY | Facility: MEDICAL CENTER | Age: 65
End: 2025-04-07
Payer: MEDICARE

## 2025-04-15 ENCOUNTER — OFFICE VISIT (OUTPATIENT)
Dept: MEDICAL GROUP | Facility: IMAGING CENTER | Age: 65
End: 2025-04-15
Payer: MEDICARE

## 2025-04-15 VITALS
SYSTOLIC BLOOD PRESSURE: 118 MMHG | WEIGHT: 177 LBS | HEIGHT: 68 IN | OXYGEN SATURATION: 100 % | TEMPERATURE: 97.8 F | RESPIRATION RATE: 16 BRPM | BODY MASS INDEX: 26.83 KG/M2 | DIASTOLIC BLOOD PRESSURE: 68 MMHG | HEART RATE: 63 BPM

## 2025-04-15 DIAGNOSIS — E78.5 DYSLIPIDEMIA: ICD-10-CM

## 2025-04-15 DIAGNOSIS — Z00.00 ENCOUNTER FOR ANNUAL WELLNESS EXAM IN MEDICARE PATIENT: ICD-10-CM

## 2025-04-15 DIAGNOSIS — J44.9 CHRONIC OBSTRUCTIVE PULMONARY DISEASE, UNSPECIFIED COPD TYPE (HCC): ICD-10-CM

## 2025-04-15 DIAGNOSIS — Z12.5 PROSTATE CANCER SCREENING: ICD-10-CM

## 2025-04-15 DIAGNOSIS — G40.219 INTRACTABLE FOCAL EPILEPSY WITH IMPAIRMENT OF CONSCIOUSNESS (HCC): Chronic | ICD-10-CM

## 2025-04-15 DIAGNOSIS — G47.9 DIFFICULTY SLEEPING: ICD-10-CM

## 2025-04-15 PROCEDURE — 3074F SYST BP LT 130 MM HG: CPT | Performed by: PHYSICIAN ASSISTANT

## 2025-04-15 PROCEDURE — 1126F AMNT PAIN NOTED NONE PRSNT: CPT | Performed by: PHYSICIAN ASSISTANT

## 2025-04-15 PROCEDURE — RXMED WILLOW AMBULATORY MEDICATION CHARGE: Performed by: PHYSICIAN ASSISTANT

## 2025-04-15 PROCEDURE — G0439 PPPS, SUBSEQ VISIT: HCPCS | Performed by: PHYSICIAN ASSISTANT

## 2025-04-15 PROCEDURE — 3078F DIAST BP <80 MM HG: CPT | Performed by: PHYSICIAN ASSISTANT

## 2025-04-15 RX ORDER — FLUTICASONE FUROATE, UMECLIDINIUM BROMIDE AND VILANTEROL TRIFENATATE 200; 62.5; 25 UG/1; UG/1; UG/1
1 POWDER RESPIRATORY (INHALATION) DAILY
Qty: 180 EACH | Refills: 3 | Status: SHIPPED | OUTPATIENT
Start: 2025-04-15

## 2025-04-15 RX ORDER — TRAZODONE HYDROCHLORIDE 50 MG/1
50 TABLET ORAL NIGHTLY
Qty: 90 TABLET | Refills: 0 | Status: SHIPPED | OUTPATIENT
Start: 2025-04-15

## 2025-04-15 RX ORDER — ROSUVASTATIN CALCIUM 40 MG/1
40 TABLET, COATED ORAL DAILY
Qty: 90 TABLET | Refills: 3 | Status: SHIPPED | OUTPATIENT
Start: 2025-04-15

## 2025-04-15 ASSESSMENT — ACTIVITIES OF DAILY LIVING (ADL): BATHING_REQUIRES_ASSISTANCE: 0

## 2025-04-15 ASSESSMENT — ENCOUNTER SYMPTOMS: GENERAL WELL-BEING: FAIR

## 2025-04-15 ASSESSMENT — PAIN SCALES - GENERAL: PAINLEVEL_OUTOF10: NO PAIN

## 2025-04-15 ASSESSMENT — PATIENT HEALTH QUESTIONNAIRE - PHQ9: CLINICAL INTERPRETATION OF PHQ2 SCORE: 0

## 2025-04-15 ASSESSMENT — FIBROSIS 4 INDEX: FIB4 SCORE: 1.06

## 2025-04-15 NOTE — PROGRESS NOTES
Chief Complaint   Patient presents with    Annual Exam     Medicare        HPI:  Fredy Zavala is a 64 y.o. here for Medicare Annual Wellness Visit     Patient Active Problem List    Diagnosis Date Noted    Restless sleeper 10/09/2024    Low folate 10/09/2024    Hyperglycemia 04/04/2024    Abnormal RBC 04/04/2024    Low serum vitamin B12 04/04/2024    Actinic keratosis 09/28/2023    Sleep apnea 12/08/2022    Hypertrophy of uvula 12/08/2022    Tremor due to multiple drugs 02/01/2021    Intractable focal epilepsy with impairment of consciousness ([facility])     History of prostate cancer 07/25/2012    Dyslipidemia     COPD (chronic obstructive pulmonary disease) ([facility])        Current Outpatient Medications   Medication Sig Dispense Refill    rosuvastatin (CRESTOR) 40 MG tablet Take 1 Tablet by mouth every day. 90 Tablet 3    fluticasone-umeclidinium-vilanterol (TRELEGY ELLIPTA) 200-62.5-25 mcg/act inhaler Inhale 1 puff every day. 180 Each 3    traZODone (DESYREL) 50 MG Tab Take 1 Tablet by mouth every evening. 90 Tablet 0    Brivaracetam (BRIVIACT) 25 MG Tab Take 1 tablet by mouth 2 times a day. 60 Tablet 5    Cenobamate (XCOPRI) 200 MG Tab Take 2 tabs (400 mg) by mouth at bedtime for 180 days. Indications: Partial Onset Seizure 60 Tablet 5    B Complex Vitamins (VITAMIN B COMPLEX PO) Take  by mouth.      folic acid (FOLVITE) 1 MG Tab Take 1 mg by mouth every day.      albuterol 108 (90 Base) MCG/ACT Aero Soln inhalation aerosol USE 2 INHALATIONS EVERY 6 HOURS AS NEEDED FOR SHORTNESS OF BREATH 6.7 g 3    DHA-EPA-Vitamin E (OMEGA-3 COMPLEX PO) Take  by mouth.      Cholecalciferol (VITAMIN D) 2000 UNIT Tab Take 2,000 Units by mouth every day.       No current facility-administered medications for this visit.          Current supplements as per medication list.     Allergies: Patient has no known allergies.    Current social contact/activities: none     He  reports that he has never smoked. He has never used smokeless  tobacco. He reports that he does not currently use alcohol. He reports that he does not use drugs.  Counseling given: Not Answered      ROS:    Gait: Uses no assistive device  Ostomy: No  Other tubes: No  Amputations: No  Chronic oxygen use: No  Last eye exam: years ago   Wears hearing aids: No   : Denies any urinary leakage during the last 6 months    Screening: UTD  Depression Screening  Little interest or pleasure in doing things?  0 - not at all  Feeling down, depressed , or hopeless? 0 - not at all  Patient Health Questionnaire Score: 0     If depressive symptoms identified deferred to follow up visit unless specifically addressed in assessment and plan.    Interpretation of PHQ-9 Total Score   Score Severity   1-4 No Depression   5-9 Mild Depression   10-14 Moderate Depression   15-19 Moderately Severe Depression   20-27 Severe Depression    Screening for Cognitive Impairment  Do you or any of your friends or family members have any concern about your memory? No  Three Minute Recall (Village, Kitchen, Baby) 3/3    Rj clock face with all 12 numbers and set the hands to show 10 minutes past 11.  Yes    Cognitive concerns identified deferred for follow up unless specifically addressed in assessment and plan.    Fall Risk Assessment  Has the patient had two or more falls in the last year or any fall with injury in the last year?  No    Safety Assessment  Do you always wear your seatbelt?  No  Any changes to home needed to function safely? No  Difficulty hearing.  Yes  Patient counseled about all safety risks that were identified.    Functional Assessment ADLs  Are there any barriers preventing you from cooking for yourself or meeting nutritional needs?  No.    Are there any barriers preventing you from driving safely or obtaining transportation?  No.    Are there any barriers preventing you from using a telephone or calling for help?  No    Are there any barriers preventing you from shopping?  No.    Are there  any barriers preventing you from taking care of your own finances?  No    Are there any barriers preventing you from managing your medications?  No    Are there any barriers preventing you from showering, bathing or dressing yourself? No    Are there any barriers preventing you from doing housework or laundry? No  Are there any barriers preventing you from using the toilet?No  Are you currently engaging in any exercise or physical activity?  Yes. Daily walks, bike rides     Self-Assessment of Health  What is your perception of your health? Fair    Do you sleep more than six hours a night? No    In the past 7 days, how much did pain keep you from doing your normal work? None    Do you spend quality time with family or friends (virtually or in person)? No    Do you usually eat a heart healthy diet that constists of a variety of fruits, vegetables, whole grains and fiber? Yes    Do you eat foods high in fat and/or Fast Food more than three times per week? No    How concerned are you that your medical conditions are not being well managed? Not at all    Are you worried that in the next 2 months, you may not have stable housing that you own, rent, or stay in as part of a household? No      Advance Care Planning  Do you have an Advance Directive, Living Will, Durable Power of , or POLST? No                 Health Maintenance Summary            Current Care Gaps       Annual Pulmonary Function Test / Spirometry (Yearly) Overdue since 9/24/2023 09/24/2022  PFT DICTATED RESULTS    02/06/2009  PFT DICTATED RESULTS              COVID-19 Vaccine (5 - 2024-25 season) Overdue since 9/1/2024      10/13/2022  Imm Admin: PFIZER BIVALENT SARS-COV-2 VACCINE (12+)    12/07/2021  Imm Admin: MODERNA SARS-COV-2 VACCINE (12+)    04/16/2021  Imm Admin: PFIZER PURPLE CAP SARS-COV-2 VACCINATION (12+)    03/20/2021  Imm Admin: PFIZER PURPLE CAP SARS-COV-2 VACCINATION (12+)                      Needs Review       Hepatitis C  Screening  Tentatively Complete      08/13/2020  Hepatitis C Antibody component of HEP C VIRUS ANTIBODY                      Upcoming       Annual Wellness Visit (Yearly) Next due on 4/15/2026      04/15/2025  Done    04/04/2024  Done    03/31/2023  Visit Dx: Medicare annual wellness visit, initial    03/31/2023  Level of Service: [place] INITIAL ANNU[place] WELLNESS VISIT-INCLUDES PPPS    09/09/2021  Level of Service: [place] PREVENTIVE VISIT,EST,65 & OVER     Only the first 5 history entries have been loaded, but more history exists.            Colorectal Cancer Screening (Colonoscopy - Every 5 Years) Next due on 1/4/2028 01/04/2023  AMB EXTERNAL COLONOSCOPY RESULTS    12/04/2012  Colonoscopy (Previously completed - 8/10)              IMM DTaP/Tdap/Td Vaccine (3 - Td or Tdap) Next due on 8/15/2034      08/15/2024  Imm Admin: Tdap Vaccine    07/02/2014  Imm Admin: Tdap Vaccine                      Completed or No Longer Recommended       Influenza Vaccine (Series Information) Completed      10/09/2024  Imm Admin: Influenza high-dose trivalent (PF)    09/27/2023  Imm Admin: Influenza Vaccine Quad Inj (Pf)    01/08/2022  Imm Admin: Influenza Vaccine Quad Inj (Pf)    11/22/2019  Imm Admin: Influenza Vaccine Quad Inj (Pf)    11/01/2016  Imm Admin: Influenza Vaccine Quad Inj (Preserved)      Only the first 5 history entries have been loaded, but more history exists.              Zoster (Shingles) Vaccines (Series Information) Completed      03/17/2023  Imm Admin: Zoster Vaccine Recombinant (RZV) (SHINGRIX)    11/01/2022  Imm Admin: Zoster Vaccine Live (ZVL) (Zostavax) - HISTORICAL DATA    10/13/2022  Imm Admin: Zoster Vaccine Recombinant (RZV) (SHINGRIX)              Pneumococcal Vaccine: 50+ Years (Series Information) Completed      11/01/2022  Imm Admin: Pneumococcal polysaccharide vaccine (PPSV-23)    09/21/2022  Imm Admin: Pneumococcal Conjugate Vaccine (PCV20)    02/28/2019  Imm Admin: Pneumococcal Conjugate Vaccine  (Prevnar/PCV-13)              Hepatitis A Vaccine (Hep A) (Series Information) Aged Out      No completion history exists for this topic.              Hepatitis B Vaccine (Hep B) (Series Information) Aged Out     No completion history exists for this topic.              HPV Vaccines (Series Information) Aged Out     No completion history exists for this topic.              Polio Vaccine (Inactivated Polio) (Series Information) Aged Out     No completion history exists for this topic.              Meningococcal Immunization (Series Information) Aged Out     No completion history exists for this topic.              HIV Screening  Discontinued     No completion history exists for this topic.                          Patient Care Team:  Colleen Merlos P.A.-C. as PCP - General (Physician Assistant)  Fredy Franklin M.D. as Attending Team Physician (Neurology)    Social History     Tobacco Use    Smoking status: Never    Smokeless tobacco: Never   Vaping Use    Vaping status: Never Used   Substance Use Topics    Alcohol use: Not Currently     Comment: stopped drinking 5 years ago    Drug use: Never     Comment: previous marijuana     Family History   Problem Relation Age of Onset    Alcohol abuse Mother         cirrhosis    Alcohol abuse Father     Psychiatric Illness Father     Diabetes Father     Alcohol/Drug Brother     Cancer Neg Hx     Stroke Neg Hx     Heart Disease Neg Hx      He  has a past medical history of Asthma, Cancer (HCC) (05/2012), COPD (chronic obstructive pulmonary disease) (HCC), Dyslipidemia, and Partial epilepsy with impairment of consciousness (HCC).   Past Surgical History:   Procedure Laterality Date    PROSTATECTOMY ROBOTIC  05/29/2012    Performed by JACOB RAMOS at SURGERY [facility] ORS    COLONOSCOPY  08/01/2010    grade 1 int. hemorrhoids    PROSTATECTOMY, RADICAL RETRO         Exam:   /68 (BP Location: Right arm, Patient Position: Sitting, BP Cuff Size: Adult)   Pulse 63    "Temp 36.6 °C (97.8 °F) (Temporal)   Resp 16   Ht 1.727 m (5' 8\")   Wt 80.3 kg (177 lb)   SpO2 100%  Body mass index is 26.91 kg/m².    Hearing good.    Dentition fair  Alert, oriented in no acute distress.  Eye contact is good, speech goal directed, affect calm    Assessment and Plan. The following treatment and monitoring plan is recommended:      1. Encounter for annual wellness exam in Medicare patient  PMH/PSH/FH/Social history reviewed.  Vaccinations discussed.  Previous records and labs reviewed. Discussed age appropriate anticipatory guidance.    2. Intractable focal epilepsy with impairment of consciousness (HCC)  Chronic, controlled and stable. Continue current regimen per neurology.  On Briviact 25 mg twice a day and Cenobamate 400 mg qHS.     3. Chronic obstructive pulmonary disease, unspecified COPD type (HCC)  Chronic, controlled and stable. Continue current regimen -   - fluticasone-umeclidinium-vilanterol (TRELEGY ELLIPTA) 200-62.5-25 mcg/act inhaler; Inhale 1 puff every day.  Dispense: 180 Each; Refill: 3    4. Dyslipidemia  Chronic, controlled and stable. Continue current regimen -   - rosuvastatin (CRESTOR) 40 MG tablet; Take 1 Tablet by mouth every day.  Dispense: 90 Tablet; Refill: 3    5. Difficulty sleeping  Chronic, uncontrolled.  Patient admits to chronic restless sleep.  Able to fall asleep, but not stay asleep.  Will trial trazodone.  Take 1 hour before bed.  - traZODone (DESYREL) 50 MG Tab; Take 1 Tablet by mouth every evening.  Dispense: 90 Tablet; Refill: 0    6. Prostate cancer screening  - PROSTATE SPECIFIC AG SCREENING; Future    Services suggested: No services needed at this time  Health Care Screening: Age-appropriate preventive services recommended by USPTF and ACIP covered by Medicare were discussed today. Services ordered if indicated and agreed upon by the patient.  Referrals offered: Community-based lifestyle interventions to reduce health risks and promote self-management " and wellness, fall prevention, nutrition, physical activity, tobacco-use cessation, weight loss, and mental health services as per orders if indicated.    Discussion today about general wellness and lifestyle habits:    Prevent falls and reduce trip hazards; Cautioned about securing or removing rugs.  Have a working fire alarm and carbon monoxide detector;   Engage in regular physical activity and social activities     Follow-up: Return in about 11 weeks (around 7/1/2025) for Medication recheck.

## 2025-04-15 NOTE — PATIENT INSTRUCTIONS
It was a pleasure meeting with you today at [facility]!    Your medical history/records and medications were reviewed today.     UPDATE on MyChart Results: If you have blood work, and/or imaging studies, or any other test or procedure completed, you will have access to results as soon as they become available in MyChart. Recently, these results will be available for review at the same time that your provider is able to see results!    This will likely mean you will see a result before your provider has had a chance to review and discuss with you.  Some results or care notes may be hard to understand and may be serious in nature.    We look at every result and your provider will contact you to explain what they mean and discuss appropriate next steps. Please allow for at least 72 business hours for chart and result review.     We prefer that you wait for your care team to contact you with your results.  Often, your provider will discuss your results with you at your next appointment. We look forward to continuing to partner with you in your care.    Please review my practice information below:    If you have any prescription refill requests, please send them via [employer] or discuss with your provider at the start of your office visits. Please allow 3-5 business days for lab and testing review and you will be contacted via [employer] with those results, or if advised to make a follow up appointment regarding those results, then please do so.     Once resulted, your lab/test/imaging results will show up automatically in your MyChart. Please wait for my interpretation and recommendations prior to viewing your results to avoid any unnecessary confusion or misinterpretation. I will address all of the lab values that I interpret as abnormal and message you accordingly on your MyChart. I will always send you a message about your results even if they are normal. If you do not hear back from me within 5-7 business  days after completing your tests, then please send me a message on [employer] so I can obtain your results (especially if you went to an outside lab or imaging center - [facility], [facility], etc).     If you have any additional questions or concerns beyond my interpretation of your results, please make an appointment with me to discuss in further detail.    Please only use the [employer] messaging system for questions regarding your most recent appointment or if advised to use otherwise (glucose or blood pressure reporting).     If you have any new problems or concerns, you must make an appointment to discuss. This includes any referral requests, lab requests (unless advised to notify me for pre-appt labs), medication side effects, or request for medication adjustments.     Please arrive 15 minutes prior to your appointment time to complete your check-in and intake with the medical assistant.      Thank you,    Colleen Merlos PA-C ([place])  Physician Assistant Certified  [facility]    -----------------------------------------------------------------    Attn: Patients of [facility]:    In an effort to continue to provide excellent and efficient care to our patients, it is vital that we continue to use our resources appropriately. With that, this is a reminder that [employer] is used for prescription refill requests, test results, virtual visits, and chart review only.     Any new questions, concerns/conditions, lab/imaging requests, medication adjustments, new prescriptions, or referral requests do require an appointment (virtually or in person), unless discussed otherwise at your most recent appointment.     Thank you for your understanding,    [facility]

## 2025-04-18 ENCOUNTER — PHARMACY VISIT (OUTPATIENT)
Dept: PHARMACY | Facility: MEDICAL CENTER | Age: 65
End: 2025-04-18
Payer: COMMERCIAL

## 2025-04-29 DIAGNOSIS — G47.9 DIFFICULTY SLEEPING: ICD-10-CM

## 2025-04-29 PROCEDURE — RXMED WILLOW AMBULATORY MEDICATION CHARGE: Performed by: STUDENT IN AN ORGANIZED HEALTH CARE EDUCATION/TRAINING PROGRAM

## 2025-04-29 RX ORDER — TRAZODONE HYDROCHLORIDE 50 MG/1
50 TABLET ORAL NIGHTLY
Qty: 90 TABLET | Refills: 0 | OUTPATIENT
Start: 2025-04-29

## 2025-05-05 ENCOUNTER — PHARMACY VISIT (OUTPATIENT)
Dept: PHARMACY | Facility: MEDICAL CENTER | Age: 65
End: 2025-05-05
Payer: COMMERCIAL

## 2025-05-06 ENCOUNTER — HOSPITAL ENCOUNTER (OUTPATIENT)
Dept: LAB | Facility: MEDICAL CENTER | Age: 65
End: 2025-05-06
Attending: PHYSICIAN ASSISTANT
Payer: MEDICARE

## 2025-05-06 DIAGNOSIS — Z12.5 PROSTATE CANCER SCREENING: ICD-10-CM

## 2025-05-06 LAB — PSA SERPL DL<=0.01 NG/ML-MCNC: <0.02 NG/ML (ref 0–4)

## 2025-05-06 PROCEDURE — 84153 ASSAY OF PSA TOTAL: CPT | Mod: GA

## 2025-05-06 PROCEDURE — 36415 COLL VENOUS BLD VENIPUNCTURE: CPT

## 2025-05-15 ENCOUNTER — OFFICE VISIT (OUTPATIENT)
Dept: NEUROLOGY | Facility: MEDICAL CENTER | Age: 65
End: 2025-05-15
Attending: STUDENT IN AN ORGANIZED HEALTH CARE EDUCATION/TRAINING PROGRAM
Payer: MEDICARE

## 2025-05-15 VITALS
HEART RATE: 60 BPM | BODY MASS INDEX: 27 KG/M2 | HEIGHT: 68 IN | WEIGHT: 178.13 LBS | OXYGEN SATURATION: 96 % | DIASTOLIC BLOOD PRESSURE: 72 MMHG | SYSTOLIC BLOOD PRESSURE: 122 MMHG | TEMPERATURE: 96.9 F

## 2025-05-15 DIAGNOSIS — G40.219 INTRACTABLE FOCAL EPILEPSY WITH IMPAIRMENT OF CONSCIOUSNESS (HCC): Primary | ICD-10-CM

## 2025-05-15 DIAGNOSIS — J44.9 CHRONIC OBSTRUCTIVE PULMONARY DISEASE, UNSPECIFIED COPD TYPE (HCC): ICD-10-CM

## 2025-05-15 DIAGNOSIS — Z85.46 HISTORY OF PROSTATE CANCER: ICD-10-CM

## 2025-05-15 DIAGNOSIS — G25.2 CEREBELLAR TREMOR: ICD-10-CM

## 2025-05-15 DIAGNOSIS — G47.00 INSOMNIA, UNSPECIFIED TYPE: ICD-10-CM

## 2025-05-15 DIAGNOSIS — G47.30 SLEEP APNEA, UNSPECIFIED TYPE: ICD-10-CM

## 2025-05-15 DIAGNOSIS — K13.79 HYPERTROPHY OF UVULA: ICD-10-CM

## 2025-05-15 DIAGNOSIS — G25.2 KINETIC TREMOR: ICD-10-CM

## 2025-05-15 DIAGNOSIS — G40.209 PARTIAL EPILEPSY WITH IMPAIRMENT OF CONSCIOUSNESS (HCC): ICD-10-CM

## 2025-05-15 PROCEDURE — 99213 OFFICE O/P EST LOW 20 MIN: CPT | Performed by: STUDENT IN AN ORGANIZED HEALTH CARE EDUCATION/TRAINING PROGRAM

## 2025-05-15 PROCEDURE — 3078F DIAST BP <80 MM HG: CPT | Performed by: STUDENT IN AN ORGANIZED HEALTH CARE EDUCATION/TRAINING PROGRAM

## 2025-05-15 PROCEDURE — 3074F SYST BP LT 130 MM HG: CPT | Performed by: STUDENT IN AN ORGANIZED HEALTH CARE EDUCATION/TRAINING PROGRAM

## 2025-05-15 PROCEDURE — RXMED WILLOW AMBULATORY MEDICATION CHARGE: Performed by: STUDENT IN AN ORGANIZED HEALTH CARE EDUCATION/TRAINING PROGRAM

## 2025-05-15 RX ORDER — CENOBAMATE 200 MG/1
400 TABLET, FILM COATED ORAL
Qty: 60 TABLET | Refills: 5 | Status: SHIPPED | OUTPATIENT
Start: 2025-05-15 | End: 2025-11-11

## 2025-05-15 ASSESSMENT — PATIENT HEALTH QUESTIONNAIRE - PHQ9
SUM OF ALL RESPONSES TO PHQ QUESTIONS 1-9: 3
5. POOR APPETITE OR OVEREATING: 0 - NOT AT ALL
CLINICAL INTERPRETATION OF PHQ2 SCORE: 1

## 2025-05-15 ASSESSMENT — FIBROSIS 4 INDEX: FIB4 SCORE: 1.06

## 2025-05-15 NOTE — PATIENT INSTRUCTIONS
NEUROLOGY CLINIC VISIT WITH DR. FRANKLIN     PLEASE READ THIS ENTIRE DOCUMENT CAREFULLY AND COMPLETELY:    First and foremost, you matter to Dr. Franklin and you deserve the best care.   Dr. Franklin prides himself on providing the best possible care to all his patients. He strives to make each appointment meaningful, so that all your concerns are being addressed and all your neurological problems are being optimally treated. In order to achieve these goals for everyone, Dr. Franklin has listed important reminders and the best ways to prepare for each appointment. Please read each item carefully. Thank you!    Due to the high volume of patients we are trying to help, your physician will not be able to respond by phone or in [employer] to your routine concerns between appointments.  This does not reflect a lack of interest or concern for you or your diagnosis.  Please bring these questions and concerns to your appointment where your physician can answer.  Please relay more pressing concerns to our office, either via [employer], or by phone; if not able to reach us please visit nearby Urgent Care Center or Emergency Department.  If any emergent medical needs, please seek emergent medical help and/or call 911.    Also, please note that we are not able to fill out paperwork that might be related to your work, utility company, disability, and/or driving, among others, in between the visits.  Please schedule a dedicated appointment to address any and all paperwork.  This is not due to lack of concern or interest for your disease-related work/administrative problems, but to make sure that we provide the best possible care and to fill out your paperwork in a correct, complete, and timely manner.  ------------------------------------------------------------------------------------------  Please let our office know if you have any changes in your seizure frequency and/characteristics.     Please keep a diary of your seizures and bring it  with you to each appointment.    Please take vitamin D3 [id number] internation units daily.     Please abstain from driving until further notice    If you are a biological female with epilepsy who is of reproductive age, who is actively breastfeeding, and/or who infants/young children:  Please take folic acid 1 mg daily. This is an over-the-counter supplement that is recommended to prevent certain developmental problems in your baby, in case you become pregnant in the future.  It is critical that you let our office know as soon as you become pregnant or plan to become pregnant.  If you are caring for a baby/young child, please make sure to be sitting on a soft surface while holding your baby/young child, so in case you have a seizure, your baby/young child is not injured due to fall.   Please let us know if, while breastfeeding, you observe that your baby is excessively sleepy and/or has other behavioral changes. Because many antiseizure medications are collected in breast milk, some nursing babies can suffer adverse medication effects.    Please note that the following might precipitate seizures:   missed doses of antiseizure medications  being sick with a fever, stress  Fatigue  sleep deprivation or abnormal sleeping patterns  not eating regularly  not drinking enough water  drinking too much alcohol  stopping alcohol suddenly if you are currently using it on a regular/daily basis,   using recreational drugs, among others.    Please note that the following might lead to an injury or even be life-threatening in the event you have a seizure and/or lose awareness while:  being in a large body of water by yourself, such as bath, pool, lake, ocean, among others (risk of drowning)  being on unprotected heights (risk of fall)  being around and/or operating heavy machinery (risk of injury)  being around open fire/hot surfaces (risk of burns)  any other activities/circumstances, in which if you lose awareness, you might  injure yourself and/or others.  -------------------------------------------------------------------------------------------  SUDEP (SUDDEN UNEXPECTED DEATH IN EPILEPSY)  It is important that your seizures are well controlled and you have none or have them rarely. In addition to avoiding injury related to breakthrough seizures, frequent seizures increase risk of SUDEP (sudden unexpected death in epilepsy), where a person goes into a seizure and then never wakes up. The best way to prevent SUDEP is to control your seizures well.   ------------------------------------------------------------------------------------------  Please call for help (crisis line and/or 911) in case you have thoughts of harming yourself and/or others.  ------------------------------------------------------------------------------------------  INSTRUCTIONS FOR YOUR FAMILY/CAREGIVERS:  Please call 911 if the patient has a seizure longer than 2-3 minutes, if seizures are back to back without her recovering to her baseline, or she does not start recovering within 5-10 minutes after the seizure stops. During the seizure - please turn her on her side, please make sure her head is protected (for example, you should put a pillow under her head, if one is available), and please do not put anything in her mouth.   ------------------------------------------------------------------------------------------  PATIENT EXPECTATIONS,  IMPORTANT APPOINTMENT REMINDERS, AND ADDITIONAL HELPFUL TIPS:   REFILLS:   Request refills AT LEAST 1 week in advance to ensure you do not run out of medications    MyChart  It is STRONGLY encouraged that ALL patients sign up for MyChart. It is BY FAR the fastest and most convenient way for both Dr. Franklin and patients to obtain timely refills.  If you are having trouble signing up or logging into your account, staff are available to help you. Please ask a medical assistant or staff at the  to assist you.    TEST RESULS:    All labs and diagnostic test results will be reviewed at your next visit, UNLESS  Dr. Franklin determines that there are important findings on the tests need to be acted on sooner. Dr. Franklin will either call or send a message through [employer] if this is the case.    BE PREPARED PRIOR TO EVERY APPOINTMENT:  All patient are responsible for ensuring that ALL test results that were completed outside of the [facility] system have been received by our Neurology Department PRIOR to your appointment with Dr. Franklin.    IMPORTANT:  ALL images (not just the reports) must be sent and uploaded to the [facility] system. Dr. Franklin reviews all images personally prior to each visit. Ensuring that ALL the test results and test images are accessible to Dr. Franklin prior to your appointment is YOUR responsibility and an important part of making the most out of each appointment.   Bring a government-issues picture ID and an updated insurance card EVERY visit.  It is highly recommended that you bring at every visit a list of the most important topics that you want address. While it may not be possible to address all items on the list in a single visit, preparing a list will ensure that Dr. Franklin addresses the items that are most important to you and your health    PAPERWORK, DOCUMENTATION, LETTER REQUESTS:  You must notify the office ahead of your appointment of all paperwork or letter requests.   Please DO NOT wait until the last minute to make these requests. Please give all paperwork to the medical assistant at the start of the appointment and check-in process. Please note that Dr. Franklin may not be able complete some types of documentation in a single appointment or even within a single day or week. This is why it is important to communicate paperwork requests prior to your appointment and at least 2 weeks prior to any deadlines.    KNOW ALL YOU MEDICATIONS:   AT EVERY SINGLE APPOINTMENT, please bring a list of every single prescribed,  non-prescribed, and over the counter medication or supplements you are taking, including ones taken on a rare or intermittent basis.  Include the following information for each prescribed or non-prescribed medications:  Name of medication   The strength of EACH pill/capsule/tablet, etc.   The number of pills/capsules/tablets, etc taken per dose  The number and time of day that doses are taken  For every single Supplement that you take on a routine or intermittent basis, you must include:  The Brand Name   A complete list of every single ingredient, compound, vitamin, and/or mineral in each dose, along with the corresponding amounts/strengths of all ingredients, vitamins, minerals, etc., if such information is provided or known  The number of doses taken per day and time of day doses are taken  If medications are taken on an intermittent or as needed basis, please estimate how many days per week or days per month the medications are used  DO NOT just print out your medication list from [employer] or bring a list from a prior appointment or hospitalizations because the information is often often unreliable, inaccurate, outdated, and/or incomplete   The list should be printed or written  If you forget or do not have a list of all the medication, then it is acceptable, although less preferred, to bring all the bottles to the appointment     ARRIVE EARLY FOR ALL VISITS:  Please note that we are unable to accommodate late arrivals as per office policy.  YOU-the patient - (NOT a parent, spouse, or friend) must be physically present at check-in no later than 12 minutes after the scheduled appointment time, or you will be asked to reschedule   Consider scheduling a virtual appointment with Dr. Franklin through [employer] as an alternative if transportation to the clinic is difficult or unavailable   Please note, however, that virtual visits can only be scheduled after being an established patient of Dr. Franklin. All new appointments  "must be done in-person in clinic  Some insurances will not cover the cost of virtual appointments. Please check with your insurance to find out if these visits are covered    COMMUNICATING URGENT AND NON-URGENT MATTERS:  Your concerns are important and deserve to be heard and addressed. If you have an urgent matter, there are two methods that will ensure your concerns are prioritized appropriately:   Preferred method: Sign-up/Login to your [employer] account and send a message addressed to Dr. Franklin or Margret David (Dr. Franklin's assistant). In the subject line, type \"urgent\" followed by a word or phrase describing the situation (For example, write \"Urgent: Out of antiseuzre med and need refill\" or \"Urgent: Severe side effects to new meds\". In doing this, our staff can ensure urgent messages are triaged appropriately and communicated to Dr. Franklin that day.  Call [facility] Neurology main line at [phone]. Dr. Franklin's voicemail extension is [phone]. When leaving a voice message, specifically indicate if it is urgent (or non-urgent) so that the matter can be triaged appropriately and addressed in a timely manner    Thank you for entrusting your neurological care to [facility] and we look forward to continuing to serve you.   "

## 2025-05-15 NOTE — PROGRESS NOTES
"NEUROLOGY FOLLOW-UP - 05/15/2025     REASON FOR VISIT: Fredy Zavala 64 y.o. male presents today for follow-up     SUMMARY RELEVANT PAST MEDICAL HISTORY   See prior notes    COMPENDIUM OF RELEVANT WORK-UP AND TREATMENTS TO DATE:  See prior notes/results    INTERVAL HISTORY:  Patient is neurologically stable. He started Briviact January 9, 2025 at 25 mg BID and continued Xcopri 400 mg per day. No meaningful reduction in his smaller focal unaware seizures. He has on average 2-5 seizures per month. He denies any side effects to medications.        He started trazodone last month and does feel more rested.    No other changes to hs health. He has sleep apnea that is not being treated due to his inability to tolerate CPAP.     Prior AEDs (all medication were incom,epletely effective and side effects are mentioned if present  Depakote - sedating, sleepy  Vimpat - not effective  Aptiom   Phenobarbital -sedating  Dilantin - sedating  Tegretol/Carbatrol  Keppra  Zonisamide - made him anxious  Lamictal - not effective    CURRENT MEDICATIONS:  Medications Ordered Prior to Encounter[1]     EXAM:   /72 (BP Location: Right arm, Patient Position: Sitting, BP Cuff Size: Adult)   Pulse 60   Temp 36.1 °C (96.9 °F) (Temporal)   Ht 1.727 m (5' 8\")   Wt 80.8 kg (178 lb 2.1 oz)   SpO2 96%    Wt Readings from Last 5 Encounters:   05/15/25 80.8 kg (178 lb 2.1 oz)   04/15/25 80.3 kg (177 lb)   01/02/25 86.3 kg (190 lb 4.1 oz)   10/09/24 77.1 kg (170 lb)   09/18/24 76.4 kg (168 lb 6.9 oz)      Physical Exam:  Physical Exam  Neurological:      Coordination: Romberg sign negative.   Psychiatric:         Speech: Speech normal.        Neurological Exam   Neurological Exam  Mental Status   Oriented only to person, place, time and situation. Speech is normal. Language is fluent with no aphasia.  A.    Motor   No fasciculations present. Normal muscle tone. No abnormal involuntary movements. No pronator drift.    Sensory  Light touch " is normal in upper and lower extremities.     Coordination  Right: Finger-to-nose normal. Rapid alternating movement normal.Left: Finger-to-nose normal. Rapid alternating movement normal.  -finger.    Gait  Casual gait is normal including stance, stride, and arm swing.Normal toe walking. Normal heel walking. Tandem gait abnormality: Romberg is absent.       ASSESSMENT, EDUCATION, AND COUNSELING:  This is a 64 y.o. male patient who presents to the neurology clinic. We had an extensive discussion about the patient's symptoms, signs, and work-up to date, if any. We discussed potential and/or definitive diagnoses, work-up, and potential treatments.     PLAN:  Medications administered in today's encounter if applicable:       If applicable, the work-up such as labs, imaging, procedures, and/or other testing, referrals, and/or recommended treatment strategies are listed below.  Orders Placed This Encounter    Menaquinone-7 (VITAMIN K2 PO)    brivaracetam (BRIVIACT) 50 MG Tab tablet    Cenobamate (XCOPRI) 200 MG Tab           Medication List            Accurate as of May 15, 2025  8:19 AM. If you have any questions, ask your nurse or doctor.                CHANGE how you take these medications        Instructions   brivaracetam 50 MG Tabs tablet  What changed:   medication strength  how much to take  Commonly known as: Briviact  Changed by: Dr. Fredy Franklin   Doctor's comments: Dose change. Dose increase. Request 60 tabs of 50 mg to cover 30 days plus 5 refills for a total of 180 days of coverage  Take 1 Tablet by mouth 2 times a day for 180 days.  Dose: 50 mg            CONTINUE taking these medications        Instructions   albuterol 108 (90 Base) MCG/ACT Aers inhalation aerosol   USE 2 INHALATIONS EVERY 6 HOURS AS NEEDED FOR SHORTNESS OF BREATH     folic acid 1 MG Tabs  Commonly known as: Folvite   Take 1 mg by mouth every day.  Dose: 1 mg     OMEGA-3 COMPLEX PO   Take  by mouth.     rosuvastatin 40 MG tablet  Commonly  known as: Crestor   Take 1 Tablet by mouth every day.  Dose: 40 mg     traZODone 50 MG Tabs  Commonly known as: Desyrel   Take 1 Tablet by mouth every evening.  Dose: 50 mg     Trelegy Ellipta 200-62.5-25 MCG/ACT inhaler  Generic drug: fluticasone-umeclidinium-vilanterol   Inhale 1 puff every day.  Dose: 1 Puff     VITAMIN B COMPLEX PO   Take  by mouth.     vitamin D 2000 UNIT Tabs   Take 2,000 Units by mouth every day.  Dose: 2,000 Units     VITAMIN K2 PO   Take  by mouth.     Xcopri 200 MG Tabs  Generic drug: Cenobamate   Doctor's comments: Request 60 tabs of 200 mg to cover 30 days plus 5 refills for a total of 180 days of coverage  Take 2 tabs (400 mg) by mouth at bedtime for 180 days. Indications: Partial Onset Seizure  Dose: 400 mg               Increase Briviact to 50 mg twice per day. New script sent. Continue Xcopri at 400 mg per day. Refills sent.    Wife will continue to keep seizure diary.    Follow-up in 4 months      BILLING DOCUMENTATION:     The number of minutes of face-to-face time spent in this encounter was I spent a total of 20 minutes on the day of the visit.  . Over 50% of the time of the visit today was spent on counseling and/or coordination of care wtih the patient and/or family, as outlined above in assessment in plan.    Fredy Franklin MD  Department of Neurology at [facility]  Diplomate of the American Board of Psychiatry and Neurology, General Neurology  Diplomate of American Board of Psychiatry and Neurology, a Member Board of the American Board of Medical Subspecialties, Epilepsy  Director of [facility] Level III Comprehensive Epilepsy Program  Professor of Clinical Neurology, [facility].   [address], [address]  [facility] [id number] [phone]   Fax: [phone]  E-mail: [email or web address]         [1]   Current Outpatient Medications on File Prior to Visit   Medication Sig Dispense Refill    Menaquinone-7 (VITAMIN  K2 PO) Take  by mouth.      rosuvastatin (CRESTOR) 40 MG tablet Take 1 Tablet by mouth every day. 90 Tablet 3    fluticasone-umeclidinium-vilanterol (TRELEGY ELLIPTA) 200-62.5-25 mcg/act inhaler Inhale 1 puff every day. 180 Each 3    traZODone (DESYREL) 50 MG Tab Take 1 Tablet by mouth every evening. 90 Tablet 0    B Complex Vitamins (VITAMIN B COMPLEX PO) Take  by mouth.      folic acid (FOLVITE) 1 MG Tab Take 1 mg by mouth every day.      albuterol 108 (90 Base) MCG/ACT Aero Soln inhalation aerosol USE 2 INHALATIONS EVERY 6 HOURS AS NEEDED FOR SHORTNESS OF BREATH 6.7 g 3    DHA-EPA-Vitamin E (OMEGA-3 COMPLEX PO) Take  by mouth.      Cholecalciferol (VITAMIN D) 2000 UNIT Tab Take 2,000 Units by mouth every day.       No current facility-administered medications on file prior to visit.

## 2025-05-22 ENCOUNTER — PHARMACY VISIT (OUTPATIENT)
Dept: PHARMACY | Facility: MEDICAL CENTER | Age: 65
End: 2025-05-22
Payer: COMMERCIAL

## 2025-05-23 PROCEDURE — RXMED WILLOW AMBULATORY MEDICATION CHARGE: Performed by: STUDENT IN AN ORGANIZED HEALTH CARE EDUCATION/TRAINING PROGRAM

## 2025-05-30 DIAGNOSIS — G47.9 DIFFICULTY SLEEPING: ICD-10-CM

## 2025-06-02 ENCOUNTER — PHARMACY VISIT (OUTPATIENT)
Dept: PHARMACY | Facility: MEDICAL CENTER | Age: 65
End: 2025-06-02
Payer: COMMERCIAL

## 2025-06-02 RX ORDER — TRAZODONE HYDROCHLORIDE 50 MG/1
50 TABLET ORAL NIGHTLY
Qty: 90 TABLET | Refills: 3 | Status: SHIPPED | OUTPATIENT
Start: 2025-06-02

## 2025-06-02 NOTE — TELEPHONE ENCOUNTER
Received request via: Pharmacy    Was the patient seen in the last year in this department? Yes    Does the patient have an active prescription (recently filled or refills available) for medication(s) requested? No    Pharmacy Name: [facility]    Does the patient have group home Plus and need 100-day supply? (This applies to ALL medications) Patient does not have SCP

## 2025-06-25 PROCEDURE — RXMED WILLOW AMBULATORY MEDICATION CHARGE: Performed by: STUDENT IN AN ORGANIZED HEALTH CARE EDUCATION/TRAINING PROGRAM

## 2025-06-29 PROCEDURE — RXMED WILLOW AMBULATORY MEDICATION CHARGE: Performed by: STUDENT IN AN ORGANIZED HEALTH CARE EDUCATION/TRAINING PROGRAM

## 2025-06-30 ENCOUNTER — PHARMACY VISIT (OUTPATIENT)
Dept: PHARMACY | Facility: MEDICAL CENTER | Age: 65
End: 2025-06-30
Payer: COMMERCIAL

## 2025-07-15 ENCOUNTER — OFFICE VISIT (OUTPATIENT)
Dept: MEDICAL GROUP | Facility: IMAGING CENTER | Age: 65
End: 2025-07-15
Payer: MEDICARE

## 2025-07-15 VITALS
WEIGHT: 174.25 LBS | OXYGEN SATURATION: 100 % | BODY MASS INDEX: 26.41 KG/M2 | HEART RATE: 52 BPM | DIASTOLIC BLOOD PRESSURE: 78 MMHG | HEIGHT: 68 IN | TEMPERATURE: 97.6 F | SYSTOLIC BLOOD PRESSURE: 114 MMHG

## 2025-07-15 DIAGNOSIS — J44.9 CHRONIC OBSTRUCTIVE PULMONARY DISEASE, UNSPECIFIED COPD TYPE (HCC): ICD-10-CM

## 2025-07-15 DIAGNOSIS — Z13.0 SCREENING FOR DEFICIENCY ANEMIA: ICD-10-CM

## 2025-07-15 DIAGNOSIS — Z13.1 DIABETES MELLITUS SCREENING: ICD-10-CM

## 2025-07-15 DIAGNOSIS — E78.5 DYSLIPIDEMIA: ICD-10-CM

## 2025-07-15 DIAGNOSIS — G47.9 DIFFICULTY SLEEPING: Primary | ICD-10-CM

## 2025-07-15 PROBLEM — E53.8 LOW SERUM VITAMIN B12: Status: RESOLVED | Noted: 2024-04-04 | Resolved: 2025-07-15

## 2025-07-15 PROBLEM — E53.8 LOW FOLATE: Status: RESOLVED | Noted: 2024-10-09 | Resolved: 2025-07-15

## 2025-07-15 PROCEDURE — 3078F DIAST BP <80 MM HG: CPT | Performed by: PHYSICIAN ASSISTANT

## 2025-07-15 PROCEDURE — 99214 OFFICE O/P EST MOD 30 MIN: CPT | Performed by: PHYSICIAN ASSISTANT

## 2025-07-15 PROCEDURE — 3074F SYST BP LT 130 MM HG: CPT | Performed by: PHYSICIAN ASSISTANT

## 2025-07-15 PROCEDURE — G2211 COMPLEX E/M VISIT ADD ON: HCPCS | Performed by: PHYSICIAN ASSISTANT

## 2025-07-15 RX ORDER — TRAZODONE HYDROCHLORIDE 50 MG/1
75 TABLET ORAL NIGHTLY
Qty: 135 TABLET | Refills: 3 | Status: SHIPPED | OUTPATIENT
Start: 2025-07-15

## 2025-07-15 ASSESSMENT — FIBROSIS 4 INDEX: FIB4 SCORE: 1.07

## 2025-07-15 NOTE — ASSESSMENT & PLAN NOTE
Patient notes improvement in his sleep quality and habits since starting trazodone 50 mg nightly.  States that it works more than 50% of the time, but is interested in going up the dose.  He does snore and has sleep apnea, but has refused CPAP.

## 2025-07-15 NOTE — Clinical Note
REFERRAL APPROVAL NOTICE         Sent on July 15, 2025                   Fredy Blockn  [address] [address]                   Dear Mr. Zavala,    After a careful review of the medical information and benefit coverage, [facility] has processed your referral. See below for additional details.    If applicable, you must be actively enrolled with your insurance for coverage of the authorized service. If you have any questions regarding your coverage, please contact your insurance directly.    REFERRAL INFORMATION   Referral #:  [id number]  Referred-To Department    Referred-By Provider:  Pulmonary and Sleep Medicine    Colleen Merlos P.A.-C.   Pulmonary Rehab [facility]      [address] [place] [address]  [phone] [address]  [place] [address]  [phone]    Referral Start Date:  07/15/2025  Referral End Date:   07/15/2026             SCHEDULING  If you do not already have an appointment, please call [phone] to make an appointment.     MORE INFORMATION  If you do not already have a [facility] [employer] account, sign up at: [email or web address]  You can access your medical information, make appointments, see lab results, billing information, and more.  If you have questions regarding this referral, please contact  the [facility] Referrals department at:             [phone]. Monday - Friday 8:00AM - 5:00PM.     Sincerely,    [facility]

## 2025-07-15 NOTE — PATIENT INSTRUCTIONS
It was a pleasure meeting with you today at [facility]!    Your medical history/records and medications were reviewed today.     UPDATE on MyChart Results: If you have blood work, and/or imaging studies, or any other test or procedure completed, you will have access to results as soon as they become available in MyChart. Recently, these results will be available for review at the same time that your provider is able to see results!    This will likely mean you will see a result before your provider has had a chance to review and discuss with you.  Some results or care notes may be hard to understand and may be serious in nature.    We look at every result and your provider will contact you to explain what they mean and discuss appropriate next steps. Please allow for at least 72 business hours for chart and result review.     We prefer that you wait for your care team to contact you with your results.  Often, your provider will discuss your results with you at your next appointment. We look forward to continuing to partner with you in your care.    Please review my practice information below:    If you have any prescription refill requests, please send them via Deeplink or discuss with your provider at the start of your office visits. Please allow 3-5 business days for lab and testing review and you will be contacted via Deeplink with those results, or if advised to make a follow up appointment regarding those results, then please do so.     Once resulted, your lab/test/imaging results will show up automatically in your MyChart. Please wait for my interpretation and recommendations prior to viewing your results to avoid any unnecessary confusion or misinterpretation. I will address all of the lab values that I interpret as abnormal and message you accordingly on your MyChart. I will always send you a message about your results even if they are normal. If you do not hear back from me within 5-7 business  days after completing your tests, then please send me a message on Youxiduo so I can obtain your results (especially if you went to an outside lab or imaging center - [facility], [facility], etc).     If you have any additional questions or concerns beyond my interpretation of your results, please make an appointment with me to discuss in further detail.    Please only use the Youx[employer] messaging system for questions regarding your most recent appointment or if advised to use otherwise (glucose or blood pressure reporting).     If you have any new problems or concerns, you must make an appointment to discuss. This includes any referral requests, lab requests (unless advised to notify me for pre-appt labs), medication side effects, or request for medication adjustments.     Please arrive 15 minutes prior to your appointment time to complete your check-in and intake with the medical assistant.      Thank you,    Colleen Merlos PA-C ([place])  Physician Assistant Certified  [facility]    -----------------------------------------------------------------    Attn: Patients of [facility]:    In an effort to continue to provide excellent and efficient care to our patients, it is vital that we continue to use our resources appropriately. With that, this is a reminder that [employer] is used for prescription refill requests, test results, virtual visits, and chart review only.     Any new questions, concerns/conditions, lab/imaging requests, medication adjustments, new prescriptions, or referral requests do require an appointment (virtually or in person), unless discussed otherwise at your most recent appointment.     Thank you for your understanding,    [facility]

## 2025-07-15 NOTE — PROGRESS NOTES
"Subjective:     CC:   Chief Complaint   Patient presents with    Other     Med recheck       HPI:   Fredy presents today to discuss:    COPD (chronic obstructive pulmonary disease) (HCC)  Chronic, consistently using Trelegy daily.  No COPD exacerbations.  States that he has been having some mild postnasal drip related to allergies, but no wheeze, shortness of breath, cough.  Due for PFT.    Difficulty sleeping  Patient notes improvement in his sleep quality and habits since starting trazodone 50 mg nightly.  States that it works more than 50% of the time, but is interested in going up the dose.  He does snore and has sleep apnea, but has refused CPAP.    Dyslipidemia  Chronic, on rosuvastatin 40 mg daily.  Will be due for labs prior to next appointment.    Past Medical History[1]  Family History   Problem Relation Age of Onset    Alcohol abuse Mother         cirrhosis    Alcohol abuse Father     Psychiatric Illness Father     Diabetes Father     Alcohol/Drug Brother     Cancer Neg Hx     Stroke Neg Hx     Heart Disease Neg Hx      Past Surgical History[2]  Social History[3]  Social History     Social History Narrative    Born and raised in [place]    , no kids    Retired, printer     Current Medications and Prescriptions Ordered in Epic[4]  Patient has no known allergies.    PMH/PSH/FH/Social history reviewed.  Vaccinations discussed.  Previous records and labs reviewed. Discussed age appropriate anticipatory guidance.    ROS: see hpi  Gen: no fevers/chills  Pulm: no sob, no cough  CV: no chest pain, no palpitations, no edema  GI: no nausea/vomiting, no diarrhea  Skin: no rash    Objective:   Exam:  /78 (BP Location: Right arm, Patient Position: Sitting, BP Cuff Size: Adult)   Pulse (!) 52   Temp 36.4 °C (97.6 °F) (Temporal)   Ht 1.727 m (5' 8\")   Wt 79 kg (174 lb 4 oz)   SpO2 100%   BMI 26.49 kg/m²    Body mass index is 26.49 kg/m².    Gen: Alert and oriented, No apparent distress.  HEENT: Head " atraumatic, normocephalic. Pupils equal and round.  Neck: Neck is supple without lymphadenopathy.   Lungs: Normal effort, CTA bilaterally, no wheezes, rhonchi, or rales  CV: Regular rate and rhythm. No murmurs, rubs, or gallops.  Ext: No clubbing, cyanosis, edema.    Assessment & Plan:     65 y.o. male with the following -     1. Difficulty sleeping (Primary)  Chronic, improved with trazodone but will increase dosage to 75 mg nightly for consistency.  If snoring persists or if he develops daytime fatigue, would recommend scheduling appointment with sleep medicine for reevaluation.  - traZODone (DESYREL) 50 MG Tab; Take 1.5 Tablets by mouth every evening.  Dispense: 135 Tablet; Refill: 3    2. Dyslipidemia  Chronic, controlled and stable. Continue current regimen -rosuvastatin 40 mg daily.  - Lipid Profile; Future    3. Chronic obstructive pulmonary disease, unspecified COPD type (HCC)  Chronic, controlled and stable. Continue current regimen -Trelegy 1 puff daily.  Check updated PFT.  - PULMONARY FUNCTION TESTS -Test requested: Complete Pulmonary Function Test; Future    4. Screening for deficiency anemia  - CBC WITH DIFFERENTIAL; Future    5. Diabetes mellitus screening  - Comp Metabolic Panel; Future      Return in about 3 months (around 10/15/2025) for Follow-up labs/tests.    Colleen Merlos PA-C ([place])  Physician Assistant Certified  [facility]    Billing : secondary to the complexity of this patient's illnesses and their interactions.  See assessment and plan above for the comprehensive evaluation and management of the patient's acute and chronic concerns.  As the patient's PCP, I am the continued focal point for all health care services for the patient's needs and ongoing subsequent medical care.  All problems listed were discussed during the office visit, medications were evaluated and complexities were discussed, as well as plan for the future.    Please note that this dictation was created  using voice recognition software. I have made every reasonable attempt to correct obvious errors, but I expect that there are errors of grammar and possibly content that I did not discover before finalizing the note.         [1]   Past Medical History:  Diagnosis Date    Asthma     Cancer (HCC)     prostate s/p prostatectomy    COPD (chronic obstructive pulmonary disease) (HCC)     Dyslipidemia     Partial epilepsy with impairment of consciousness (HCC)      ICD-10 transition   [2]   Past Surgical History:  Procedure Laterality Date    PROSTATECTOMY ROBOTIC  05/29/2012    Performed by JACOB RAMOS at [facility]    COLONOSCOPY  08/01/2010    grade 1 int. hemorrhoids    PROSTATECTOMY, RADICAL RETRO     [3]   Social History  Tobacco Use    Smoking status: Never    Smokeless tobacco: Never   Vaping Use    Vaping status: Never Used   Substance Use Topics    Alcohol use: Not Currently     Comment: stopped drinking 5 years ago    Drug use: Never     Comment: previous marijuana   [4]   Current Outpatient Medications Ordered in Epic   Medication Sig Dispense Refill    traZODone (DESYREL) 50 MG Tab Take 1.5 Tablets by mouth every evening. 135 Tablet 3    Menaquinone-7 (VITAMIN K2 PO) Take  by mouth.      brivaracetam (BRIVIACT) 50 MG Tab tablet Take 1 Tablet by mouth 2 times a day 60 Tablet 5    Cenobamate (XCOPRI) 200 MG Tab Take 2 tabs (400 mg) by mouth at bedtime. Indications: Partial Onset Seizure 60 Tablet 5    rosuvastatin (CRESTOR) 40 MG tablet Take 1 Tablet by mouth every day. 90 Tablet 3    fluticasone-umeclidinium-vilanterol (TRELEGY ELLIPTA) 200-62.5-25 mcg/act inhaler Inhale 1 puff every day. 180 Each 3    B Complex Vitamins (VITAMIN B COMPLEX PO) Take  by mouth.      folic acid (FOLVITE) 1 MG Tab Take 1 mg by mouth every day.      albuterol 108 (90 Base) MCG/ACT Aero Soln inhalation aerosol USE 2 INHALATIONS EVERY 6 HOURS AS NEEDED FOR SHORTNESS OF BREATH 6.7 g 3    DHA-EPA-Vitamin E (OMEGA-3  COMPLEX PO) Take  by mouth.      Cholecalciferol (VITAMIN D) 2000 UNIT Tab Take 2,000 Units by mouth every day.       No current [facility]-ordered facility-administered medications on file.

## 2025-07-15 NOTE — ASSESSMENT & PLAN NOTE
Chronic, consistently using Trelegy daily.  No COPD exacerbations.  States that he has been having some mild postnasal drip related to allergies, but no wheeze, shortness of breath, cough.  Due for PFT.

## 2025-07-16 ENCOUNTER — PHARMACY VISIT (OUTPATIENT)
Dept: PHARMACY | Facility: MEDICAL CENTER | Age: 65
End: 2025-07-16
Payer: COMMERCIAL

## 2025-07-16 PROCEDURE — RXMED WILLOW AMBULATORY MEDICATION CHARGE: Performed by: PHYSICIAN ASSISTANT

## 2025-07-23 PROCEDURE — RXMED WILLOW AMBULATORY MEDICATION CHARGE: Performed by: STUDENT IN AN ORGANIZED HEALTH CARE EDUCATION/TRAINING PROGRAM

## 2025-08-05 ENCOUNTER — PHARMACY VISIT (OUTPATIENT)
Dept: PHARMACY | Facility: MEDICAL CENTER | Age: 65
End: 2025-08-05
Payer: COMMERCIAL

## 2025-08-10 PROCEDURE — RXMED WILLOW AMBULATORY MEDICATION CHARGE: Performed by: PHYSICIAN ASSISTANT

## 2025-08-13 ENCOUNTER — SPECIALTY PHARMACY (OUTPATIENT)
Dept: NEUROLOGY | Facility: MEDICAL CENTER | Age: 65
End: 2025-08-13
Payer: MEDICARE

## 2025-08-13 PROCEDURE — RXMED WILLOW AMBULATORY MEDICATION CHARGE: Performed by: STUDENT IN AN ORGANIZED HEALTH CARE EDUCATION/TRAINING PROGRAM

## 2025-08-14 ENCOUNTER — SPECIALTY PHARMACY (OUTPATIENT)
Dept: NEUROLOGY | Facility: MEDICAL CENTER | Age: 65
End: 2025-08-14
Payer: MEDICARE

## 2025-08-14 ENCOUNTER — PHARMACY VISIT (OUTPATIENT)
Dept: PHARMACY | Facility: MEDICAL CENTER | Age: 65
End: 2025-08-14
Payer: COMMERCIAL

## 2025-08-21 ENCOUNTER — SPECIALTY PHARMACY (OUTPATIENT)
Dept: NEUROLOGY | Facility: MEDICAL CENTER | Age: 65
End: 2025-08-21
Payer: MEDICARE

## 2025-08-21 PROCEDURE — RXMED WILLOW AMBULATORY MEDICATION CHARGE: Performed by: STUDENT IN AN ORGANIZED HEALTH CARE EDUCATION/TRAINING PROGRAM

## 2025-08-25 ENCOUNTER — PHARMACY VISIT (OUTPATIENT)
Dept: PHARMACY | Facility: MEDICAL CENTER | Age: 65
End: 2025-08-25
Payer: COMMERCIAL